# Patient Record
Sex: FEMALE | Race: WHITE | NOT HISPANIC OR LATINO | Employment: FULL TIME | URBAN - METROPOLITAN AREA
[De-identification: names, ages, dates, MRNs, and addresses within clinical notes are randomized per-mention and may not be internally consistent; named-entity substitution may affect disease eponyms.]

---

## 2022-07-23 ENCOUNTER — HOSPITAL ENCOUNTER (EMERGENCY)
Facility: HOSPITAL | Age: 37
Discharge: HOME | End: 2022-07-23
Attending: INTERNAL MEDICINE | Admitting: EMERGENCY MEDICINE
Payer: COMMERCIAL

## 2022-07-23 ENCOUNTER — APPOINTMENT (EMERGENCY)
Dept: VASCULAR ULTRASOUND | Facility: HOSPITAL | Age: 37
End: 2022-07-23
Payer: COMMERCIAL

## 2022-07-23 VITALS
SYSTOLIC BLOOD PRESSURE: 116 MMHG | BODY MASS INDEX: 26.43 KG/M2 | RESPIRATION RATE: 16 BRPM | DIASTOLIC BLOOD PRESSURE: 76 MMHG | WEIGHT: 140 LBS | OXYGEN SATURATION: 100 % | HEART RATE: 85 BPM | HEIGHT: 61 IN | TEMPERATURE: 98.5 F

## 2022-07-23 DIAGNOSIS — M79.604 BILATERAL LEG PAIN: Primary | ICD-10-CM

## 2022-07-23 DIAGNOSIS — S93.402A LEFT ANKLE SPRAIN: ICD-10-CM

## 2022-07-23 DIAGNOSIS — M79.605 BILATERAL LEG PAIN: Primary | ICD-10-CM

## 2022-07-23 PROCEDURE — 93970 EXTREMITY STUDY: CPT | Performed by: SURGERY

## 2022-07-23 PROCEDURE — 99284 EMERGENCY DEPT VISIT MOD MDM: CPT

## 2022-07-23 PROCEDURE — 93970 EXTREMITY STUDY: CPT

## 2022-07-23 PROCEDURE — 99284 EMERGENCY DEPT VISIT MOD MDM: CPT | Performed by: INTERNAL MEDICINE

## 2022-07-23 RX ADMIN — DICLOFENAC SODIUM TOPICAL GEL, 1%, 2 G: 10 GEL TOPICAL at 19:15

## 2022-07-23 NOTE — DISCHARGE INSTRUCTIONS
Ultrasound of your leg showed no DVT  Please follow-up with your primary care provider regarding her pain  Tylenol and ibuprofen as needed for the pain

## 2022-07-23 NOTE — ED PROVIDER NOTES
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  None       History reviewed  No pertinent past medical history  History reviewed  No pertinent surgical history  History reviewed  No pertinent family history  I have reviewed and agree with the history as documented  E-Cigarette/Vaping    E-Cigarette Use Never User      E-Cigarette/Vaping Substances     Social History     Tobacco Use    Smoking status: Current Every Day Smoker     Packs/day: 0 50     Types: Cigarettes    Smokeless tobacco: Never Used   Vaping Use    Vaping Use: Never used   Substance Use Topics    Alcohol use: Yes     Comment: rare    Drug use: Not Currently       Review of Systems   Constitutional: Negative for fatigue and fever  HENT: Negative for congestion, sinus pressure, sinus pain and sore throat  Respiratory: Negative for cough and shortness of breath  Cardiovascular: Negative for chest pain and palpitations  Gastrointestinal: Negative for abdominal pain, diarrhea, nausea and vomiting  Genitourinary: Negative for difficulty urinating, dysuria, flank pain and frequency  Musculoskeletal: Positive for myalgias  Negative for back pain, neck pain and neck stiffness  Skin: Negative for color change, pallor and rash  Neurological: Negative for dizziness, seizures, speech difficulty, light-headedness and headaches     Hematological: Negative for adenopathy  Does not bruise/bleed easily  Psychiatric/Behavioral: Negative for agitation and behavioral problems  Physical Exam  Physical Exam  Vitals and nursing note reviewed  Constitutional:       General: She is not in acute distress  Appearance: She is well-developed  She is not ill-appearing, toxic-appearing or diaphoretic  HENT:      Head: Normocephalic and atraumatic  Right Ear: Ear canal normal       Left Ear: Ear canal normal       Nose: Nose normal  No congestion or rhinorrhea  Mouth/Throat:      Mouth: Mucous membranes are moist       Pharynx: No oropharyngeal exudate or posterior oropharyngeal erythema  Eyes:      Extraocular Movements: Extraocular movements intact  Pupils: Pupils are equal, round, and reactive to light  Cardiovascular:      Rate and Rhythm: Normal rate and regular rhythm  Heart sounds: Normal heart sounds  No murmur heard  No friction rub  No gallop  Pulmonary:      Effort: Pulmonary effort is normal  No respiratory distress  Breath sounds: Normal breath sounds  No wheezing, rhonchi or rales  Chest:      Chest wall: No tenderness  Abdominal:      General: Bowel sounds are normal  There is no distension  Palpations: Abdomen is soft  There is no mass  Tenderness: There is no abdominal tenderness  There is no right CVA tenderness, left CVA tenderness, guarding or rebound  Hernia: No hernia is present  Musculoskeletal:         General: Tenderness present  No swelling, deformity or signs of injury  Normal range of motion  Cervical back: Normal range of motion and neck supple  Right lower leg: No edema  Left lower leg: No edema  Comments: Examination of the right and left calves has pain and tenderness , no swelling , no erythema,redmess  Examination of both lower extremities , sensation intact, neurovascular intact  Skin:     General: Skin is warm and dry        Capillary Refill: Capillary refill takes less than 2 seconds  Coloration: Skin is not jaundiced or pale  Findings: No bruising, erythema, lesion or rash  Neurological:      Mental Status: She is alert and oriented to person, place, and time  Psychiatric:         Behavior: Behavior normal          Vital Signs  ED Triage Vitals [07/23/22 1810]   Temperature Pulse Respirations Blood Pressure SpO2   98 5 °F (36 9 °C) 85 16 116/76 100 %      Temp src Heart Rate Source Patient Position - Orthostatic VS BP Location FiO2 (%)   -- -- -- -- --      Pain Score       --           Vitals:    07/23/22 1810   BP: 116/76   Pulse: 85         Visual Acuity      ED Medications  Medications   Diclofenac Sodium (VOLTAREN) 1 % topical gel 2 g (2 g Topical Given 7/23/22 1915)       Diagnostic Studies  Results Reviewed     None                 VAS lower limb venous duplex study, complete bilateral   Final Result by Valentino Slates, MD (07/23 2128)                 Procedures  Procedures         ED Course                               SBIRT 22yo+    Flowsheet Row Most Recent Value   SBIRT (23 yo +)    In order to provide better care to our patients, we are screening all of our patients for alcohol and drug use  Would it be okay to ask you these screening questions? No Filed at: 07/23/2022 1858                    MDM    Disposition  Final diagnoses:   Bilateral leg pain   Left ankle sprain     Time reflects when diagnosis was documented in both MDM as applicable and the Disposition within this note     Time User Action Codes Description Comment    7/23/2022  7:00 PM Tami Diaz [Q65 755,  M66 795] Bilateral leg pain     7/23/2022  7:14 PM Tami Diaz [N14 326O] Left ankle sprain       ED Disposition     ED Disposition   Discharge    Condition   Stable    Date/Time   Sat Jul 23, 2022  7:00 PM    [address] [address] discharge to home/self care                 Follow-up Information     Follow up With Specialties Details Why Contact Info Additional [id number]  Emergency Department Emergency Medicine  If symptoms worsen [address] [address]  [address] [facility] Emergency Department, [address], [address]    [address] Specialists [facility] Orthopedic Surgery Schedule an appointment as soon as possible for a visit  For re-evaluation as soon as possible [address] [address] [phone] [address] [facility] [id number] [place] [address], [address] [place] [address] [phone]          There are no discharge medications for this patient  No discharge procedures on file      PDMP Review     None          ED Provider  Electronically Signed by           Marty Kilgore MD  07/24/22 0412

## 2022-07-23 NOTE — ED CARE HANDOFF
Emergency Department Sign Out Note        Sign out and transfer of care from my colleague  See Separate Emergency Department note  The patient, Clarissa Crane, was evaluated by the previous provider for bilateral leg pain  Workup Completed:  Sent from urgent care for rule out DVT  ED Course / Workup Pending (followup):  Pending duplex lower extremities  Duplex is negative  On my exam patient has very minimal tenderness over the left ATFL  Otherwise no pain on palpation of the lower extremities  History is not consistent with cramps  No reason for electrolyte test     Discussed possible x-ray of the left ankle  No significant suspicion for fracture  Patient declines x-ray will patient follow-up with orthopedic surgery  Will dischare home  Follow with ortho  Nurse placed patient in [facility]                                      Procedures  MDM        Disposition  Final diagnoses:   Bilateral leg pain     Time reflects when diagnosis was documented in both MDM as applicable and the Disposition within this note     Time User Action Codes Description Comment    7/23/2022  7:00 PM Serena Gregory Add [C90 660,  M16 60] Bilateral leg pain       ED Disposition     ED Disposition   Discharge    Condition   Stable    Date/Time   Sat Jul 23, 2022  7:00 PM    [address] [facility] [address] discharge to home/self care                 Follow-up Information     Follow up With Specialties Details Why Contact Info Additional [id number]  Emergency Department Emergency Medicine  If symptoms worsen [address] [address]  711 [facility] Emergency Department, [address], [address] [facility] Specialists [facility] Orthopedic Surgery Schedule an appointment as soon as possible for a visit  For re-evaluation as soon as possible [address] [address]  [phone] [address] [phone]        Patient's Medications    No medications on file     No discharge procedures on file         ED Provider  Electronically Signed by     Payal Amador DO  07/23/22 [address] [place] [address],   07/23/22 8962

## 2022-07-23 NOTE — ED NOTES
Discharge instructions reviewed with pt  Pt verbalized understanding  And has no further questions at this time         Brandon Askew RN  07/23/22 1935
Lázaro texted vascular on call, aware of order     Rea Melendez, NASRIN  07/23/22 8427
Provider at bedside       Chanell Agee RN  07/23/22 0574
Report to NASRIN Cardona RN  07/23/22 8579
IV intact

## 2023-03-24 ENCOUNTER — OFFICE VISIT (OUTPATIENT)
Dept: FAMILY MEDICINE CLINIC | Facility: CLINIC | Age: 38
End: 2023-03-24

## 2023-03-24 VITALS
HEART RATE: 74 BPM | RESPIRATION RATE: 16 BRPM | WEIGHT: 147 LBS | DIASTOLIC BLOOD PRESSURE: 76 MMHG | BODY MASS INDEX: 27.75 KG/M2 | OXYGEN SATURATION: 98 % | SYSTOLIC BLOOD PRESSURE: 110 MMHG | TEMPERATURE: 98.9 F | HEIGHT: 61 IN

## 2023-03-24 DIAGNOSIS — K21.9 GASTROESOPHAGEAL REFLUX DISEASE WITHOUT ESOPHAGITIS: ICD-10-CM

## 2023-03-24 DIAGNOSIS — J45.20 MILD INTERMITTENT ASTHMA WITHOUT COMPLICATION: ICD-10-CM

## 2023-03-24 DIAGNOSIS — Z23 NEED FOR VACCINATION: ICD-10-CM

## 2023-03-24 DIAGNOSIS — R20.0 NUMBNESS AND TINGLING IN BOTH HANDS: ICD-10-CM

## 2023-03-24 DIAGNOSIS — R20.2 NUMBNESS AND TINGLING IN BOTH HANDS: ICD-10-CM

## 2023-03-24 DIAGNOSIS — F17.219 CIGARETTE NICOTINE DEPENDENCE WITH NICOTINE-INDUCED DISORDER: ICD-10-CM

## 2023-03-24 DIAGNOSIS — Z23 IMMUNIZATION DUE: ICD-10-CM

## 2023-03-24 DIAGNOSIS — Z11.59 NEED FOR HEPATITIS C SCREENING TEST: ICD-10-CM

## 2023-03-24 DIAGNOSIS — Z00.00 ANNUAL PHYSICAL EXAM: Primary | ICD-10-CM

## 2023-03-24 PROBLEM — F17.210 CIGARETTE NICOTINE DEPENDENCE: Status: ACTIVE | Noted: 2023-03-24

## 2023-03-24 PROBLEM — N94.6 DYSMENORRHEA: Status: ACTIVE | Noted: 2023-03-24

## 2023-03-24 RX ORDER — ALBUTEROL SULFATE 90 UG/1
2 AEROSOL, METERED RESPIRATORY (INHALATION) EVERY 4 HOURS
COMMUNITY

## 2023-03-24 RX ORDER — FAMOTIDINE 40 MG/1
40 TABLET, FILM COATED ORAL DAILY
Qty: 30 TABLET | Refills: 1 | Status: SHIPPED | OUTPATIENT
Start: 2023-03-24

## 2023-03-24 NOTE — PROGRESS NOTES
Name: Gail Fontenot      : 1985      MRN: [id number]  Encounter Provider: Cary Rene MD  Encounter Date: 3/24/2023   Encounter department: [address] [place] [address] MEDICINE    Assessment & Plan     1  Annual physical exam  Assessment & Plan:  Check routine labs      Orders:  -     CBC and differential; Future  -     Comprehensive metabolic panel; Future; Expected date: 2023  -     Lipid panel; Future; Expected date: 2023    2  Need for vaccination  -     TDAP VACCINE GREATER THAN OR EQUAL TO 6YO IM    3  Need for hepatitis C screening test  -     Hepatitis C Antibody ([facility]); Future    4  Immunization due    5  Numbness and tingling in both hands  Assessment & Plan:  History consistent with CTS for years  will check EMG    Orders:  -     EMG 2 Limb Upper Extremity; Future    6  Gastroesophageal reflux disease without esophagitis  Assessment & Plan:  Pt uses prilosec otc has had more discomfort recently upper abd will start  pepcid UGI US abd    Orders:  -     FL UPPER GI UGI; Future; Expected date: 2023  -     US abdomen complete; Future; Expected date: 2023  -     famotidine (PEPCID) 40 MG tablet; Take 1 tablet (40 mg total) by mouth daily    7  Mild intermittent asthma without complication  Assessment & Plan:  Pt with no formal diagnosis  Does  Smoke will check  PFTs and chest xray uses ventolin prn    Orders:  -     Complete PFT with post bronchodilator; Future  -     XR chest pa & lateral; Future; Expected date: 2023    8   Cigarette nicotine dependence with nicotine-induced disorder  Assessment & Plan:  Pt does not wish to stop at this time warnings on lung heart and vascular disease              Subjective      HPI new pt here for physical pt has had numbness in both hands fpr years comes and goes now feeliing it more frequently no repetitive  movements no numbness now had mild symptoms this am worse at night feels she has to shakeout hand  Has had stomach issues for a long time a couple of months on off; does get "acid reflux" uses prilosec which helps  Pain is a sensation of fulness or tightness  Does get dysmenorrhea"very badly" will ve seeing gyn 5/23  Pt thinks she may have asthma gets sob wheezing with colds or being over hearted or very cold  requiring inhaler  Pt smokes daily  1/2ppd for 15 yrs   Review of Systems   Constitutional: Negative for appetite change, chills, fatigue and fever  Respiratory: Negative for cough, chest tightness and shortness of breath  Cardiovascular: Negative for chest pain, palpitations and leg swelling  Gastrointestinal: Negative for abdominal pain, constipation, diarrhea, nausea and vomiting  Genitourinary: Positive for menstrual problem (severe menstrual cramps)  Negative for difficulty urinating and frequency  Musculoskeletal: Negative for arthralgias, back pain, gait problem and neck pain  Skin: Negative for rash  Neurological: Negative for dizziness, weakness, light-headedness, numbness (both hands ) and headaches  Hematological: Does not bruise/bleed easily  Psychiatric/Behavioral: Negative for dysphoric mood and sleep disturbance  The patient is not nervous/anxious  Current Outpatient Medications on File Prior to Visit   Medication Sig   • albuterol (PROVENTIL HFA,VENTOLIN HFA) 90 mcg/act inhaler Inhale 2 puffs every 4 (four) hours       Objective     /76 (BP Location: Left arm, Patient Position: Sitting, Cuff Size: Standard)   Pulse 74   Temp 98 9 °F (37 2 °C) (Tympanic)   Resp 16   Ht 5' 1" (1 549 m)   Wt 66 7 kg (147 lb)   SpO2 98%   BMI 27 78 kg/m²     Physical Exam  Vitals reviewed  Constitutional:       General: She is not in acute distress  Appearance: Normal appearance  She is well-developed  She is not ill-appearing  HENT:      Head: Normocephalic        Right Ear: Tympanic membrane, ear canal and external ear normal       Left Ear: Tympanic membrane, ear canal and external ear normal       Nose: Nose normal       Mouth/Throat:      Mouth: Mucous membranes are moist       Pharynx: No oropharyngeal exudate  Eyes:      General: Lids are normal  No scleral icterus  Extraocular Movements: Extraocular movements intact  Conjunctiva/sclera: Conjunctivae normal       Pupils: Pupils are equal, round, and reactive to light  Neck:      Thyroid: No thyromegaly  Vascular: No carotid bruit  Cardiovascular:      Rate and Rhythm: Normal rate and regular rhythm  Pulses: Normal pulses  Heart sounds: Normal heart sounds  No murmur heard  No friction rub  Pulmonary:      Effort: Pulmonary effort is normal       Breath sounds: Normal breath sounds  No wheezing, rhonchi or rales  Abdominal:      General: Bowel sounds are normal  There is no distension  Palpations: Abdomen is soft  There is no mass  Tenderness: There is no abdominal tenderness  There is no guarding  Hernia: No hernia is present  Musculoskeletal:         General: Normal range of motion  Cervical back: Normal range of motion and neck supple  Lymphadenopathy:      Cervical: No cervical adenopathy  Skin:     General: Skin is warm and dry  Findings: No rash  Comments: No abnormal appearing moles   Neurological:      General: No focal deficit present  Mental Status: She is alert and oriented to person, place, and time  Mental status is at baseline  Cranial Nerves: No cranial nerve deficit  Sensory: No sensory deficit  Motor: No weakness, tremor or abnormal muscle tone  Coordination: Coordination normal       Gait: Gait normal       Deep Tendon Reflexes: Reflexes normal       Comments: Negative phalens; positive Tinels bilaterally    Psychiatric:         Mood and Affect: Mood normal          Speech: Speech normal          Behavior: Behavior normal      BMI Counseling: Body mass index is 27 78 kg/m²   The BMI is above normal  Nutrition recommendations include 3-5 servings of fruits/vegetables daily, reducing fast food intake, consuming healthier snacks, decreasing soda and/or juice intake, moderation in carbohydrate intake and increasing intake of lean protein  Exercise recommendations include exercising 3-5 times per week and strength training exercises    Anjelica Hess MD

## 2023-04-01 ENCOUNTER — APPOINTMENT (OUTPATIENT)
Dept: LAB | Facility: CLINIC | Age: 38
End: 2023-04-01

## 2023-04-01 DIAGNOSIS — Z00.00 ANNUAL PHYSICAL EXAM: ICD-10-CM

## 2023-04-01 DIAGNOSIS — Z11.59 NEED FOR HEPATITIS C SCREENING TEST: ICD-10-CM

## 2023-04-01 LAB
ALBUMIN SERPL BCP-MCNC: 4 G/DL (ref 3.5–5)
ALP SERPL-CCNC: 66 U/L (ref 34–104)
ALT SERPL W P-5'-P-CCNC: 10 U/L (ref 7–52)
ANION GAP SERPL CALCULATED.3IONS-SCNC: 4 MMOL/L (ref 4–13)
AST SERPL W P-5'-P-CCNC: 11 U/L (ref 13–39)
BASOPHILS # BLD AUTO: 0.1 THOUSANDS/ÂΜL (ref 0–0.1)
BASOPHILS NFR BLD AUTO: 1 % (ref 0–1)
BILIRUB SERPL-MCNC: 0.67 MG/DL (ref 0.2–1)
BUN SERPL-MCNC: 12 MG/DL (ref 5–25)
CALCIUM SERPL-MCNC: 8.7 MG/DL (ref 8.4–10.2)
CHLORIDE SERPL-SCNC: 106 MMOL/L (ref 96–108)
CHOLEST SERPL-MCNC: 163 MG/DL
CO2 SERPL-SCNC: 29 MMOL/L (ref 21–32)
CREAT SERPL-MCNC: 0.77 MG/DL (ref 0.6–1.3)
EOSINOPHIL # BLD AUTO: 0.09 THOUSAND/ÂΜL (ref 0–0.61)
EOSINOPHIL NFR BLD AUTO: 1 % (ref 0–6)
ERYTHROCYTE [DISTWIDTH] IN BLOOD BY AUTOMATED COUNT: 13.6 % (ref 11.6–15.1)
GFR SERPL CREATININE-BSD FRML MDRD: 100 ML/MIN/1.73SQ M
GLUCOSE P FAST SERPL-MCNC: 97 MG/DL (ref 65–99)
HCT VFR BLD AUTO: 42.3 % (ref 34.8–46.1)
HCV AB SER QL: NORMAL
HDLC SERPL-MCNC: 42 MG/DL
HGB BLD-MCNC: 13.7 G/DL (ref 11.5–15.4)
IMM GRANULOCYTES # BLD AUTO: 0.04 THOUSAND/UL (ref 0–0.2)
IMM GRANULOCYTES NFR BLD AUTO: 1 % (ref 0–2)
LDLC SERPL CALC-MCNC: 107 MG/DL (ref 0–100)
LYMPHOCYTES # BLD AUTO: 1.96 THOUSANDS/ÂΜL (ref 0.6–4.47)
LYMPHOCYTES NFR BLD AUTO: 23 % (ref 14–44)
MCH RBC QN AUTO: 29.1 PG (ref 26.8–34.3)
MCHC RBC AUTO-ENTMCNC: 32.4 G/DL (ref 31.4–37.4)
MCV RBC AUTO: 90 FL (ref 82–98)
MONOCYTES # BLD AUTO: 0.44 THOUSAND/ÂΜL (ref 0.17–1.22)
MONOCYTES NFR BLD AUTO: 5 % (ref 4–12)
NEUTROPHILS # BLD AUTO: 5.79 THOUSANDS/ÂΜL (ref 1.85–7.62)
NEUTS SEG NFR BLD AUTO: 69 % (ref 43–75)
NONHDLC SERPL-MCNC: 121 MG/DL
NRBC BLD AUTO-RTO: 0 /100 WBCS
PLATELET # BLD AUTO: 296 THOUSANDS/UL (ref 149–390)
PMV BLD AUTO: 11.1 FL (ref 8.9–12.7)
POTASSIUM SERPL-SCNC: 4 MMOL/L (ref 3.5–5.3)
PROT SERPL-MCNC: 6.5 G/DL (ref 6.4–8.4)
RBC # BLD AUTO: 4.7 MILLION/UL (ref 3.81–5.12)
SODIUM SERPL-SCNC: 139 MMOL/L (ref 135–147)
TRIGL SERPL-MCNC: 69 MG/DL
WBC # BLD AUTO: 8.42 THOUSAND/UL (ref 4.31–10.16)

## 2023-04-06 ENCOUNTER — HOSPITAL ENCOUNTER (OUTPATIENT)
Dept: PULMONOLOGY | Facility: HOSPITAL | Age: 38
End: 2023-04-06

## 2023-04-06 DIAGNOSIS — J45.20 MILD INTERMITTENT ASTHMA WITHOUT COMPLICATION: ICD-10-CM

## 2023-04-06 RX ORDER — ALBUTEROL SULFATE 2.5 MG/3ML
2.5 SOLUTION RESPIRATORY (INHALATION) ONCE
Status: COMPLETED | OUTPATIENT
Start: 2023-04-06 | End: 2023-04-06

## 2023-04-06 RX ADMIN — ALBUTEROL SULFATE 2.5 MG: 2.5 SOLUTION RESPIRATORY (INHALATION) at 16:59

## 2023-06-14 ENCOUNTER — ANNUAL EXAM (OUTPATIENT)
Dept: OBGYN CLINIC | Facility: CLINIC | Age: 38
End: 2023-06-14
Payer: COMMERCIAL

## 2023-06-14 VITALS
SYSTOLIC BLOOD PRESSURE: 112 MMHG | DIASTOLIC BLOOD PRESSURE: 70 MMHG | BODY MASS INDEX: 27.41 KG/M2 | BODY MASS INDEX: 27.41 KG/M2 | WEIGHT: 145.2 LBS | HEIGHT: 61 IN | DIASTOLIC BLOOD PRESSURE: 70 MMHG | WEIGHT: 145.2 LBS | SYSTOLIC BLOOD PRESSURE: 112 MMHG | HEIGHT: 61 IN

## 2023-06-14 DIAGNOSIS — N92.0 MENORRHAGIA WITH REGULAR CYCLE: ICD-10-CM

## 2023-06-14 DIAGNOSIS — Z12.4 ENCOUNTER FOR PAPANICOLAOU SMEAR FOR CERVICAL CANCER SCREENING: ICD-10-CM

## 2023-06-14 DIAGNOSIS — Z01.419 WOMEN'S ANNUAL ROUTINE GYNECOLOGICAL EXAMINATION: Primary | ICD-10-CM

## 2023-06-14 PROCEDURE — G0476 HPV COMBO ASSAY CA SCREEN: HCPCS | Performed by: OBSTETRICS & GYNECOLOGY

## 2023-06-14 PROCEDURE — 99385 PREV VISIT NEW AGE 18-39: CPT | Performed by: OBSTETRICS & GYNECOLOGY

## 2023-06-14 PROCEDURE — G0145 SCR C/V CYTO,THINLAYER,RESCR: HCPCS | Performed by: OBSTETRICS & GYNECOLOGY

## 2023-06-14 RX ORDER — ACETAMINOPHEN AND CODEINE PHOSPHATE 120; 12 MG/5ML; MG/5ML
1 SOLUTION ORAL DAILY
Qty: 84 TABLET | Refills: 1 | Status: SHIPPED | OUTPATIENT
Start: 2023-06-14

## 2023-06-14 NOTE — PROGRESS NOTES
"Subjective      Tomi Gimenez is a 30 y o  female who presents for annual well woman exam  Periods are regular every 28-30 days, lasting 7 days  No intermenstrual bleeding, spotting, or discharge  Heavy and painful   4 days very heavy change pad q 1-2h  At night very heavy       Current contraception: condoms  History of abnormal Pap smear: no  Family history of uterine or ovarian cancer: no  Family history of breast cancer: no    Menstrual History:  OB History        0    Para        Term                AB   0    Living   0       SAB   0    IAB   0    Ectopic   0    Multiple        Live Births                      Patient's last menstrual period was 2023  The following portions of the patient's history were reviewed and updated as appropriate: allergies, current medications, past family history, past medical history, past social history, past surgical history and problem list     Review of Systems  Review of Systems   Constitutional: Negative for activity change, appetite change, chills, fatigue and fever  Respiratory: Negative for apnea, cough, chest tightness and shortness of breath  Cardiovascular: Negative for chest pain, palpitations and leg swelling  Gastrointestinal: Negative for abdominal pain, constipation, diarrhea, nausea and vomiting  Genitourinary: Negative for difficulty urinating, dysuria, flank pain, frequency, hematuria and urgency  Neurological: Negative for dizziness, seizures, syncope, light-headedness, numbness and headaches  Psychiatric/Behavioral: Negative for agitation and confusion            Objective      /70 (BP Location: Left arm, Patient Position: Sitting, Cuff Size: Large)   Ht 5' 1\" (1 549 m)   Wt 65 9 kg (145 lb 3 2 oz)   LMP 2023   Breastfeeding No   BMI 27 44 kg/m²     Physical Exam  OBGyn Exam     General:   alert and oriented, in no acute distress, alert, appears stated age and cooperative   Heart: regular rate and " rhythm, S1, S2 normal, no murmur, click, rub or gallop   Lungs: clear to auscultation bilaterally   Abdomen: soft, non-tender, without masses or organomegaly   Vulva: normal   Vagina: normal mucosa, normal discharge   Cervix: no cervical motion tenderness and no lesions   Uterus: normal size   Adnexa:  Breast Exam:  normal adnexa  breasts appear normal, no suspicious masses, no skin or nipple changes or axillary nodes  Assessment      @well woman@   53-year-old female  Annual exam  Menorrhagia  Dysmenorrhea/PMS  Smoker  Plan   Pap/HPV  Diet/exercise  Calcium/vitamin D  Pelvic ultrasound    Management option for menorrhagia/dysmenorrhea/PMS symptoms reviewed and discussed with patient patient is not candidate for combined hormonal contraception secondary to her age and smoking option given for progesterone only pills, progesterone method such as Depo-Provera/, Mirena IUD to help with menorrhagia, patient desired to try progesterone only pills risk-benefit side effect reviewed and discussed with patient to monitor her symptoms for the next 3 months have her ultrasound and return to office in 3 months for follow-up plan explained and discussed with patient all patient questions answered and patient     All questions answered  There are no Patient Instructions on file for this visit

## 2023-06-19 LAB
HPV HR 12 DNA CVX QL NAA+PROBE: NEGATIVE
HPV16 DNA CVX QL NAA+PROBE: NEGATIVE
HPV18 DNA CVX QL NAA+PROBE: NEGATIVE

## 2023-06-23 ENCOUNTER — HOSPITAL ENCOUNTER (OUTPATIENT)
Dept: ULTRASOUND IMAGING | Facility: HOSPITAL | Age: 38
Discharge: HOME | End: 2023-06-23
Attending: OBSTETRICS & GYNECOLOGY
Payer: COMMERCIAL

## 2023-06-23 DIAGNOSIS — N92.0 MENORRHAGIA WITH REGULAR CYCLE: ICD-10-CM

## 2023-06-23 PROCEDURE — 76830 TRANSVAGINAL US NON-OB: CPT

## 2023-06-23 PROCEDURE — 76856 US EXAM PELVIC COMPLETE: CPT

## 2023-06-26 LAB
LAB AP GYN PRIMARY INTERPRETATION: NORMAL
Lab: NORMAL
PATH INTERP SPEC-IMP: NORMAL

## 2023-07-03 DIAGNOSIS — A59.9 TRICHIMONIASIS: Primary | ICD-10-CM

## 2023-07-03 RX ORDER — METRONIDAZOLE 500 MG/1
500 TABLET ORAL EVERY 12 HOURS SCHEDULED
Qty: 14 TABLET | Refills: 0 | Status: SHIPPED | OUTPATIENT
Start: 2023-07-03 | End: 2023-07-10

## 2023-07-11 ENCOUNTER — TELEPHONE (OUTPATIENT)
Dept: OBGYN CLINIC | Facility: CLINIC | Age: 38
End: 2023-07-11

## 2023-09-05 ENCOUNTER — OFFICE VISIT (OUTPATIENT)
Dept: OBGYN CLINIC | Facility: CLINIC | Age: 38
End: 2023-09-05
Payer: COMMERCIAL

## 2023-09-05 VITALS
DIASTOLIC BLOOD PRESSURE: 64 MMHG | WEIGHT: 149 LBS | HEIGHT: 61 IN | BODY MASS INDEX: 28.13 KG/M2 | SYSTOLIC BLOOD PRESSURE: 110 MMHG

## 2023-09-05 DIAGNOSIS — N92.1 MENORRHAGIA WITH IRREGULAR CYCLE: Primary | ICD-10-CM

## 2023-09-05 PROCEDURE — 99213 OFFICE O/P EST LOW 20 MIN: CPT | Performed by: OBSTETRICS & GYNECOLOGY

## 2023-09-05 RX ORDER — MEDROXYPROGESTERONE ACETATE 150 MG/ML
150 INJECTION, SUSPENSION INTRAMUSCULAR
Qty: 1 ML | Refills: 2 | Status: SHIPPED | OUTPATIENT
Start: 2023-09-05

## 2023-09-05 NOTE — PROGRESS NOTES
Assessment/Plan:     Diagnoses and all orders for this visit:    Menorrhagia with irregular cycle  -     medroxyPROGESTERone (DEPO-PROVERA) 150 mg/mL injection; Inject 1 mL (150 mg total) into a muscle every 3 (three) months          72-year-old female  Annual exam  Menorrhagia  Dysmenorrhea/PMS  Smoker  Plan   Patient try progesterone only pills with improvement of her PMS and mild improvement for her dyspareunia/menorrhagia desire to try Depo-Provera risk-benefit side effect reviewed and discussed with patient importance of taking her daily requirement of calcium and vitamin D discussed all patient question answered and patient was satisfied   to return to office for Depo-Provera      Subjective:      Patient ID: Farrukh Ozuna is a 41 y.o. female.     HPI  72-year-old female presents to the office today follow-up on OCP  Patient was taking OCP secondary to PMS/dysmenorrhea/menorrhagia  Patient said for the first 2 months her PMS dysmenorrhea and the rash is significantly improved  For the third month patient was having improvement in PMS but still have dysmenorrhea and menorrhagia  Different management option for her symptoms reviewed and discussed with patient option given to continue current management versus try Depo-Provera patient is not a candidate for combined OCPs secondary to her smoking and age  Patient is willing to try Depo-Provera  Risk benefits side effect reviewed and discussed with patient importance of taking her daily requirement of calcium and vitamin D discussed with patient to return to office in 1 to 2 weeks for Depo-Provera injection  Bleeding pattern with Depo-Provera discussed with patient as well as weight gain as a side effect reviewed and discussed with patient  All patient questions answered in details and patient was satisfied  The following portions of the patient's history were reviewed and updated as appropriate: allergies, current medications, past family history, past medical history, past social history, past surgical history and problem list.    Review of Systems      Objective:      /64 (BP Location: Left arm, Patient Position: Sitting, Cuff Size: Adult)   Ht 5' 1" (1.549 m)   Wt 67.6 kg (149 lb)   LMP 08/31/2023   BMI 28.15 kg/m²          Physical Exam  Constitutional:       Appearance: Normal appearance. Neurological:      General: No focal deficit present. Mental Status: She is alert and oriented to person, place, and time.    Psychiatric:         Mood and Affect: Mood normal.         Behavior: Behavior normal.

## 2023-09-25 ENCOUNTER — OFFICE VISIT (OUTPATIENT)
Dept: FAMILY MEDICINE CLINIC | Facility: CLINIC | Age: 38
End: 2023-09-25
Payer: COMMERCIAL

## 2023-09-25 VITALS
TEMPERATURE: 99.2 F | WEIGHT: 150 LBS | DIASTOLIC BLOOD PRESSURE: 72 MMHG | RESPIRATION RATE: 16 BRPM | OXYGEN SATURATION: 98 % | HEIGHT: 61 IN | HEART RATE: 97 BPM | BODY MASS INDEX: 28.32 KG/M2 | SYSTOLIC BLOOD PRESSURE: 110 MMHG

## 2023-09-25 DIAGNOSIS — J06.9 ACUTE URI: Primary | ICD-10-CM

## 2023-09-25 PROCEDURE — 99213 OFFICE O/P EST LOW 20 MIN: CPT | Performed by: FAMILY MEDICINE

## 2023-09-25 RX ORDER — AMOXICILLIN 875 MG/1
875 TABLET, COATED ORAL 2 TIMES DAILY
Qty: 20 TABLET | Refills: 0 | Status: SHIPPED | OUTPATIENT
Start: 2023-09-25 | End: 2023-10-05

## 2023-09-25 NOTE — PROGRESS NOTES
Name: Kenny Ball      : 1985      MRN: [id number]  Encounter Provider: Nida Atkinson MD  Encounter Date: 2023   Encounter department: [facility] [address] MEDICINE    Assessment & Plan     1. Acute URI  Assessment & Plan:  Probable sinusitis will trewat amoxicillin and otc decongestant     Orders:  -     amoxicillin (AMOXIL) 875 mg tablet; Take 1 tablet (875 mg total) by mouth 2 (two) times a day for 10 days           Subjective      HPI pt with 4 weeks of illness head congestion  burning in nose body aches  Mild sore throat  Occasional cough  No wheezing occasional tightness in breathing  Multiple negative tests at home for covid  Review of Systems   Constitutional: Negative for chills and fever. HENT: Positive for sinus pressure and sore throat. Negative for congestion, ear pain, mouth sores, rhinorrhea, sinus pain and trouble swallowing. Eyes: Negative for discharge. Respiratory: Negative for cough, chest tightness and shortness of breath. Cardiovascular: Negative for chest pain. Musculoskeletal: Negative for arthralgias and myalgias. Neurological: Negative for headaches. Current Outpatient Medications on File Prior to Visit   Medication Sig   • albuterol (PROVENTIL HFA,VENTOLIN HFA) 90 mcg/act inhaler Inhale 2 puffs every 4 (four) hours   • medroxyPROGESTERone (DEPO-PROVERA) 150 mg/mL injection Inject 1 mL (150 mg total) into a muscle every 3 (three) months   • famotidine (PEPCID) 40 MG tablet TAKE 1 TABLET BY MOUTH EVERY DAY       Objective     /72 (BP Location: Left arm, Patient Position: Sitting, Cuff Size: Standard)   Pulse 97   Temp 99.2 °F (37.3 °C) (Tympanic)   Resp 16   Ht 5' 1" (1.549 m)   Wt 68 kg (150 lb)   LMP 2023   SpO2 98%   BMI 28.34 kg/m²     Physical Exam  Constitutional:       General: She is not in acute distress. Appearance: Normal appearance. She is well-developed. She is not ill-appearing.    HENT:      Right Ear: Tympanic membrane and ear canal normal.      Left Ear: Tympanic membrane and ear canal normal.      Nose: Nose normal.      Right Sinus: No maxillary sinus tenderness or frontal sinus tenderness. Left Sinus: No maxillary sinus tenderness or frontal sinus tenderness. Mouth/Throat:      Mouth: Mucous membranes are moist.      Pharynx: No oropharyngeal exudate or posterior oropharyngeal erythema. Tonsils: No tonsillar exudate. Eyes:      General:         Right eye: No discharge. Left eye: No discharge. Conjunctiva/sclera: Conjunctivae normal.      Pupils: Pupils are equal, round, and reactive to light. Cardiovascular:      Rate and Rhythm: Normal rate. Heart sounds: Normal heart sounds. Pulmonary:      Effort: Pulmonary effort is normal.      Breath sounds: Normal breath sounds. Musculoskeletal:      Cervical back: Normal range of motion. Lymphadenopathy:      Cervical: No cervical adenopathy. Neurological:      General: No focal deficit present. Mental Status: She is alert. Mental status is at baseline.    Psychiatric:         Mood and Affect: Mood normal.       Zay Delgadillo MD

## 2023-09-29 ENCOUNTER — CLINICAL SUPPORT (OUTPATIENT)
Dept: OBGYN CLINIC | Facility: CLINIC | Age: 38
End: 2023-09-29
Payer: COMMERCIAL

## 2023-09-29 VITALS
WEIGHT: 150 LBS | BODY MASS INDEX: 28.32 KG/M2 | SYSTOLIC BLOOD PRESSURE: 112 MMHG | DIASTOLIC BLOOD PRESSURE: 74 MMHG | HEIGHT: 61 IN

## 2023-09-29 DIAGNOSIS — N92.6 IRREGULAR MENSES: ICD-10-CM

## 2023-09-29 DIAGNOSIS — Z30.013 ENCOUNTER FOR INITIAL PRESCRIPTION OF INJECTABLE CONTRACEPTIVE: Primary | ICD-10-CM

## 2023-09-29 PROCEDURE — 96372 THER/PROPH/DIAG INJ SC/IM: CPT

## 2023-09-29 RX ORDER — MEDROXYPROGESTERONE ACETATE 150 MG/ML
150 INJECTION, SUSPENSION INTRAMUSCULAR ONCE
Status: COMPLETED | OUTPATIENT
Start: 2023-09-29 | End: 2023-09-29

## 2023-09-29 RX ADMIN — MEDROXYPROGESTERONE ACETATE 150 MG: 150 INJECTION, SUSPENSION INTRAMUSCULAR at 11:21

## 2023-09-29 NOTE — PROGRESS NOTES
Patient could not leave us a urine sample for a urine UPT,  She tried for 45 minutes spoke with Franci Nguyen, since patient is currently on her menses, and has not had sexual intercourse for 2 years, advised we can give first injection, injection was given , right deltoid, tolerated well

## 2023-11-13 ENCOUNTER — TELEPHONE (OUTPATIENT)
Age: 38
End: 2023-11-13

## 2023-11-13 DIAGNOSIS — R20.0 NUMBNESS AND TINGLING IN BOTH HANDS: Primary | ICD-10-CM

## 2023-11-13 DIAGNOSIS — R20.2 NUMBNESS AND TINGLING IN BOTH HANDS: Primary | ICD-10-CM

## 2023-11-13 NOTE — TELEPHONE ENCOUNTER
I donnt know how long referral last
Patient aware- they told her it will  by the time they have availability so order placed for new order
Pt has to reschedule for this appt EMG 2 LIMB UPPER EXTREMITY was not able to get in until late march. She is asking if the referral can extended or new referral can be issued.
Name of Chaperone/MARVIN Louis

## 2023-11-24 PROBLEM — J06.9 ACUTE URI: Status: RESOLVED | Noted: 2023-09-25 | Resolved: 2023-11-24

## 2023-12-13 ENCOUNTER — CLINICAL SUPPORT (OUTPATIENT)
Dept: OBGYN CLINIC | Facility: CLINIC | Age: 38
End: 2023-12-13
Payer: COMMERCIAL

## 2023-12-13 VITALS
BODY MASS INDEX: 28.96 KG/M2 | SYSTOLIC BLOOD PRESSURE: 110 MMHG | HEIGHT: 61 IN | DIASTOLIC BLOOD PRESSURE: 62 MMHG | WEIGHT: 153.4 LBS

## 2023-12-13 DIAGNOSIS — Z30.42 ENCOUNTER FOR SURVEILLANCE OF INJECTABLE CONTRACEPTIVE: ICD-10-CM

## 2023-12-13 DIAGNOSIS — Z30.013 ENCOUNTER FOR INITIAL PRESCRIPTION OF INJECTABLE CONTRACEPTIVE: Primary | ICD-10-CM

## 2023-12-13 PROCEDURE — 96372 THER/PROPH/DIAG INJ SC/IM: CPT

## 2023-12-13 RX ORDER — MEDROXYPROGESTERONE ACETATE 150 MG/ML
150 INJECTION, SUSPENSION INTRAMUSCULAR ONCE
Status: COMPLETED | OUTPATIENT
Start: 2023-12-13 | End: 2023-12-13

## 2023-12-13 RX ADMIN — MEDROXYPROGESTERONE ACETATE 150 MG: 150 INJECTION, SUSPENSION INTRAMUSCULAR at 15:03

## 2024-02-20 DIAGNOSIS — K21.9 GASTROESOPHAGEAL REFLUX DISEASE WITHOUT ESOPHAGITIS: ICD-10-CM

## 2024-02-20 RX ORDER — FAMOTIDINE 40 MG/1
TABLET, FILM COATED ORAL
Qty: 90 TABLET | Refills: 1 | Status: SHIPPED | OUTPATIENT
Start: 2024-02-20

## 2024-03-01 ENCOUNTER — TELEPHONE (OUTPATIENT)
Age: 39
End: 2024-03-01

## 2024-03-01 ENCOUNTER — CLINICAL SUPPORT (OUTPATIENT)
Dept: OBGYN CLINIC | Facility: CLINIC | Age: 39
End: 2024-03-01
Payer: COMMERCIAL

## 2024-03-01 VITALS
SYSTOLIC BLOOD PRESSURE: 132 MMHG | HEIGHT: 61 IN | DIASTOLIC BLOOD PRESSURE: 78 MMHG | WEIGHT: 163.4 LBS | BODY MASS INDEX: 30.85 KG/M2

## 2024-03-01 DIAGNOSIS — Z30.42 ENCOUNTER FOR SURVEILLANCE OF INJECTABLE CONTRACEPTIVE: Primary | ICD-10-CM

## 2024-03-01 PROCEDURE — 96372 THER/PROPH/DIAG INJ SC/IM: CPT

## 2024-03-01 RX ORDER — MEDROXYPROGESTERONE ACETATE 150 MG/ML
150 INJECTION, SUSPENSION INTRAMUSCULAR ONCE
Status: COMPLETED | OUTPATIENT
Start: 2024-03-01 | End: 2024-03-01

## 2024-03-01 RX ADMIN — MEDROXYPROGESTERONE ACETATE 150 MG: 150 INJECTION, SUSPENSION INTRAMUSCULAR at 13:30

## 2024-03-01 NOTE — TELEPHONE ENCOUNTER
Patient called and said she received a call from the office to see if she could switch her depo to Monday and she said she can only come in today and would not be able to do Monday. Thank  you

## 2024-03-14 ENCOUNTER — OFFICE VISIT (OUTPATIENT)
Dept: OBGYN CLINIC | Facility: CLINIC | Age: 39
End: 2024-03-14
Payer: COMMERCIAL

## 2024-03-14 VITALS
WEIGHT: 160.6 LBS | SYSTOLIC BLOOD PRESSURE: 118 MMHG | DIASTOLIC BLOOD PRESSURE: 70 MMHG | BODY MASS INDEX: 30.32 KG/M2 | HEIGHT: 61 IN

## 2024-03-14 DIAGNOSIS — R39.9 URINARY TRACT INFECTION SYMPTOMS: Primary | ICD-10-CM

## 2024-03-14 DIAGNOSIS — R10.2 PELVIC PAIN: Primary | ICD-10-CM

## 2024-03-14 DIAGNOSIS — Z11.3 SCREEN FOR STD (SEXUALLY TRANSMITTED DISEASE): ICD-10-CM

## 2024-03-14 DIAGNOSIS — N89.8 VAGINAL ODOR: ICD-10-CM

## 2024-03-14 PROCEDURE — 87480 CANDIDA DNA DIR PROBE: CPT | Performed by: OBSTETRICS & GYNECOLOGY

## 2024-03-14 PROCEDURE — 87591 N.GONORRHOEAE DNA AMP PROB: CPT | Performed by: OBSTETRICS & GYNECOLOGY

## 2024-03-14 PROCEDURE — 99214 OFFICE O/P EST MOD 30 MIN: CPT | Performed by: OBSTETRICS & GYNECOLOGY

## 2024-03-14 PROCEDURE — 87491 CHLMYD TRACH DNA AMP PROBE: CPT | Performed by: OBSTETRICS & GYNECOLOGY

## 2024-03-14 PROCEDURE — 87660 TRICHOMONAS VAGIN DIR PROBE: CPT | Performed by: OBSTETRICS & GYNECOLOGY

## 2024-03-14 PROCEDURE — 87510 GARDNER VAG DNA DIR PROBE: CPT | Performed by: OBSTETRICS & GYNECOLOGY

## 2024-03-14 RX ORDER — DOXYCYCLINE 100 MG/1
100 TABLET ORAL 2 TIMES DAILY
Qty: 14 TABLET | Refills: 0 | Status: SHIPPED | OUTPATIENT
Start: 2024-03-14 | End: 2024-03-21

## 2024-03-14 RX ORDER — METRONIDAZOLE 7.5 MG/G
1 GEL VAGINAL DAILY
Qty: 5 G | Refills: 0 | Status: SHIPPED | OUTPATIENT
Start: 2024-03-14 | End: 2024-03-19

## 2024-03-14 NOTE — PROGRESS NOTES
"Assessment/Plan:     There are no diagnoses linked to this encounter.      37-year-old female  Pelvic pain   Menorrhagia  Dysmenorrhea/PMS  Smoker  Vaginal odor  Contraception Depo-Provera  Plan   GC/CT/affirm  MetroGel secondary to vaginal odor  Doxycycline secondary to uterine tenderness  Pelvic ultrasound  Urine culture  If all negative and still complaining of same symptoms recommend GI evaluation       Subjective:      Patient ID: Elis Patterson is a 38 y.o. female.  38-year-old female present to the office today complaining of pelvic pain  Pelvic Pain  The patient's primary symptoms include pelvic pain. The current episode started 1 to 4 weeks ago. The problem occurs constantly. The problem has been unchanged. The pain is mild. The problem affects both sides. Associated symptoms include abdominal pain, back pain, constipation, frequency, nausea, urgency and vomiting. Pertinent negatives include no diarrhea or dysuria. Nothing aggravates the symptoms. She has tried nothing for the symptoms. She is not sexually active.       The following portions of the patient's history were reviewed and updated as appropriate: allergies, current medications, past family history, past medical history, past social history, past surgical history and problem list.    Review of Systems   Gastrointestinal:  Positive for abdominal pain, constipation, nausea and vomiting. Negative for diarrhea.   Genitourinary:  Positive for frequency, pelvic pain and urgency. Negative for dysuria.   Musculoskeletal:  Positive for back pain.         Objective:      /70 (BP Location: Left arm, Patient Position: Sitting, Cuff Size: Adult)   Ht 5' 1\" (1.549 m)   Wt 72.8 kg (160 lb 9.6 oz)   BMI 30.35 kg/m²          Physical Exam  Constitutional:       Appearance: She is well-developed.   Abdominal:      General: There is no distension.      Palpations: Abdomen is soft.      Tenderness: There is no abdominal tenderness.   Genitourinary:     " Labia:         Right: No rash, tenderness or lesion.         Left: No rash, tenderness or lesion.       Vagina: No signs of injury. No vaginal discharge, erythema or tenderness.      Cervix: No cervical motion tenderness, discharge or friability.      Uterus: Tender.       Adnexa:         Right: No mass, tenderness or fullness.          Left: No mass, tenderness or fullness.        Comments: Vaginal odor noted on exam  Neurological:      Mental Status: She is alert and oriented to person, place, and time.   Psychiatric:         Behavior: Behavior normal.

## 2024-03-15 ENCOUNTER — APPOINTMENT (OUTPATIENT)
Dept: LAB | Facility: CLINIC | Age: 39
End: 2024-03-15
Payer: COMMERCIAL

## 2024-03-15 LAB
C TRACH DNA SPEC QL NAA+PROBE: NEGATIVE
CANDIDA RRNA VAG QL PROBE: NOT DETECTED
G VAGINALIS RRNA GENITAL QL PROBE: DETECTED
N GONORRHOEA DNA SPEC QL NAA+PROBE: NEGATIVE
T VAGINALIS RRNA GENITAL QL PROBE: NOT DETECTED

## 2024-03-15 PROCEDURE — 87086 URINE CULTURE/COLONY COUNT: CPT

## 2024-03-16 LAB — BACTERIA UR CULT: NORMAL

## 2024-03-18 ENCOUNTER — TELEPHONE (OUTPATIENT)
Age: 39
End: 2024-03-18

## 2024-03-18 NOTE — TELEPHONE ENCOUNTER
Patient called and said she received a call on Friday about her results. She said the one results posted on 3/14 and Dr Everett prescribed her medicine . Another result posted on 3/15 and I am not sure if she received that one.  Please call patient back at [phone]  . Thank you

## 2024-03-18 NOTE — TELEPHONE ENCOUNTER
Spoke with patient. Explained reason for doxycycline and metronidazole. Patient states she had received the medication and started them.  Requests further instructions  from Dr. Everett regarding urine culture

## 2024-03-18 NOTE — TELEPHONE ENCOUNTER
Number on file not in service, made 2 attempts to call. I sent detailed [employer] message to patient with recommendations

## 2024-03-18 NOTE — TELEPHONE ENCOUNTER
Urine culture negative, GC CT negative  Treated with doxycycline for uterine tenderness noted at the time of the exam as well as MetroGel given secondary to positive bacterial vaginosis no need for any more antibiotics to continue current antibiotics

## 2024-03-19 ENCOUNTER — HOSPITAL ENCOUNTER (OUTPATIENT)
Dept: ULTRASOUND IMAGING | Facility: HOSPITAL | Age: 39
Discharge: HOME | End: 2024-03-19
Attending: OBSTETRICS & GYNECOLOGY
Payer: COMMERCIAL

## 2024-03-19 DIAGNOSIS — R10.2 PELVIC PAIN: ICD-10-CM

## 2024-03-19 PROCEDURE — 76830 TRANSVAGINAL US NON-OB: CPT

## 2024-03-19 PROCEDURE — 76856 US EXAM PELVIC COMPLETE: CPT

## 2024-03-22 ENCOUNTER — HOSPITAL ENCOUNTER (OUTPATIENT)
Dept: NEUROLOGY | Facility: HOSPITAL | Age: 39
End: 2024-03-22
Payer: COMMERCIAL

## 2024-03-22 DIAGNOSIS — G56.00 MILD CARPAL TUNNEL SYNDROME, UNSPECIFIED LATERALITY: Primary | ICD-10-CM

## 2024-03-22 DIAGNOSIS — R20.2 NUMBNESS AND TINGLING IN BOTH HANDS: ICD-10-CM

## 2024-03-22 DIAGNOSIS — R20.0 NUMBNESS AND TINGLING IN BOTH HANDS: ICD-10-CM

## 2024-03-22 PROBLEM — G56.03 BILATERAL CARPAL TUNNEL SYNDROME: Status: ACTIVE | Noted: 2024-03-22

## 2024-03-22 PROCEDURE — 95912 NRV CNDJ TEST 11-12 STUDIES: CPT | Performed by: PSYCHIATRY & NEUROLOGY

## 2024-03-22 PROCEDURE — 95886 MUSC TEST DONE W/N TEST COMP: CPT | Performed by: PSYCHIATRY & NEUROLOGY

## 2024-03-23 DIAGNOSIS — K21.9 GASTROESOPHAGEAL REFLUX DISEASE WITHOUT ESOPHAGITIS: ICD-10-CM

## 2024-03-23 RX ORDER — FAMOTIDINE 40 MG/1
TABLET, FILM COATED ORAL
Qty: 90 TABLET | Refills: 2 | Status: SHIPPED | OUTPATIENT
Start: 2024-03-23

## 2024-04-01 ENCOUNTER — TELEPHONE (OUTPATIENT)
Age: 39
End: 2024-04-01

## 2024-04-01 NOTE — TELEPHONE ENCOUNTER
Spoke with pt advised that she can try Advil or aleve to alleviate the pain. Pt states she tried and it did not help. I rescheduled pt appointment to come in sooner.

## 2024-04-01 NOTE — TELEPHONE ENCOUNTER
Patient has a surgical consult with Dr. Everett on 4/12. She states she is still having a lot pain in abdomen, back, sides with cramping.

## 2024-04-03 ENCOUNTER — OFFICE VISIT (OUTPATIENT)
Dept: OBGYN CLINIC | Facility: CLINIC | Age: 39
End: 2024-04-03
Payer: COMMERCIAL

## 2024-04-03 VITALS
HEIGHT: 61 IN | WEIGHT: 162.4 LBS | DIASTOLIC BLOOD PRESSURE: 62 MMHG | BODY MASS INDEX: 30.66 KG/M2 | SYSTOLIC BLOOD PRESSURE: 114 MMHG

## 2024-04-03 DIAGNOSIS — R10.2 PELVIC PAIN: Primary | ICD-10-CM

## 2024-04-03 DIAGNOSIS — R10.84 GENERALIZED ABDOMINAL PAIN: ICD-10-CM

## 2024-04-03 DIAGNOSIS — N80.03 ADENOMYOSIS: ICD-10-CM

## 2024-04-03 DIAGNOSIS — D25.9 UTERINE LEIOMYOMA, UNSPECIFIED LOCATION: ICD-10-CM

## 2024-04-03 PROCEDURE — 99213 OFFICE O/P EST LOW 20 MIN: CPT | Performed by: OBSTETRICS & GYNECOLOGY

## 2024-04-03 NOTE — PROGRESS NOTES
Assessment/Plan:     Diagnoses and all orders for this visit:    Adenomyosis    Generalized abdominal pain  -     Ambulatory Referral to Gastroenterology; Future    Pelvic pain    Uterine leiomyoma, unspecified location          37-year-old female  Pelvic pain affecting quality of life  Menorrhagia  Dysmenorrhea/PMS  Smoker  Adenomyosis/fibroid uterus  Plan   Refer to GI to check for any GI etiology for abdominal pain  Management option for pelvic pain/adenomyosis/dysmenorrhea/menorrhagia reviewed and discussed with patient patient did not respond to Depo-Provera patient desired to proceed with definitive management as her pain is affecting her quality of life and desire to proceed with hysterectomy we will proceed with robotic with a laparoscopic hysterectomy bilateral salpingectomy procedure explained and discussed with patient all patient questions answered and patient was satisfied    Subjective:      Patient ID: Elis Patterson is a 38 y.o. female.    HPI  38-year-old female present to the office today desire management option for her pelvic pain  . The current episode started 1 to 2 months ago. The problem occurs constantly. The problem has been unchanged. The pain is mild.  Severe at the time of the menses  The problem affects both sides. Associated symptoms include abdominal pain, back pain, constipation, frequency, nausea, urgency and vomiting. Pertinent negatives include no diarrhea or dysuria. Nothing aggravates the symptoms. She has tried nothing for the symptoms. She is not sexually active.   Patient's pain worse at the time of the menses patient also has heavy bleeding at the time of the menses  Patient tried Depo-Provera with no improvement of the pain that is getting worse and affecting quality of life  Patient has ultrasound result reviewed and discussed with patient          The following portions of the patient's history were reviewed and updated as appropriate: allergies, current medications,  "past family history, past medical history, past social history, past surgical history and problem list.    Review of Systems    Gastrointestinal:  Positive for abdominal pain, constipation, nausea and vomiting. Negative for diarrhea.   Genitourinary:  Positive for frequency, pelvic pain and urgency. Negative for dysuria.   Musculoskeletal:  Positive for back pain.   Objective:      /62 (BP Location: Left arm, Patient Position: Sitting, Cuff Size: Adult)   Ht 5' 1\" (1.549 m)   Wt 73.7 kg (162 lb 6.4 oz)   BMI 30.69 kg/m²     UTERUS:  The uterus is anteverted in position, measuring 7.0 x 2.8 x 5.0 cm.  The uterus has a bulbous contour and coarsened heterogeneous echotexture, most consistent with the appearance of diffuse adenomyosis. Right fundal intramural fibroid with a submucosal component (FIGO 2) measuring 1.4 x 1.7 x 1.1 cm, previously 0.9 x 0.9   x 0.9.  Nabothian cyst(s) present, cervix otherwise within normal limits.     ENDOMETRIUM:  The endometrial echo complex has an AP caliber of 5.0 mm.  Appearance within normal limits.     OVARIES/ADNEXA:  Right ovary: 1.9 x 2.2 x 1.5 cm. 3.2 mL.  Ovarian Doppler flow is within normal limits.  No suspicious ovarian or adnexal abnormality.     Left ovary: 2.1 x 1.8 x 1.2 cm. 2.3 mL.  Ovarian Doppler flow is within normal limits.  No suspicious ovarian or adnexal abnormality.     OTHER:  Small amount of simple free fluid in the pelvis, likely physiologic in this premenopausal female.     IMPRESSION:     Diffuse adenomyosis with slightly increased size of a right fundal fibroid with submucosal component.     Ultrasound result reviewed and discussed with patient as patient pain did not respond to Depo-Provera finding of diffuse adenomyosis with fibroid uterus pain is affecting quality of life patient desired to proceed with definitive management patient to see GI as well as she has some GI complaints at the time of the visit aspect of her pelvic pain  Patient " desired to proceed with hysterectomy procedure explained and discussed with patient risk of hysterectomy include bleeding, infection, blood transfusion, risk of bowel injury risk of bladder injury and risk of urinary tract injury risk of anesthesia all explained and discussed with patient risk of needing another surgery as well explained all patient questions answered in details and patient was satisfied     Physical Exam  Vitals and nursing note reviewed.   Constitutional:       Appearance: Normal appearance. She is well-developed.   Neck:      Thyroid: No thyroid mass or thyromegaly.   Cardiovascular:      Rate and Rhythm: Regular rhythm.      Heart sounds: Normal heart sounds.   Pulmonary:      Breath sounds: Normal breath sounds.   Abdominal:      Palpations: Abdomen is soft.      Hernia: No hernia is present.   Genitourinary:     Vagina: Normal. No vaginal discharge, erythema, tenderness or bleeding.      Cervix: No cervical motion tenderness, discharge or friability.      Uterus: Tender. Not enlarged.       Adnexa:         Right: No mass or tenderness.          Left: No mass or tenderness.     Skin:     General: Skin is warm and dry.   Neurological:      Mental Status: She is alert and oriented to person, place, and time.

## 2024-04-04 ENCOUNTER — TELEPHONE (OUTPATIENT)
Dept: OBGYN CLINIC | Facility: CLINIC | Age: 39
End: 2024-04-04

## 2024-04-04 NOTE — TELEPHONE ENCOUNTER
----- Message from Brett Everett MD sent at 4/3/2024  5:33 PM EDT -----    Robotic TL[facility] B/LS       [facility] GYN Department  Surgery Scheduling Sheet    Patient Name: Elis Patterson  : 1985    Provider: Brett Everett MD     Needed: no; Language: N/A    Procedure: exam under anesthesia and robotic assisted total laparoscopic hysterectomy with bilateral salpingectomy. Cystoscopy.     Diagnosis: pelvic pain adenomyosis , fibroid uterus     Special Needs or Equipment: none    Anesthesia: General anesthesia    Length of stay: outpatient  -Does patient have comorbid conditions that will require close perioperative monitoring prior to safe discharge: no    -The patient has comorbid conditions that will require close perioperative monitoring prior to safe discharge, including N/A.   -This may require acute care beyond the usual and routine recovery period. As such, inpatient admission post-operatively is expected and appropriate, and anticipated hospital length of stay will be >2 midnights.    Pre-Admission Testing Needed: yes   Labs that should be ordered: cbc, hgb A1C, and type and screen    Order PAT that is recommended in prep for procedure?: Yes    Medical Clearance Needed: no; Provider: N/A    MA Form Signed (tubals/hysterectomy): Not Indicated    Surgical Drink Given: no     How many days out of work: 6 week(s)     How many days no drivin week(s)       Is pre op appt needed?  No just needs surgical soap and drinks  Interval for post op appt: 2 week(s)     For Surgical Scheduler:     Surgery Scheduled On: FRI. 24  [place]: [facility]    Pre-op Appt: COMPLETED 24  Post op Appt: TO BE SCHEDULED  Consult/Medical clearance appt: N/A

## 2024-04-12 ENCOUNTER — APPOINTMENT (OUTPATIENT)
Dept: LAB | Facility: CLINIC | Age: 39
End: 2024-04-12
Payer: COMMERCIAL

## 2024-04-12 DIAGNOSIS — D21.9 FIBROIDS: ICD-10-CM

## 2024-04-12 DIAGNOSIS — N80.03 ADENOMYOSIS: ICD-10-CM

## 2024-04-12 DIAGNOSIS — Z01.812 ENCOUNTER FOR PRE-OPERATIVE LABORATORY TESTING: ICD-10-CM

## 2024-04-12 DIAGNOSIS — R10.2 PELVIC PAIN: ICD-10-CM

## 2024-04-12 LAB
ABO GROUP BLD: NORMAL
BASOPHILS # BLD AUTO: 0.14 THOUSANDS/ÂΜL (ref 0–0.1)
BASOPHILS NFR BLD AUTO: 1 % (ref 0–1)
BLD GP AB SCN SERPL QL: NEGATIVE
EOSINOPHIL # BLD AUTO: 0.17 THOUSAND/ÂΜL (ref 0–0.61)
EOSINOPHIL NFR BLD AUTO: 1 % (ref 0–6)
ERYTHROCYTE [DISTWIDTH] IN BLOOD BY AUTOMATED COUNT: 12.5 % (ref 11.6–15.1)
EST. AVERAGE GLUCOSE BLD GHB EST-MCNC: 108 MG/DL
HBA1C MFR BLD: 5.4 %
HCT VFR BLD AUTO: 42.7 % (ref 34.8–46.1)
HGB BLD-MCNC: 14.5 G/DL (ref 11.5–15.4)
IMM GRANULOCYTES # BLD AUTO: 0.09 THOUSAND/UL (ref 0–0.2)
IMM GRANULOCYTES NFR BLD AUTO: 1 % (ref 0–2)
LYMPHOCYTES # BLD AUTO: 2.4 THOUSANDS/ÂΜL (ref 0.6–4.47)
LYMPHOCYTES NFR BLD AUTO: 18 % (ref 14–44)
MCH RBC QN AUTO: 30.7 PG (ref 26.8–34.3)
MCHC RBC AUTO-ENTMCNC: 34 G/DL (ref 31.4–37.4)
MCV RBC AUTO: 91 FL (ref 82–98)
MONOCYTES # BLD AUTO: 0.66 THOUSAND/ÂΜL (ref 0.17–1.22)
MONOCYTES NFR BLD AUTO: 5 % (ref 4–12)
NEUTROPHILS # BLD AUTO: 10.06 THOUSANDS/ÂΜL (ref 1.85–7.62)
NEUTS SEG NFR BLD AUTO: 74 % (ref 43–75)
NRBC BLD AUTO-RTO: 0 /100 WBCS
PLATELET # BLD AUTO: 264 THOUSANDS/UL (ref 149–390)
PMV BLD AUTO: 10 FL (ref 8.9–12.7)
RBC # BLD AUTO: 4.72 MILLION/UL (ref 3.81–5.12)
RH BLD: POSITIVE
SPECIMEN EXPIRATION DATE: NORMAL
WBC # BLD AUTO: 13.52 THOUSAND/UL (ref 4.31–10.16)

## 2024-04-12 PROCEDURE — 83036 HEMOGLOBIN GLYCOSYLATED A1C: CPT

## 2024-04-12 PROCEDURE — 85025 COMPLETE CBC W/AUTO DIFF WBC: CPT

## 2024-04-12 PROCEDURE — 36415 COLL VENOUS BLD VENIPUNCTURE: CPT

## 2024-04-12 PROCEDURE — 86900 BLOOD TYPING SEROLOGIC ABO: CPT

## 2024-04-12 PROCEDURE — 86901 BLOOD TYPING SEROLOGIC RH(D): CPT

## 2024-04-12 PROCEDURE — 86850 RBC ANTIBODY SCREEN: CPT

## 2024-04-15 ENCOUNTER — OFFICE VISIT (OUTPATIENT)
Dept: OBGYN CLINIC | Facility: CLINIC | Age: 39
End: 2024-04-15
Payer: COMMERCIAL

## 2024-04-15 VITALS — HEIGHT: 61 IN | BODY MASS INDEX: 30.58 KG/M2 | WEIGHT: 162 LBS

## 2024-04-15 DIAGNOSIS — G56.00 MILD CARPAL TUNNEL SYNDROME, UNSPECIFIED LATERALITY: ICD-10-CM

## 2024-04-15 DIAGNOSIS — G56.03 CARPAL TUNNEL SYNDROME, BILATERAL: Primary | ICD-10-CM

## 2024-04-15 PROCEDURE — 99244 OFF/OP CNSLTJ NEW/EST MOD 40: CPT | Performed by: STUDENT IN AN ORGANIZED HEALTH CARE EDUCATION/TRAINING PROGRAM

## 2024-04-15 PROCEDURE — 20526 THER INJECTION CARP TUNNEL: CPT | Performed by: STUDENT IN AN ORGANIZED HEALTH CARE EDUCATION/TRAINING PROGRAM

## 2024-04-15 RX ADMIN — BETAMETHASONE SODIUM PHOSPHATE AND BETAMETHASONE ACETATE 6 MG: 3; 3 INJECTION, SUSPENSION INTRA-ARTICULAR; INTRALESIONAL; INTRAMUSCULAR; SOFT TISSUE at 15:30

## 2024-04-15 RX ADMIN — ROPIVACAINE HYDROCHLORIDE 1 ML: 5 INJECTION, SOLUTION EPIDURAL; INFILTRATION; PERINEURAL at 15:30

## 2024-04-15 NOTE — PROGRESS NOTES
ORTHOPAEDIC HAND, WRIST, AND ELBOW OFFICE  VISIT      ASSESSMENT/PLAN:      Diagnoses and all orders for this visit:    Carpal tunnel syndrome, bilateral  -     Hand/upper extremity injection: R carpal tunnel  -     Hand/upper extremity injection: L carpal tunnel  -     ropivacaine (NAROPIN) 0.5 % injection 1 mL  -     ropivacaine (NAROPIN) 0.5 % injection 1 mL  -     betamethasone acetate-betamethasone sodium phosphate (CELESTONE) injection 6 mg  -     betamethasone acetate-betamethasone sodium phosphate (CELESTONE) injection 6 mg    Mild carpal tunnel syndrome, unspecified laterality  -     Ambulatory Referral to Orthopedic Surgery          38 y.o. female with mild bilateral carpal tunnel syndrome  Treatment options and expected outcomes were discussed.  The EMG was reviewed in the office today which demonstrates mild carpal tunnel syndrome.  Discussed non operative versus surgical intervention.  The patient verbalized understanding of exam findings and treatment plan.   The patient was given the opportunity to ask questions.  Questions were answered to the patient's satisfaction.  The patient decided to move forward with non operative treatment options as she is currently schedule to undergo a hysterectomy and will be out of work secondary to this.  The patient consented and underwent bilateral carpal tunnel injections in the office today without any complications.  She can continue with night time bracing.  She can follow up after her hysterectomy.       Follow Up:        To Do Next Visit:  Re-evaluation of current issue      Discussions:  Carpal Tunnel Syndrome: The anatomy and physiology of carpal tunnel syndrome was discussed with the patient today.  Increase pressure localized under the transverse carpal ligament can cause pain, numbness, tingling, or dysesthesias within the median nerve distribution as well as feelings of fatigue, clumsiness, or awkwardness.  These symptoms typically occur at night and  worse in the morning upon waking.  Eventually, untreated carpal tunnel syndrome can result in weakness and permanent loss of muscle within the thenar compartment of the hand.  Treatment options were discussed with the patient.  Conservative treatment includes nocturnal resting splints to keep the nerve in a neutral position, ergonomic changes within the work or home environment, activity modification, and tendon gliding exercises. Vitamin B6 one tablet daily over the counter may helpful to reduce symptoms.   Steroid injections within the carpal canal can help a majority of patients, however this is often self-limited in a majority of patients.  Surgical intervention to divide the transverse carpal ligament typically results in a long-lasting relief of the patient's complaints, with the recurrence rate of less than 1%.                                                                                                                                                                               Bubba Yepez MD  Attending, Orthopaedic Surgery  Hand, Wrist, and Elbow Surgery  [facility]    ______________________________________________________________________________________________    CHIEF COMPLAINT:  Chief Complaint   Patient presents with   • Right Hand - Pain   • Left Hand - Pain       SUBJECTIVE:  Patient is a 38 y.o. LHD female who presents today for evaluation and treatment of bilateral hand numbness and tingling. The patient states she has a history of carpal tunnel syndrome. She states this has been ongoing for the past 5 years. She notes numbness and tingling to all digits. She has tried bracing without relief. She did undergo an EMG which we have available to review. The patient is referred by Korin Caballero MD.      Occupation: housekeeping     I have personally reviewed all the relevant PMH, PSH, SH, FH, Medications and allergies      PAST MEDICAL HISTORY:  Past Medical History:    Diagnosis Date   • Anxiety    • Asthma    • Depression    • Fibroid    • Migraine        PAST SURGICAL HISTORY:  History reviewed. No pertinent surgical history.    FAMILY HISTORY:  Family History   Problem Relation Age of Onset   • Heart disease Mother    • Hyperlipidemia Mother    • Hypertension Mother    • Diabetes Mother    • Asthma Mother    • No Known Problems Father    • Cancer Other         Pt states dads side has cancer unsure what type of cancer.       SOCIAL HISTORY:  Social History     Tobacco Use   • Smoking status: Every Day     Current packs/day: 0.25     Average packs/day: 0.3 packs/day for 16.3 years (4.1 ttl pk-yrs)     Types: Cigarettes     Start date: 2008   • Smokeless tobacco: Never   Vaping Use   • Vaping status: Former   • Substances: Nicotine   Substance Use Topics   • Alcohol use: Not Currently     Comment: rare   • Drug use: Never       MEDICATIONS:    Current Outpatient Medications:   •  albuterol (PROVENTIL HFA,VENTOLIN HFA) 90 mcg/act inhaler, Inhale 2 puffs every 4 (four) hours, Disp: , Rfl:   •  famotidine (PEPCID) 40 MG tablet, TAKE 1 TABLET BY MOUTH EVERY DAY, Disp: 90 tablet, Rfl: 2  •  medroxyPROGESTERone (DEPO-PROVERA) 150 mg/mL injection, Inject 1 mL (150 mg total) into a muscle every 3 (three) months, Disp: 1 mL, Rfl: 2  No current facility-administered medications for this visit.    ALLERGIES:  No Known Allergies        REVIEW OF SYSTEMS:  Musculoskeletal:        As noted in HPI.   All other systems reviewed and are negative.    VITALS:  There were no vitals filed for this visit.    LABS:  HgA1c:   Lab Results   Component Value Date    HGBA1C 5.4 04/12/2024     BMP:   Lab Results   Component Value Date    CALCIUM 8.7 04/01/2023    K 4.0 04/01/2023    CO2 29 04/01/2023     04/01/2023    BUN 12 04/01/2023    CREATININE 0.77 04/01/2023       _____________________________________________________  PHYSICAL EXAMINATION:  General: Well developed and well nourished, alert &  oriented x 3, appears comfortable  Psychiatric: Normal  HEENT: Normocephalic, Atraumatic Trachea Midline, No torticollis  Pulmonary: No audible wheezing or respiratory distress   Abdomen/GI: Non tender, non distended   Cardiovascular: No pitting edema, 2+ radial pulse   Skin: No masses, erythema, lacerations, fluctation, ulcerations  Neurovascular: Sensation Intact to the Median, Ulnar, Radial Nerve, Motor Intact to the Median, Ulnar, Radial Nerve, and Pulses Intact  Musculoskeletal: Normal, except as noted in detailed exam and in HPI.      MUSCULOSKELETAL EXAMINATION:  Bilateral hand  + tinel's at the wrist   5/5 APB  Full fist  Compartments soft  Brisk capillary refill     ___________________________________________________  STUDIES REVIEWED:  EMG was personally reviewed and independently interpreted by Dr. Yepez and demonstrates bilateral mild carpal tunnel syndrome.          PROCEDURES PERFORMED:  Hand/upper extremity injection: R carpal tunnel  [place] Protocol:  Consent: Verbal consent obtained.  Consent given by: patient  Patient identity confirmed: verbally with patient  Supporting Documentation  Indications: pain   Procedure Details  Condition:carpal tunnel syndrome Site: R carpal tunnel   Preparation: Patient was prepped and draped in the usual sterile fashion  Needle size: 25 G  Ultrasound guidance: no  Medications administered: 6 mg betamethasone acetate-betamethasone sodium phosphate 6 (3-3) mg/mL; 1 mL ropivacaine 0.5 %  Patient tolerance: patient tolerated the procedure well with no immediate complications  Dressing:  Sterile dressing applied       Hand/upper extremity injection: L carpal tunnel  [place] Protocol:  Consent: Verbal consent obtained.  Consent given by: patient  Patient identity confirmed: verbally with patient  Supporting Documentation  Indications: pain   Procedure Details  Condition:carpal tunnel syndrome Site: L carpal tunnel   Preparation: Patient was prepped and draped in  the usual sterile fashion  Needle size: 22 G  Ultrasound guidance: no  Medications administered: 1 mL ropivacaine 0.5 %; 6 mg betamethasone acetate-betamethasone sodium phosphate 6 (3-3) mg/mL  Patient tolerance: patient tolerated the procedure well with no immediate complications  Dressing:  Sterile dressing applied              _____________________________________________________      Scribe Attestation    I,:  Selam Keen MA am acting as a scribe while in the presence of the attending physician.:       I,:  Bubba Yepez MD personally performed the services described in this documentation    as scribed in my presence.:

## 2024-04-16 RX ORDER — BETAMETHASONE SODIUM PHOSPHATE AND BETAMETHASONE ACETATE 3; 3 MG/ML; MG/ML
6 INJECTION, SUSPENSION INTRA-ARTICULAR; INTRALESIONAL; INTRAMUSCULAR; SOFT TISSUE
Status: COMPLETED | OUTPATIENT
Start: 2024-04-15 | End: 2024-04-15

## 2024-04-16 RX ORDER — ROPIVACAINE HYDROCHLORIDE 5 MG/ML
1 INJECTION, SOLUTION EPIDURAL; INFILTRATION; PERINEURAL
Status: COMPLETED | OUTPATIENT
Start: 2024-04-15 | End: 2024-04-15

## 2024-04-26 ENCOUNTER — PATIENT MESSAGE (OUTPATIENT)
Dept: OBGYN CLINIC | Facility: CLINIC | Age: 39
End: 2024-04-26

## 2024-05-03 ENCOUNTER — OFFICE VISIT (OUTPATIENT)
Dept: FAMILY MEDICINE CLINIC | Facility: CLINIC | Age: 39
End: 2024-05-03
Payer: COMMERCIAL

## 2024-05-03 VITALS
HEART RATE: 81 BPM | OXYGEN SATURATION: 94 % | BODY MASS INDEX: 30.21 KG/M2 | SYSTOLIC BLOOD PRESSURE: 118 MMHG | RESPIRATION RATE: 16 BRPM | TEMPERATURE: 97.2 F | HEIGHT: 61 IN | DIASTOLIC BLOOD PRESSURE: 72 MMHG | WEIGHT: 160 LBS

## 2024-05-03 DIAGNOSIS — J45.21 MILD INTERMITTENT ASTHMA WITH ACUTE EXACERBATION: ICD-10-CM

## 2024-05-03 DIAGNOSIS — L24.9 IRRITANT CONTACT DERMATITIS, UNSPECIFIED TRIGGER: ICD-10-CM

## 2024-05-03 DIAGNOSIS — J40 BRONCHITIS: ICD-10-CM

## 2024-05-03 DIAGNOSIS — F17.219 CIGARETTE NICOTINE DEPENDENCE WITH NICOTINE-INDUCED DISORDER: Primary | ICD-10-CM

## 2024-05-03 DIAGNOSIS — R79.89 ABNORMAL CBC: ICD-10-CM

## 2024-05-03 PROCEDURE — 99214 OFFICE O/P EST MOD 30 MIN: CPT | Performed by: FAMILY MEDICINE

## 2024-05-03 RX ORDER — ALBUTEROL SULFATE 90 UG/1
2 AEROSOL, METERED RESPIRATORY (INHALATION) EVERY 4 HOURS
Qty: 18 G | Refills: 1 | Status: SHIPPED | OUTPATIENT
Start: 2024-05-03

## 2024-05-03 RX ORDER — TRIAMCINOLONE ACETONIDE 5 MG/G
CREAM TOPICAL 2 TIMES DAILY
Qty: 30 G | Refills: 0 | Status: SHIPPED | OUTPATIENT
Start: 2024-05-03

## 2024-05-03 RX ORDER — AZITHROMYCIN 250 MG/1
TABLET, FILM COATED ORAL
Qty: 6 TABLET | Refills: 0 | Status: SHIPPED | OUTPATIENT
Start: 2024-05-03 | End: 2024-05-08

## 2024-05-03 RX ORDER — BENZONATATE 200 MG/1
200 CAPSULE ORAL 3 TIMES DAILY PRN
Qty: 30 CAPSULE | Refills: 0 | Status: SHIPPED | OUTPATIENT
Start: 2024-05-03

## 2024-05-03 NOTE — PROGRESS NOTES
Name: Elis Patterson      : 1985      MRN: [id number]  Encounter Provider: Korin Caballero MD  Encounter Date: 5/3/2024   Encounter department: [facility]    Assessment & Plan     1. Cigarette nicotine dependence with nicotine-induced disorder    2. Mild intermittent asthma with acute exacerbation  Assessment & Plan:  Nit wheezing now more cough    Orders:  -     albuterol (PROVENTIL HFA,VENTOLIN HFA) 90 mcg/act inhaler; Inhale 2 puffs every 4 (four) hours    3. Irritant contact dermatitis, unspecified trigger  Assessment & Plan:  Contact  dermatitis acrylic nails likely  redness desquamation on all fingers  following cuticles     Orders:  -     triamcinolone (KENALOG) 0.5 % cream; Apply topically 2 (two) times a day    4. Bronchitis  Assessment & Plan:  Persistent likely initial viral illness will treat zpak     Orders:  -     azithromycin (ZITHROMAX) 250 mg tablet; Take 2 tablets today then 1 tablet daily x 4 days  -     benzonatate (TESSALON) 200 MG capsule; Take 1 capsule (200 mg total) by mouth 3 (three) times a day as needed for cough    5. Abnormal CBC  Assessment & Plan:  Recheck  cbc    Orders:  -     CBC and differential; Future           Subjective      Pt with cough congestiin sore throat     Shortness of Breath  The current episode started 1 to 4 weeks ago. The problem occurs every several days. The problem is unchanged. Associated symptoms include coughing, orthopnea and rhinorrhea. Pertinent negatives include no chest pain, leg swelling, sore throat (had sore throat now improved) or wheezing. Nothing aggravates the symptoms.   Cough  Associated symptoms include headaches (has been improving  worse 2 weeks ago), a rash (on all fingertips at base ofnails burning since manicure) and rhinorrhea. Pertinent negatives include no chest pain, chills, ear pain, fever, myalgias, sore throat (had sore throat now improved), shortness of breath or wheezing.     Review of  "Systems   Constitutional:  Negative for chills and fever.   HENT:  Positive for rhinorrhea. Negative for congestion, ear pain, mouth sores, sinus pressure, sinus pain, sore throat (had sore throat now improved) and trouble swallowing.    Eyes:  Negative for discharge.   Respiratory:  Positive for cough. Negative for chest tightness, shortness of breath and wheezing.    Cardiovascular:  Positive for orthopnea. Negative for chest pain and leg swelling.   Gastrointestinal:  Negative for abdominal pain and vomiting.   Musculoskeletal:  Negative for arthralgias, myalgias and neck pain.   Skin:  Positive for rash (on all fingertips at base ofnails burning since manicure).   Neurological:  Positive for headaches (has been improving  worse 2 weeks ago).       Current Outpatient Medications on File Prior to Visit   Medication Sig   • famotidine (PEPCID) 40 MG tablet TAKE 1 TABLET BY MOUTH EVERY DAY   • medroxyPROGESTERone (DEPO-PROVERA) 150 mg/mL injection Inject 1 mL (150 mg total) into a muscle every 3 (three) months   • [DISCONTINUED] albuterol (PROVENTIL HFA,VENTOLIN HFA) 90 mcg/act inhaler Inhale 2 puffs every 4 (four) hours       Objective     /72 (BP Location: Left arm, Patient Position: Sitting, Cuff Size: Standard)   Pulse 81   Temp (!) 97.2 °F (36.2 °C) (Tympanic)   Resp 16   Ht 5' 1\" (1.549 m)   Wt 72.6 kg (160 lb)   SpO2 94%   BMI 30.23 kg/m²     Physical Exam  Constitutional:       General: She is not in acute distress.     Appearance: Normal appearance. She is well-developed. She is not ill-appearing.   HENT:      Right Ear: Tympanic membrane and ear canal normal.      Left Ear: Tympanic membrane and ear canal normal.      Nose: Nose normal.      Right Sinus: No maxillary sinus tenderness or frontal sinus tenderness.      Left Sinus: No maxillary sinus tenderness or frontal sinus tenderness.      Mouth/Throat:      Mouth: Mucous membranes are moist.      Pharynx: No oropharyngeal exudate or " posterior oropharyngeal erythema.      Tonsils: No tonsillar exudate.   Eyes:      General:         Right eye: No discharge.         Left eye: No discharge.      Conjunctiva/sclera: Conjunctivae normal.      Pupils: Pupils are equal, round, and reactive to light.   Cardiovascular:      Rate and Rhythm: Normal rate.      Heart sounds: Normal heart sounds.   Pulmonary:      Effort: Pulmonary effort is normal.      Breath sounds: Normal breath sounds.   Musculoskeletal:      Cervical back: Normal range of motion.   Lymphadenopathy:      Cervical: No cervical adenopathy.   Skin:     Findings: Rash (along all fingers near cuticle redness and desquamation with burning  after recent manicure) present.   Neurological:      General: No focal deficit present.      Mental Status: She is alert. Mental status is at baseline.   Psychiatric:         Mood and Affect: Mood normal.       Korin Caballero MD    Answers submitted by the patient for this visit:  Shortness of Breath Questionnaire (Submitted on 4/26/2024)  Chief Complaint: Shortness of breath  Chronicity: recurrent  Episode duration: 15 Minutes  claudication: Yes  coryza: Yes  hemoptysis: No  leg pain: Yes  PND: No  sputum production: Yes  swollen glands: No  syncope: No

## 2024-05-03 NOTE — ASSESSMENT & PLAN NOTE
Contact  dermatitis acrylic nails likely  redness desquamation on all fingers  following cuticles

## 2024-05-10 ENCOUNTER — APPOINTMENT (OUTPATIENT)
Dept: LAB | Facility: CLINIC | Age: 39
End: 2024-05-10
Payer: COMMERCIAL

## 2024-05-10 DIAGNOSIS — N80.03 ADENOMYOSIS: ICD-10-CM

## 2024-05-10 DIAGNOSIS — Z01.812 ENCOUNTER FOR PRE-OPERATIVE LABORATORY TESTING: ICD-10-CM

## 2024-05-10 DIAGNOSIS — R79.89 ABNORMAL CBC: ICD-10-CM

## 2024-05-10 DIAGNOSIS — D25.9 UTERINE LEIOMYOMA, UNSPECIFIED LOCATION: ICD-10-CM

## 2024-05-10 DIAGNOSIS — R10.2 PELVIC PAIN: ICD-10-CM

## 2024-05-10 LAB
BASOPHILS # BLD AUTO: 0.07 THOUSANDS/ÂΜL (ref 0–0.1)
BASOPHILS NFR BLD AUTO: 1 % (ref 0–1)
EOSINOPHIL # BLD AUTO: 0.14 THOUSAND/ÂΜL (ref 0–0.61)
EOSINOPHIL NFR BLD AUTO: 2 % (ref 0–6)
ERYTHROCYTE [DISTWIDTH] IN BLOOD BY AUTOMATED COUNT: 12.7 % (ref 11.6–15.1)
EST. AVERAGE GLUCOSE BLD GHB EST-MCNC: 111 MG/DL
HBA1C MFR BLD: 5.5 %
HCT VFR BLD AUTO: 44.3 % (ref 34.8–46.1)
HGB BLD-MCNC: 14.7 G/DL (ref 11.5–15.4)
IMM GRANULOCYTES # BLD AUTO: 0.14 THOUSAND/UL (ref 0–0.2)
IMM GRANULOCYTES NFR BLD AUTO: 2 % (ref 0–2)
LYMPHOCYTES # BLD AUTO: 2.14 THOUSANDS/ÂΜL (ref 0.6–4.47)
LYMPHOCYTES NFR BLD AUTO: 26 % (ref 14–44)
MCH RBC QN AUTO: 30.4 PG (ref 26.8–34.3)
MCHC RBC AUTO-ENTMCNC: 33.2 G/DL (ref 31.4–37.4)
MCV RBC AUTO: 92 FL (ref 82–98)
MONOCYTES # BLD AUTO: 0.41 THOUSAND/ÂΜL (ref 0.17–1.22)
MONOCYTES NFR BLD AUTO: 5 % (ref 4–12)
NEUTROPHILS # BLD AUTO: 5.33 THOUSANDS/ÂΜL (ref 1.85–7.62)
NEUTS SEG NFR BLD AUTO: 64 % (ref 43–75)
NRBC BLD AUTO-RTO: 0 /100 WBCS
PLATELET # BLD AUTO: 233 THOUSANDS/UL (ref 149–390)
PMV BLD AUTO: 10.4 FL (ref 8.9–12.7)
RBC # BLD AUTO: 4.84 MILLION/UL (ref 3.81–5.12)
WBC # BLD AUTO: 8.23 THOUSAND/UL (ref 4.31–10.16)

## 2024-05-10 PROCEDURE — 36415 COLL VENOUS BLD VENIPUNCTURE: CPT

## 2024-05-10 PROCEDURE — 86901 BLOOD TYPING SEROLOGIC RH(D): CPT | Performed by: OBSTETRICS & GYNECOLOGY

## 2024-05-10 PROCEDURE — 85025 COMPLETE CBC W/AUTO DIFF WBC: CPT

## 2024-05-10 PROCEDURE — 86850 RBC ANTIBODY SCREEN: CPT | Performed by: OBSTETRICS & GYNECOLOGY

## 2024-05-10 PROCEDURE — 86900 BLOOD TYPING SEROLOGIC ABO: CPT | Performed by: OBSTETRICS & GYNECOLOGY

## 2024-05-10 PROCEDURE — 83036 HEMOGLOBIN GLYCOSYLATED A1C: CPT

## 2024-05-11 ENCOUNTER — LAB REQUISITION (OUTPATIENT)
Dept: LAB | Facility: HOSPITAL | Age: 39
End: 2024-05-11
Payer: COMMERCIAL

## 2024-05-11 DIAGNOSIS — D25.9 LEIOMYOMA OF UTERUS, UNSPECIFIED: ICD-10-CM

## 2024-05-11 LAB
ABO GROUP BLD: NORMAL
BLD GP AB SCN SERPL QL: NEGATIVE
RH BLD: POSITIVE
SPECIMEN EXPIRATION DATE: NORMAL

## 2024-05-13 ENCOUNTER — TELEPHONE (OUTPATIENT)
Age: 39
End: 2024-05-13

## 2024-05-13 DIAGNOSIS — Z01.812 ENCOUNTER FOR PRE-OPERATIVE LABORATORY TESTING: Primary | ICD-10-CM

## 2024-05-13 DIAGNOSIS — R10.2 PELVIC PAIN: ICD-10-CM

## 2024-05-13 DIAGNOSIS — D21.9 FIBROIDS: ICD-10-CM

## 2024-05-13 DIAGNOSIS — N80.03 ADENOMYOSIS: ICD-10-CM

## 2024-05-13 NOTE — TELEPHONE ENCOUNTER
Spoke with pt as per Dr. Everett pt has to repeat only type and screen 2 days before surgery at the facility where she will be having her surgery. Pt is aware.

## 2024-05-13 NOTE — TELEPHONE ENCOUNTER
Patient went to [facility] to get blood work done that had been ordered by another provider and the lab also processed the blood work ordered by Dr. Everett. Per the letter, patient is to get the blood work done within 28 days of the surgery date, 6/28/24. Patient is inquiring if she will need to repeat these labs since they had been done on 5/10/24.

## 2024-05-28 ENCOUNTER — OFFICE VISIT (OUTPATIENT)
Dept: GASTROENTEROLOGY | Facility: CLINIC | Age: 39
End: 2024-05-28
Payer: COMMERCIAL

## 2024-05-28 VITALS
SYSTOLIC BLOOD PRESSURE: 122 MMHG | WEIGHT: 162 LBS | BODY MASS INDEX: 30.58 KG/M2 | DIASTOLIC BLOOD PRESSURE: 86 MMHG | HEIGHT: 61 IN | HEART RATE: 76 BPM

## 2024-05-28 DIAGNOSIS — R10.84 GENERALIZED ABDOMINAL PAIN: ICD-10-CM

## 2024-05-28 DIAGNOSIS — R10.13 DYSPEPSIA: Primary | ICD-10-CM

## 2024-05-28 PROCEDURE — 99243 OFF/OP CNSLTJ NEW/EST LOW 30: CPT | Performed by: INTERNAL MEDICINE

## 2024-05-28 RX ORDER — ESOMEPRAZOLE MAGNESIUM 40 MG/1
40 CAPSULE, DELAYED RELEASE ORAL EVERY MORNING
Qty: 30 CAPSULE | Refills: 5 | Status: SHIPPED | OUTPATIENT
Start: 2024-05-28

## 2024-05-28 NOTE — PROGRESS NOTES
[facility] - Outpatient Consultation  Elis Patterson 38 y.o. female MRN: [id number]  Encounter: [id number]        ASSESSMENT AND PLAN:       Diagnoses and all orders for this visit:    Dyspepsia  Symptoms most suggestive of GERD, though may have other contributors including hiatal hernia, peptic ulcer disease, H. pylori.  Her uterine fibroids may be contributing to her abdominal discomfort to some degree as well.  Will plan trial of PPI.  If symptoms persist despite this, and after her hysterectomy, will likely plan for further evaluation with EGD    -     esomeprazole (NexIUM) 40 MG capsule; Take 1 capsule (40 mg total) by mouth every morning    Generalized abdominal pain  -     Ambulatory Referral to Gastroenterology        ______________________________________________________________________    HPI: Patient with a history of uterine fibroids planning to undergo hysterectomy 1 month from now presents with approximately 1 year or more of heartburn and regurgitation with upper abdominal pain.  She has been on famotidine 40 mg daily with some modest improvement of symptoms.  Has tried over-the-counter Tums and Prilosec with no significant change.  No significant nausea or vomiting, fever or chills, jaundice or rash, changes in bowel habit, blood in stool, unexplained weight loss, early satiety.  Has a family history of several family members with Crohn's and ulcerative colitis.      REVIEW OF SYSTEMS:    Review of Systems   Constitutional: Negative for fever.   Gastrointestinal:  Positive for abdominal pain and constipation. Negative for anorexia, diarrhea and flatus.   Genitourinary:  Negative for dysuria and frequency.    Answers submitted by the patient for this visit:  Abdominal Pain Questionnaire (Submitted on 5/21/2024)  Chief Complaint: Abdominal pain  Chronicity: recurrent  Onset: more than 1 year ago  Onset quality: undetermined  Frequency: every several days  Episode  "duration: 3 Days  Progression since onset: unchanged  Pain location: generalized abdominal region  Pain - numeric: 4/10  Pain quality: cramping  Radiates to: does not radiate  arthralgias: No  belching: No  Aggravated by: nothing  Relieved by: nothing      Historical Information   Past Medical History:   Diagnosis Date    Anxiety     Asthma     Depression     Fibroid     GERD (gastroesophageal reflux disease)     Migraine      History reviewed. No pertinent surgical history.  Social History   Social History     Substance and Sexual Activity   Alcohol Use Not Currently    Comment: rare     Social History     Substance and Sexual Activity   Drug Use Never     Social History     Tobacco Use   Smoking Status Every Day    Current packs/day: 0.25    Average packs/day: 0.3 packs/day for 16.4 years (4.1 ttl pk-yrs)    Types: Cigarettes    Start date: 2008   Smokeless Tobacco Never     Family History   Problem Relation Age of Onset    Heart disease Mother     Hyperlipidemia Mother     Hypertension Mother     Diabetes Mother     Asthma Mother     No Known Problems Father     Cancer Other         Pt states dads side has cancer unsure what type of cancer.       Meds/Allergies       Current Outpatient Medications:     albuterol (PROVENTIL HFA,VENTOLIN HFA) 90 mcg/act inhaler    benzonatate (TESSALON) 200 MG capsule    esomeprazole (NexIUM) 40 MG capsule    medroxyPROGESTERone (DEPO-PROVERA) 150 mg/mL injection    triamcinolone (KENALOG) 0.5 % cream    No Known Allergies        Objective     Blood pressure 122/86, pulse 76, height 5' 1\" (1.549 m), weight 73.5 kg (162 lb), not currently breastfeeding. Body mass index is 30.61 kg/m².        PHYSICAL EXAM:      Physical Exam  Vitals and nursing note reviewed.   Constitutional:       General: She is not in acute distress.     Appearance: She is not ill-appearing.   HENT:      Head: Normocephalic and atraumatic.      Mouth/Throat:      Comments: Poor dentition  Eyes:      General: No " scleral icterus.     Extraocular Movements: Extraocular movements intact.   Cardiovascular:      Rate and Rhythm: Normal rate.   Pulmonary:      Effort: Pulmonary effort is normal. No respiratory distress.   Abdominal:      General: There is no distension.      Palpations: Abdomen is soft.      Tenderness: There is no abdominal tenderness. There is no guarding or rebound.   Skin:     General: Skin is warm and dry.      Coloration: Skin is not cyanotic.      Findings: No erythema.   Neurological:      General: No focal deficit present.      Mental Status: She is alert and oriented to person, place, and time.   Psychiatric:         Mood and Affect: Mood normal.         Behavior: Behavior normal.              Lab Results:   No visits with results within 1 Day(s) from this visit.   Latest known visit with results is:   Lab Requisition on 05/10/2024   Component Date Value    ABO Grouping 05/10/2024 O     Rh Factor 05/10/2024 Positive     Antibody Screen 05/10/2024 Negative     Specimen Expiration Date 05/10/2024 [id number]          Radiology Results:   No results found.

## 2024-06-07 ENCOUNTER — OFFICE VISIT (OUTPATIENT)
Dept: FAMILY MEDICINE CLINIC | Facility: CLINIC | Age: 39
End: 2024-06-07
Payer: COMMERCIAL

## 2024-06-07 VITALS
HEART RATE: 72 BPM | RESPIRATION RATE: 18 BRPM | DIASTOLIC BLOOD PRESSURE: 80 MMHG | TEMPERATURE: 99 F | SYSTOLIC BLOOD PRESSURE: 114 MMHG | OXYGEN SATURATION: 98 % | WEIGHT: 161.2 LBS | BODY MASS INDEX: 30.46 KG/M2

## 2024-06-07 DIAGNOSIS — E66.09 CLASS 1 OBESITY DUE TO EXCESS CALORIES WITHOUT SERIOUS COMORBIDITY WITH BODY MASS INDEX (BMI) OF 30.0 TO 30.9 IN ADULT: ICD-10-CM

## 2024-06-07 DIAGNOSIS — Z00.00 ANNUAL PHYSICAL EXAM: Primary | ICD-10-CM

## 2024-06-07 DIAGNOSIS — F17.219 CIGARETTE NICOTINE DEPENDENCE WITH NICOTINE-INDUCED DISORDER: ICD-10-CM

## 2024-06-07 DIAGNOSIS — J45.40 MODERATE PERSISTENT ASTHMA WITHOUT COMPLICATION: ICD-10-CM

## 2024-06-07 DIAGNOSIS — K21.9 GASTROESOPHAGEAL REFLUX DISEASE WITHOUT ESOPHAGITIS: ICD-10-CM

## 2024-06-07 PROBLEM — J40 BRONCHITIS: Status: RESOLVED | Noted: 2024-05-03 | Resolved: 2024-06-07

## 2024-06-07 PROBLEM — R79.89 ABNORMAL CBC: Status: RESOLVED | Noted: 2024-05-03 | Resolved: 2024-06-07

## 2024-06-07 PROBLEM — E66.811 CLASS 1 OBESITY DUE TO EXCESS CALORIES WITHOUT SERIOUS COMORBIDITY WITH BODY MASS INDEX (BMI) OF 30.0 TO 30.9 IN ADULT: Status: ACTIVE | Noted: 2024-06-07

## 2024-06-07 PROCEDURE — 99395 PREV VISIT EST AGE 18-39: CPT | Performed by: FAMILY MEDICINE

## 2024-06-07 PROCEDURE — 99213 OFFICE O/P EST LOW 20 MIN: CPT | Performed by: FAMILY MEDICINE

## 2024-06-07 RX ORDER — FLUTICASONE FUROATE AND VILANTEROL 100; 25 UG/1; UG/1
1 POWDER RESPIRATORY (INHALATION) DAILY
Qty: 60 BLISTER | Refills: 6 | Status: SHIPPED | OUTPATIENT
Start: 2024-06-07

## 2024-06-07 NOTE — PROGRESS NOTES
Subjective: Patient here for annual physical asthma not well controlled using albuterol daily  still smoking does not wish to stop       Patient ID: Elis Patterson is a 38 y.o. female.    HPI    Past Medical History:   Diagnosis Date   • Anxiety    • Asthma    • Depression    • Fibroid    • GERD (gastroesophageal reflux disease)    • Migraine        Family History   Problem Relation Age of Onset   • Heart disease Mother    • Hyperlipidemia Mother    • Hypertension Mother    • Diabetes Mother    • Asthma Mother    • No Known Problems Father    • Cancer Other         Pt states dads side has cancer unsure what type of cancer.       History reviewed. No pertinent surgical history.     reports that she has been smoking cigarettes. She started smoking about 16 years ago. She has a 4.1 pack-year smoking history. She has never used smokeless tobacco. She reports that she does not currently use alcohol. She reports that she does not use drugs.      Current Outpatient Medications:   •  albuterol (PROVENTIL HFA,VENTOLIN HFA) 90 mcg/act inhaler, Inhale 2 puffs every 4 (four) hours, Disp: 18 g, Rfl: 1  •  esomeprazole (NexIUM) 40 MG capsule, Take 1 capsule (40 mg total) by mouth every morning, Disp: 30 capsule, Rfl: 5  •  Fluticasone Furoate-Vilanterol 100-25 mcg/actuation inhaler, Inhale 1 puff daily Rinse mouth after use., Disp: 60 blister, Rfl: 6  •  benzonatate (TESSALON) 200 MG capsule, Take 1 capsule (200 mg total) by mouth 3 (three) times a day as needed for cough (Patient not taking: Reported on 6/7/2024), Disp: 30 capsule, Rfl: 0    The following portions of the patient's history were reviewed and updated as appropriate: allergies, current medications, past family history, past medical history, past social history, past surgical history and problem list.    Review of Systems   Constitutional:  Negative for appetite change, chills, fatigue and fever.   Respiratory:  Negative for cough, chest tightness and shortness  of breath.    Cardiovascular:  Negative for chest pain, palpitations and leg swelling.   Gastrointestinal:  Negative for abdominal pain, constipation, diarrhea, nausea and vomiting.   Genitourinary:  Negative for difficulty urinating and frequency.   Musculoskeletal:  Negative for arthralgias, back pain, gait problem and neck pain.   Skin:  Negative for rash.   Neurological:  Negative for dizziness, weakness, light-headedness, numbness and headaches.   Hematological:  Does not bruise/bleed easily.   Psychiatric/Behavioral:  Negative for dysphoric mood and sleep disturbance. The patient is not nervous/anxious.          PHQ-2/9 Depression Screening    Little interest or pleasure in doing things: 1 - several days  Feeling down, depressed, or hopeless: 1 - several days       ?      Objective:    /80 (BP Location: Left arm, Patient Position: Sitting, Cuff Size: Large)   Pulse 72   Temp 99 °F (37.2 °C) (Tympanic)   Resp 18   Wt 73.1 kg (161 lb 3.2 oz)   SpO2 98%   BMI 30.46 kg/m²      Physical Exam  Vitals reviewed.   Constitutional:       General: She is not in acute distress.     Appearance: Normal appearance. She is well-developed. She is not ill-appearing.   HENT:      Head: Normocephalic.      Right Ear: Tympanic membrane, ear canal and external ear normal.      Left Ear: Tympanic membrane, ear canal and external ear normal.      Nose: Nose normal.      Mouth/Throat:      Mouth: Mucous membranes are moist.      Pharynx: No oropharyngeal exudate.      Comments: Poor dentition  Eyes:      General: Lids are normal. No scleral icterus.     Extraocular Movements: Extraocular movements intact.      Conjunctiva/sclera: Conjunctivae normal.      Pupils: Pupils are equal, round, and reactive to light.   Neck:      Thyroid: No thyromegaly.      Vascular: No carotid bruit.   Cardiovascular:      Rate and Rhythm: Normal rate and regular rhythm.      Pulses: Normal pulses.      Heart sounds: Normal heart sounds. No  murmur heard.     No friction rub.   Pulmonary:      Effort: Pulmonary effort is normal.      Breath sounds: Normal breath sounds. No wheezing, rhonchi or rales.   Abdominal:      General: Bowel sounds are normal. There is no distension.      Palpations: Abdomen is soft. There is no mass.      Tenderness: There is no abdominal tenderness. There is no guarding.      Hernia: No hernia is present.   Musculoskeletal:         General: Normal range of motion.      Cervical back: Normal range of motion and neck supple.   Lymphadenopathy:      Cervical: No cervical adenopathy.   Skin:     General: Skin is warm and dry.      Findings: No rash.      Comments: No abnormal appearing moles   Neurological:      General: No focal deficit present.      Mental Status: She is alert and oriented to person, place, and time. Mental status is at baseline.      Cranial Nerves: No cranial nerve deficit.      Sensory: No sensory deficit.      Motor: No weakness, tremor or abnormal muscle tone.      Coordination: Coordination normal.      Gait: Gait normal.      Deep Tendon Reflexes: Reflexes normal.   Psychiatric:         Mood and Affect: Mood normal.         Speech: Speech normal.         Behavior: Behavior normal.           Recent Results (from the past 8736 hour(s))   Liquid-based pap, screening    Collection Time: 06/14/23  2:01 PM   Result Value Ref Range    Case Report       Gynecologic Cytology Report                       Case: [id number]                                  Authorizing Provider:  Brett Everett MD          Collected:           06/14/2023 1401              Ordering Location:     [facility] [place]    Received:            06/14/2023 1401                                     [facility]                                                             First Screen:          Pearl Baez                                                                Rescreen:              Alethea Michael                                                                   Specimen:    LIQUID-BASED PAP, SCREENING, Cervix                                                        Primary Interpretation Negative for intraepithelial lesion or malignancy     Interpretation Trichomonas organisms present     Specimen Adequacy       Satisfactory for evaluation. Endocervical/transformation zone component present.    Additional Information       [facility]'s FDA approved ,  and ThinPrep Imaging Duo System are utilized with strict adherence to the 's instruction manual to prepare gynecologic and non-gynecologic cytology specimens for the production of ThinPrep slides as well as for gynecologic ThinPrep imaging. These processes have been validated by our laboratory and/or by the .  The Pap test is not a diagnostic procedure and should not be used as the sole means to detect cervical cancer. It is only a screening procedure to aid in the detection of cervical cancer and its precursors. Both false-negative and false-positive results have been experienced. Your patient's test result should be interpreted in this context together with the history and clinical findings.     HPV High Risk    Collection Time: 06/14/23  2:01 PM    Specimen: Cervix; Thin-Prep Vial   Result Value Ref Range    HPV Other HR Negative Negative    HPV16 Negative Negative    HPV18 Negative Negative   Chlamydia/GC amplified DNA by PCR    Collection Time: 03/14/24 10:12 AM    Specimen: Cervix; Genital   Result Value Ref Range    N gonorrhoeae, DNA Probe Negative Negative    Chlamydia trachomatis, DNA Probe Negative Negative   VAGINOSIS DNA PROBE (AFFIRM)    Collection Time: 03/14/24 10:12 AM    Specimen: Vaginal; Genital   Result Value Ref Range    Trichomonas vaginalis Not Detected Not Detected    Gardnerella vaginalis Detected (A) Not Detected    Candida Species Not Detected Not Detected   Urine culture    Collection Time: 03/15/24  6:16 AM    Specimen: Urine, Clean Catch    Result Value Ref Range    Urine Culture 70,000-79,000 cfu/ml    CBC and differential    Collection Time: 04/12/24  7:00 AM   Result Value Ref Range    WBC 13.52 (H) 4.31 - 10.16 Thousand/uL    RBC 4.72 3.81 - 5.12 Million/uL    Hemoglobin 14.5 11.5 - 15.4 g/dL    Hematocrit 42.7 34.8 - 46.1 %    MCV 91 82 - 98 fL    MCH 30.7 26.8 - 34.3 pg    MCHC 34.0 31.4 - 37.4 g/dL    RDW 12.5 11.6 - 15.1 %    MPV 10.0 8.9 - 12.7 fL    Platelets 264 149 - 390 Thousands/uL    nRBC 0 /100 WBCs    Segmented % 74 43 - 75 %    Immature Grans % 1 0 - 2 %    Lymphocytes % 18 14 - 44 %    Monocytes % 5 4 - 12 %    Eosinophils Relative 1 0 - 6 %    Basophils Relative 1 0 - 1 %    Absolute Neutrophils 10.06 (H) 1.85 - 7.62 Thousands/µL    Absolute Immature Grans 0.09 0.00 - 0.20 Thousand/uL    Absolute Lymphocytes 2.40 0.60 - 4.47 Thousands/µL    Absolute Monocytes 0.66 0.17 - 1.22 Thousand/µL    Eosinophils Absolute 0.17 0.00 - 0.61 Thousand/µL    Basophils Absolute 0.14 (H) 0.00 - 0.10 Thousands/µL   HEMOGLOBIN A1C W/ EAG ESTIMATION    Collection Time: 04/12/24  7:00 AM   Result Value Ref Range    Hemoglobin A1C 5.4 Normal 4.0-5.6%; PreDiabetic 5.7-6.4%; Diabetic >=6.5%; Glycemic control for adults with diabetes <7.0% %     mg/dl   Type and screen    Collection Time: 04/12/24  7:00 AM   Result Value Ref Range    ABO Grouping O     Rh Factor Positive     Antibody Screen Negative     Specimen Expiration Date 20240415    CBC and differential    Collection Time: 05/10/24  7:35 AM   Result Value Ref Range    WBC 8.23 4.31 - 10.16 Thousand/uL    RBC 4.84 3.81 - 5.12 Million/uL    Hemoglobin 14.7 11.5 - 15.4 g/dL    Hematocrit 44.3 34.8 - 46.1 %    MCV 92 82 - 98 fL    MCH 30.4 26.8 - 34.3 pg    MCHC 33.2 31.4 - 37.4 g/dL    RDW 12.7 11.6 - 15.1 %    MPV 10.4 8.9 - 12.7 fL    Platelets 233 149 - 390 Thousands/uL    nRBC 0 /100 WBCs    Segmented % 64 43 - 75 %    Immature Grans % 2 0 - 2 %    Lymphocytes % 26 14 - 44 %    Monocytes % 5  4 - 12 %    Eosinophils Relative 2 0 - 6 %    Basophils Relative 1 0 - 1 %    Absolute Neutrophils 5.33 1.85 - 7.62 Thousands/µL    Absolute Immature Grans 0.14 0.00 - 0.20 Thousand/uL    Absolute Lymphocytes 2.14 0.60 - 4.47 Thousands/µL    Absolute Monocytes 0.41 0.17 - 1.22 Thousand/µL    Eosinophils Absolute 0.14 0.00 - 0.61 Thousand/µL    Basophils Absolute 0.07 0.00 - 0.10 Thousands/µL   Hemoglobin A1C    Collection Time: 05/10/24  7:35 AM   Result Value Ref Range    Hemoglobin A1C 5.5 Normal 4.0-5.6%; PreDiabetic 5.7-6.4%; Diabetic >=6.5%; Glycemic control for adults with diabetes <7.0% %     mg/dl   Type and screen    Collection Time: 05/10/24  7:35 AM   Result Value Ref Range    ABO Grouping O     Rh Factor Positive     Antibody Screen Negative     Specimen Expiration Date 20240513        Laboratory Results: I have personally reviewed the pertinent laboratory results/reports     Radiology/Other Diagnostic Testing Results: I have personally reviewed pertinent reports.      No results found.     Assessment/Plan:  1. Annual physical exam  -     Comprehensive metabolic panel; Future; Expected date: 06/07/2024  -     Lipid panel; Future; Expected date: 06/07/2024  2. Gastroesophageal reflux disease without esophagitis  Assessment & Plan:  Better on nexium  3. Moderate persistent asthma without complication  Assessment & Plan:  Will start maintenance advair since using albuterol daily   Orders:  -     Fluticasone Furoate-Vilanterol 100-25 mcg/actuation inhaler; Inhale 1 puff daily Rinse mouth after use.  4. Cigarette nicotine dependence with nicotine-induced disorder  Assessment & Plan:  Pt does not wish to stop  5. Class 1 obesity due to excess calories without serious comorbidity with body mass index (BMI) of 30.0 to 30.9 in adult  Assessment & Plan:  Discussion on diet and exercise guidelines for weight loss and  health reviewed with pt                      Read package inserts for all medications  "before starting a new medications, call me if you have any questions.    Patient was given opportunity to ask questions and all questions were answered.    Disclaimer: Portions of the record may have been created with voice recognition software. Occasional wrong word or \"sound a like\" substitutions may have occurred due to the inherent limitations of voice recognition software. Read the chart carefully and recognize, using context, where substitutions have occurred. I have used the Epic copy/forward function to compose this note. I have reviewed my current note to ensure it reflects the current patient status, exam, assessment and plan.  "

## 2024-06-10 ENCOUNTER — LAB (OUTPATIENT)
Dept: LAB | Facility: HOSPITAL | Age: 39
End: 2024-06-10
Payer: COMMERCIAL

## 2024-06-10 DIAGNOSIS — Z00.00 ANNUAL PHYSICAL EXAM: ICD-10-CM

## 2024-06-10 LAB
ALBUMIN SERPL BCP-MCNC: 4.2 G/DL (ref 3.5–5)
ALP SERPL-CCNC: 67 U/L (ref 34–104)
ALT SERPL W P-5'-P-CCNC: 13 U/L (ref 7–52)
ANION GAP SERPL CALCULATED.3IONS-SCNC: 8 MMOL/L (ref 4–13)
AST SERPL W P-5'-P-CCNC: 13 U/L (ref 13–39)
BILIRUB SERPL-MCNC: 0.75 MG/DL (ref 0.2–1)
BUN SERPL-MCNC: 14 MG/DL (ref 5–25)
CALCIUM SERPL-MCNC: 8.9 MG/DL (ref 8.4–10.2)
CHLORIDE SERPL-SCNC: 106 MMOL/L (ref 96–108)
CHOLEST SERPL-MCNC: 196 MG/DL
CO2 SERPL-SCNC: 25 MMOL/L (ref 21–32)
CREAT SERPL-MCNC: 0.78 MG/DL (ref 0.6–1.3)
GFR SERPL CREATININE-BSD FRML MDRD: 98 ML/MIN/1.73SQ M
GLUCOSE P FAST SERPL-MCNC: 102 MG/DL (ref 65–99)
HDLC SERPL-MCNC: 37 MG/DL
LDLC SERPL CALC-MCNC: 130 MG/DL (ref 0–100)
NONHDLC SERPL-MCNC: 159 MG/DL
POTASSIUM SERPL-SCNC: 3.9 MMOL/L (ref 3.5–5.3)
PROT SERPL-MCNC: 6.8 G/DL (ref 6.4–8.4)
SODIUM SERPL-SCNC: 139 MMOL/L (ref 135–147)
TRIGL SERPL-MCNC: 145 MG/DL

## 2024-06-10 PROCEDURE — 80053 COMPREHEN METABOLIC PANEL: CPT

## 2024-06-10 PROCEDURE — 80061 LIPID PANEL: CPT

## 2024-06-10 PROCEDURE — 36415 COLL VENOUS BLD VENIPUNCTURE: CPT

## 2024-06-18 RX ORDER — IBUPROFEN 200 MG
TABLET ORAL EVERY 6 HOURS PRN
COMMUNITY

## 2024-06-18 RX ORDER — ACETAMINOPHEN 500 MG
500 TABLET ORAL EVERY 6 HOURS PRN
COMMUNITY

## 2024-06-18 NOTE — PRE-PROCEDURE INSTRUCTIONS
Pre-Surgery Instructions:   Medication Instructions    acetaminophen (TYLENOL) 500 mg tablet Uses PRN- OK to take day of surgery    albuterol (PROVENTIL HFA,VENTOLIN HFA) 90 mcg/act inhaler Uses PRN- OK to take day of surgery    esomeprazole (NexIUM) 40 MG capsule Take day of surgery.    Fluticasone Furoate-Vilanterol 100-25 mcg/actuation inhaler Take day of surgery.    ibuprofen (MOTRIN) 200 mg tablet Stop taking 7 days prior to surgery.    Medication instructions for day surgery reviewed. Please use only a sip of water to take your instructed medications. Avoid aspirin and all over the counter vitamins, supplements and NSAIDS for one week prior to surgery per anesthesia guidelines. Tylenol is ok to take as needed. Inst to bring inhalers w/ her DOS .      You will receive a call one business day prior to surgery with an arrival time and hospital directions. If your surgery is scheduled on a Monday, the hospital will be calling you on the Friday prior to your surgery. If you have not heard from anyone by 8pm, please call the hospital supervisor through the hospital  at [phone]. ([place] [phone] or [place] [phone]).    Do not eat or drink anything after midnight the night before your surgery, including candy, mints, lifesavers, or chewing gum. Do not drink alcohol 24hrs before your surgery. Try not to smoke at least 24hrs before your surgery.   Pt states to get Carb Drinks & CHG soap from office at [facility] on 6/21. Reviewed process during phone call.     Follow the pre surgery showering instructions as listed in the “My Surgical Experience Booklet” or otherwise provided by your surgeon's office. Do not use a blade to shave the surgical area 1 week before surgery. It is okay to use a clean electric clippers up to 24 hours before surgery. Do not apply any lotions, creams, including makeup, cologne, deodorant, or perfumes after showering on the day of your surgery. Do not use dry shampoo, hair  spray, hair gel, or any type of hair products.     No contact lenses, eye make-up, or artificial eyelashes. Remove nail polish, including gel polish, and any artificial, gel, or acrylic nails if possible. Remove all jewelry including rings and body piercing jewelry.     Wear causal clothing that is easy to take on and off. Consider your type of surgery.    Keep any valuables, jewelry, piercings at home. Please bring any specially ordered equipment (sling, braces) if indicated.    Arrange for a responsible person to drive you to and from the hospital on the day of your surgery. Please confirm the visitor policy for the day of your procedure when you receive your phone call with an arrival time.     Call the surgeon's office with any new illnesses, exposures, or additional questions prior to surgery.    Please reference your “My Surgical Experience Booklet” for additional information to prepare for your upcoming surgery.

## 2024-06-19 DIAGNOSIS — R10.13 DYSPEPSIA: ICD-10-CM

## 2024-06-19 RX ORDER — ESOMEPRAZOLE MAGNESIUM 40 MG/1
40 CAPSULE, DELAYED RELEASE ORAL EVERY MORNING
Qty: 90 CAPSULE | Refills: 1 | Status: SHIPPED | OUTPATIENT
Start: 2024-06-19

## 2024-06-21 ENCOUNTER — ANNUAL EXAM (OUTPATIENT)
Dept: OBGYN CLINIC | Facility: CLINIC | Age: 39
End: 2024-06-21
Payer: COMMERCIAL

## 2024-06-21 VITALS
DIASTOLIC BLOOD PRESSURE: 76 MMHG | SYSTOLIC BLOOD PRESSURE: 114 MMHG | HEIGHT: 61 IN | WEIGHT: 160.6 LBS | BODY MASS INDEX: 30.32 KG/M2

## 2024-06-21 DIAGNOSIS — Z01.419 WOMEN'S ANNUAL ROUTINE GYNECOLOGICAL EXAMINATION: Primary | ICD-10-CM

## 2024-06-21 DIAGNOSIS — R10.2 PELVIC PAIN: ICD-10-CM

## 2024-06-21 DIAGNOSIS — D25.9 UTERINE LEIOMYOMA, UNSPECIFIED LOCATION: ICD-10-CM

## 2024-06-21 PROCEDURE — S0612 ANNUAL GYNECOLOGICAL EXAMINA: HCPCS | Performed by: OBSTETRICS & GYNECOLOGY

## 2024-06-21 NOTE — PROGRESS NOTES
"Subjective      Elis Patterson is a 38 y.o. female who presents for annual well woman exam. Periods are irregular, lasting several days. No intermenstrual bleeding, spotting, or discharge.    Current contraception: Depo-Provera injections  History of abnormal Pap smear: no  Family history of uterine or ovarian cancer: yes - uterine cancer   Family history of breast cancer: no    Menstrual History:  OB History          0    Para        Term                AB   0    Living   0         SAB   0    IAB   0    Ectopic   0    Multiple        Live Births   0                No LMP recorded. Patient has had an injection.       The following portions of the patient's history were reviewed and updated as appropriate: allergies, current medications, past family history, past medical history, past social history, past surgical history, and problem list.    Review of Systems  Review of Systems   Constitutional:  Negative for activity change, appetite change, chills, fatigue and fever.   Respiratory:  Negative for apnea, cough, chest tightness and shortness of breath.    Cardiovascular:  Negative for chest pain, palpitations and leg swelling.   Gastrointestinal:  Negative for abdominal pain, constipation, diarrhea, nausea and vomiting.   Genitourinary:  Negative for difficulty urinating, dysuria, flank pain, frequency, hematuria and urgency.   Neurological:  Negative for dizziness, seizures, syncope, light-headedness, numbness and headaches.   Psychiatric/Behavioral:  Negative for agitation and confusion.           Objective      /76 (BP Location: Left arm, Patient Position: Sitting, Cuff Size: Adult)   Ht 5' 1\" (1.549 m)   Wt 72.8 kg (160 lb 9.6 oz)   BMI 30.35 kg/m²     Physical Exam  OBGyn Exam     General:   alert and oriented, in no acute distress, alert, appears stated age, and cooperative   Heart: regular rate and rhythm, S1, S2 normal, no murmur, click, rub or gallop   Lungs: clear to auscultation " bilaterally   Abdomen: soft, non-tender, without masses or organomegaly   Vulva: normal   Vagina: normal mucosa, normal discharge   Cervix: no cervical motion tenderness and no lesions   Uterus: Mildly enlarged, mildly tender on exam   Adnexa:  Breast Exam:  normal adnexa  breasts appear normal, no suspicious masses, no skin or nipple changes or axillary nodes.                Assessment      @well woman@ .   38-year-old female  Annual exam  Pelvic pain affecting quality of life  Breakthrough bleeding on Depo-Provera resolved  Dysmenorrhea/PMS  Smoker  Adenomyosis/fibroid uterus  Plan   Pap/HPV -2023  History of trichomoniasis repeat culture negative  Diet/exercise  Calcium/vitamin D  Patient still desired to proceed with definitive management as her pelvic pain/dysmenorrhea affecting quality of life we will proceed with robotic total laparoscopic hysterectomy bilateral salpingectomy as discussed with patient at the time of the prior visit postop instruction reviewed and discussed with patient all patient questions answered patient was satisfied     All questions answered.     There are no Patient Instructions on file for this visit.

## 2024-06-26 ENCOUNTER — APPOINTMENT (OUTPATIENT)
Dept: LAB | Facility: HOSPITAL | Age: 39
End: 2024-06-26
Payer: COMMERCIAL

## 2024-06-26 ENCOUNTER — LAB REQUISITION (OUTPATIENT)
Dept: LAB | Facility: HOSPITAL | Age: 39
End: 2024-06-26
Payer: COMMERCIAL

## 2024-06-26 DIAGNOSIS — Z01.818 ENCOUNTER FOR OTHER PREPROCEDURAL EXAMINATION: ICD-10-CM

## 2024-06-26 DIAGNOSIS — Z01.812 ENCOUNTER FOR PRE-OPERATIVE LABORATORY TESTING: ICD-10-CM

## 2024-06-26 DIAGNOSIS — N80.03 ADENOMYOSIS: ICD-10-CM

## 2024-06-26 DIAGNOSIS — D21.9 FIBROIDS: ICD-10-CM

## 2024-06-26 DIAGNOSIS — R10.2 PELVIC PAIN: ICD-10-CM

## 2024-06-26 DIAGNOSIS — Z01.818 OTHER SPECIFIED PRE-OPERATIVE EXAMINATION: ICD-10-CM

## 2024-06-26 PROCEDURE — 36415 COLL VENOUS BLD VENIPUNCTURE: CPT

## 2024-06-26 PROCEDURE — 86901 BLOOD TYPING SEROLOGIC RH(D): CPT | Performed by: OBSTETRICS & GYNECOLOGY

## 2024-06-26 PROCEDURE — 86850 RBC ANTIBODY SCREEN: CPT | Performed by: OBSTETRICS & GYNECOLOGY

## 2024-06-26 PROCEDURE — 86900 BLOOD TYPING SEROLOGIC ABO: CPT | Performed by: OBSTETRICS & GYNECOLOGY

## 2024-06-27 ENCOUNTER — ANESTHESIA EVENT (OUTPATIENT)
Dept: PERIOP | Facility: HOSPITAL | Age: 39
End: 2024-06-27
Payer: COMMERCIAL

## 2024-06-27 NOTE — ANESTHESIA PREPROCEDURE EVALUATION
Procedure:  EXAM UNDER ANESTHESIA; HYSTERECTOMY LAPAROSCOPIC TOTAL (LTH) W/ ROBOTICS AND BILATERAL SALPINGECTOMY. (Abdomen)  CYSTOSCOPY WITH ICG INJECTION (Bladder)    Relevant Problems   GI/HEPATIC   (+) Gastroesophageal reflux disease without esophagitis      NEURO/PSYCH   (+) Numbness and tingling in both hands      PULMONARY   (+) Moderate persistent asthma without complication        Physical Exam    Airway    Mallampati score: II  TM Distance: >3 FB  Neck ROM: full     Dental   No notable dental hx     Cardiovascular  Cardiovascular exam normal    Pulmonary  Pulmonary exam normal     Other Findings  post-pubertal.      Anesthesia Plan  ASA Score- 2     Anesthesia Type- general with ASA Monitors.         Additional Monitors:     Airway Plan: ETT.           Plan Factors-Exercise tolerance (METS): >4 METS.    Chart reviewed. EKG reviewed.  Existing labs reviewed. Patient summary reviewed.    Patient is a current smoker.  Patient did not smoke on day of surgery.            Induction- intravenous.    Postoperative Plan- Plan for postoperative opioid use.         Informed Consent- Anesthetic plan and risks discussed with patient.  I personally reviewed this patient with the CRNA. Discussed and agreed on the Anesthesia Plan with the CRNA..

## 2024-06-28 ENCOUNTER — ANESTHESIA (OUTPATIENT)
Dept: PERIOP | Facility: HOSPITAL | Age: 39
End: 2024-06-28
Payer: COMMERCIAL

## 2024-06-28 ENCOUNTER — HOSPITAL ENCOUNTER (OUTPATIENT)
Facility: HOSPITAL | Age: 39
Setting detail: OUTPATIENT SURGERY
Discharge: HOME | End: 2024-06-28
Attending: OBSTETRICS & GYNECOLOGY | Admitting: OBSTETRICS & GYNECOLOGY
Payer: COMMERCIAL

## 2024-06-28 VITALS
TEMPERATURE: 97.7 F | SYSTOLIC BLOOD PRESSURE: 99 MMHG | HEIGHT: 61 IN | HEART RATE: 75 BPM | OXYGEN SATURATION: 96 % | BODY MASS INDEX: 29.74 KG/M2 | DIASTOLIC BLOOD PRESSURE: 63 MMHG | RESPIRATION RATE: 16 BRPM | WEIGHT: 157.5 LBS

## 2024-06-28 DIAGNOSIS — Z90.710 S/P LAPAROSCOPIC HYSTERECTOMY: Primary | ICD-10-CM

## 2024-06-28 DIAGNOSIS — R10.2 PELVIC PAIN: ICD-10-CM

## 2024-06-28 DIAGNOSIS — N80.03 ADENOMYOSIS: ICD-10-CM

## 2024-06-28 DIAGNOSIS — D21.9 FIBROIDS: ICD-10-CM

## 2024-06-28 LAB
EXT PREGNANCY TEST URINE: NEGATIVE
EXT. CONTROL: NORMAL

## 2024-06-28 PROCEDURE — 88341 IMHCHEM/IMCYTCHM EA ADD ANTB: CPT | Performed by: PATHOLOGY

## 2024-06-28 PROCEDURE — 88360 TUMOR IMMUNOHISTOCHEM/MANUAL: CPT | Performed by: PATHOLOGY

## 2024-06-28 PROCEDURE — S2900 ROBOTIC SURGICAL SYSTEM: HCPCS | Performed by: OBSTETRICS & GYNECOLOGY

## 2024-06-28 PROCEDURE — 58571 TLH W/T/O 250 G OR LESS: CPT | Performed by: OBSTETRICS & GYNECOLOGY

## 2024-06-28 PROCEDURE — 88307 TISSUE EXAM BY PATHOLOGIST: CPT | Performed by: PATHOLOGY

## 2024-06-28 PROCEDURE — C1758 CATHETER, URETERAL: HCPCS | Performed by: OBSTETRICS & GYNECOLOGY

## 2024-06-28 PROCEDURE — NC001 PR NO CHARGE: Performed by: OBSTETRICS & GYNECOLOGY

## 2024-06-28 PROCEDURE — 81025 URINE PREGNANCY TEST: CPT | Performed by: OBSTETRICS & GYNECOLOGY

## 2024-06-28 PROCEDURE — 88342 IMHCHEM/IMCYTCHM 1ST ANTB: CPT | Performed by: PATHOLOGY

## 2024-06-28 PROCEDURE — NC001 PR NO CHARGE: Performed by: PHYSICIAN ASSISTANT

## 2024-06-28 RX ORDER — HYDROMORPHONE HCL/PF 1 MG/ML
SYRINGE (ML) INJECTION AS NEEDED
Status: DISCONTINUED | OUTPATIENT
Start: 2024-06-28 | End: 2024-06-28

## 2024-06-28 RX ORDER — PROPOFOL 10 MG/ML
INJECTION, EMULSION INTRAVENOUS AS NEEDED
Status: DISCONTINUED | OUTPATIENT
Start: 2024-06-28 | End: 2024-06-28

## 2024-06-28 RX ORDER — OXYCODONE HYDROCHLORIDE AND ACETAMINOPHEN 5; 325 MG/1; MG/1
1 TABLET ORAL EVERY 4 HOURS PRN
Qty: 10 TABLET | Refills: 0 | Status: SHIPPED | OUTPATIENT
Start: 2024-06-28 | End: 2024-07-08

## 2024-06-28 RX ORDER — SODIUM CHLORIDE, SODIUM LACTATE, POTASSIUM CHLORIDE, CALCIUM CHLORIDE 600; 310; 30; 20 MG/100ML; MG/100ML; MG/100ML; MG/100ML
INJECTION, SOLUTION INTRAVENOUS CONTINUOUS PRN
Status: DISCONTINUED | OUTPATIENT
Start: 2024-06-28 | End: 2024-06-28

## 2024-06-28 RX ORDER — ACETAMINOPHEN 325 MG/1
975 TABLET ORAL EVERY 6 HOURS PRN
Status: DISCONTINUED | OUTPATIENT
Start: 2024-06-28 | End: 2024-06-28 | Stop reason: HOSPADM

## 2024-06-28 RX ORDER — METRONIDAZOLE 500 MG/1
500 TABLET ORAL EVERY 12 HOURS SCHEDULED
Qty: 14 TABLET | Refills: 0 | Status: SHIPPED | OUTPATIENT
Start: 2024-06-28 | End: 2024-07-05

## 2024-06-28 RX ORDER — IPRATROPIUM BROMIDE AND ALBUTEROL SULFATE 2.5; .5 MG/3ML; MG/3ML
3 SOLUTION RESPIRATORY (INHALATION) ONCE
Status: COMPLETED | OUTPATIENT
Start: 2024-06-28 | End: 2024-06-28

## 2024-06-28 RX ORDER — KETOROLAC TROMETHAMINE 30 MG/ML
INJECTION, SOLUTION INTRAMUSCULAR; INTRAVENOUS AS NEEDED
Status: DISCONTINUED | OUTPATIENT
Start: 2024-06-28 | End: 2024-06-28

## 2024-06-28 RX ORDER — ONDANSETRON 2 MG/ML
4 INJECTION INTRAMUSCULAR; INTRAVENOUS ONCE AS NEEDED
Status: DISCONTINUED | OUTPATIENT
Start: 2024-06-28 | End: 2024-06-28 | Stop reason: HOSPADM

## 2024-06-28 RX ORDER — MAGNESIUM HYDROXIDE 1200 MG/15ML
LIQUID ORAL AS NEEDED
Status: DISCONTINUED | OUTPATIENT
Start: 2024-06-28 | End: 2024-06-28 | Stop reason: HOSPADM

## 2024-06-28 RX ORDER — CEFAZOLIN SODIUM 2 G/50ML
2000 SOLUTION INTRAVENOUS ONCE
Status: COMPLETED | OUTPATIENT
Start: 2024-06-28 | End: 2024-06-28

## 2024-06-28 RX ORDER — OXYCODONE HYDROCHLORIDE 5 MG/1
5 TABLET ORAL EVERY 4 HOURS PRN
Status: DISCONTINUED | OUTPATIENT
Start: 2024-06-28 | End: 2024-06-28 | Stop reason: HOSPADM

## 2024-06-28 RX ORDER — LIDOCAINE HYDROCHLORIDE 10 MG/ML
INJECTION, SOLUTION EPIDURAL; INFILTRATION; INTRACAUDAL; PERINEURAL AS NEEDED
Status: DISCONTINUED | OUTPATIENT
Start: 2024-06-28 | End: 2024-06-28

## 2024-06-28 RX ORDER — ROCURONIUM BROMIDE 10 MG/ML
INJECTION, SOLUTION INTRAVENOUS AS NEEDED
Status: DISCONTINUED | OUTPATIENT
Start: 2024-06-28 | End: 2024-06-28

## 2024-06-28 RX ORDER — HYDROMORPHONE HCL/PF 1 MG/ML
0.5 SYRINGE (ML) INJECTION
Status: DISCONTINUED | OUTPATIENT
Start: 2024-06-28 | End: 2024-06-28 | Stop reason: HOSPADM

## 2024-06-28 RX ORDER — DOCUSATE SODIUM 100 MG/1
100 CAPSULE, LIQUID FILLED ORAL DAILY
Qty: 10 CAPSULE | Refills: 1 | Status: SHIPPED | OUTPATIENT
Start: 2024-06-28

## 2024-06-28 RX ORDER — METOCLOPRAMIDE HYDROCHLORIDE 5 MG/ML
10 INJECTION INTRAMUSCULAR; INTRAVENOUS ONCE AS NEEDED
Status: DISCONTINUED | OUTPATIENT
Start: 2024-06-28 | End: 2024-06-28 | Stop reason: HOSPADM

## 2024-06-28 RX ORDER — FENTANYL CITRATE 50 UG/ML
INJECTION, SOLUTION INTRAMUSCULAR; INTRAVENOUS AS NEEDED
Status: DISCONTINUED | OUTPATIENT
Start: 2024-06-28 | End: 2024-06-28

## 2024-06-28 RX ORDER — MIDAZOLAM HYDROCHLORIDE 2 MG/2ML
INJECTION, SOLUTION INTRAMUSCULAR; INTRAVENOUS AS NEEDED
Status: DISCONTINUED | OUTPATIENT
Start: 2024-06-28 | End: 2024-06-28

## 2024-06-28 RX ORDER — ONDANSETRON 2 MG/ML
INJECTION INTRAMUSCULAR; INTRAVENOUS AS NEEDED
Status: DISCONTINUED | OUTPATIENT
Start: 2024-06-28 | End: 2024-06-28

## 2024-06-28 RX ORDER — IBUPROFEN 600 MG/1
600 TABLET ORAL EVERY 6 HOURS PRN
Status: DISCONTINUED | OUTPATIENT
Start: 2024-06-28 | End: 2024-06-28 | Stop reason: HOSPADM

## 2024-06-28 RX ORDER — DEXAMETHASONE SODIUM PHOSPHATE 10 MG/ML
INJECTION, SOLUTION INTRAMUSCULAR; INTRAVENOUS AS NEEDED
Status: DISCONTINUED | OUTPATIENT
Start: 2024-06-28 | End: 2024-06-28

## 2024-06-28 RX ORDER — IBUPROFEN 600 MG/1
600 TABLET ORAL EVERY 6 HOURS PRN
Qty: 30 TABLET | Refills: 0 | Status: SHIPPED | OUTPATIENT
Start: 2024-06-28

## 2024-06-28 RX ORDER — FENTANYL CITRATE/PF 50 MCG/ML
50 SYRINGE (ML) INJECTION
Status: COMPLETED | OUTPATIENT
Start: 2024-06-28 | End: 2024-06-28

## 2024-06-28 RX ORDER — OXYCODONE HYDROCHLORIDE AND ACETAMINOPHEN 5; 325 MG/1; MG/1
1 TABLET ORAL ONCE
Status: COMPLETED | OUTPATIENT
Start: 2024-06-28 | End: 2024-06-28

## 2024-06-28 RX ADMIN — OXYCODONE HYDROCHLORIDE AND ACETAMINOPHEN 1 TABLET: 5; 325 TABLET ORAL at 11:52

## 2024-06-28 RX ADMIN — SODIUM CHLORIDE, SODIUM LACTATE, POTASSIUM CHLORIDE, AND CALCIUM CHLORIDE: .6; .31; .03; .02 INJECTION, SOLUTION INTRAVENOUS at 07:56

## 2024-06-28 RX ADMIN — PROPOFOL 200 MG: 10 INJECTION, EMULSION INTRAVENOUS at 08:06

## 2024-06-28 RX ADMIN — SODIUM CHLORIDE, SODIUM LACTATE, POTASSIUM CHLORIDE, AND CALCIUM CHLORIDE: .6; .31; .03; .02 INJECTION, SOLUTION INTRAVENOUS at 09:04

## 2024-06-28 RX ADMIN — ROCURONIUM BROMIDE 20 MG: 10 INJECTION, SOLUTION INTRAVENOUS at 09:07

## 2024-06-28 RX ADMIN — DEXAMETHASONE SODIUM PHOSPHATE 10 MG: 10 INJECTION, SOLUTION INTRAMUSCULAR; INTRAVENOUS at 08:07

## 2024-06-28 RX ADMIN — FENTANYL CITRATE 50 MCG: 50 INJECTION INTRAMUSCULAR; INTRAVENOUS at 10:40

## 2024-06-28 RX ADMIN — MIDAZOLAM HYDROCHLORIDE 2 MG: 1 INJECTION, SOLUTION INTRAMUSCULAR; INTRAVENOUS at 07:56

## 2024-06-28 RX ADMIN — SUGAMMADEX 200 MG: 100 INJECTION, SOLUTION INTRAVENOUS at 09:54

## 2024-06-28 RX ADMIN — CEFAZOLIN SODIUM 2000 MG: 2 SOLUTION INTRAVENOUS at 08:19

## 2024-06-28 RX ADMIN — HYDROMORPHONE HYDROCHLORIDE 0.5 MG: 1 INJECTION, SOLUTION INTRAMUSCULAR; INTRAVENOUS; SUBCUTANEOUS at 09:51

## 2024-06-28 RX ADMIN — FENTANYL CITRATE 50 MCG: 50 INJECTION, SOLUTION INTRAMUSCULAR; INTRAVENOUS at 08:06

## 2024-06-28 RX ADMIN — IPRATROPIUM BROMIDE AND ALBUTEROL SULFATE 3 ML: 2.5; .5 SOLUTION RESPIRATORY (INHALATION) at 10:52

## 2024-06-28 RX ADMIN — LIDOCAINE HYDROCHLORIDE 50 MG: 10 INJECTION, SOLUTION EPIDURAL; INFILTRATION; INTRACAUDAL at 08:06

## 2024-06-28 RX ADMIN — ROCURONIUM BROMIDE 50 MG: 10 INJECTION, SOLUTION INTRAVENOUS at 08:07

## 2024-06-28 RX ADMIN — HYDROMORPHONE HYDROCHLORIDE 0.5 MG: 1 INJECTION, SOLUTION INTRAMUSCULAR; INTRAVENOUS; SUBCUTANEOUS at 09:56

## 2024-06-28 RX ADMIN — ONDANSETRON 4 MG: 2 INJECTION INTRAMUSCULAR; INTRAVENOUS at 09:42

## 2024-06-28 RX ADMIN — FENTANYL CITRATE 50 MCG: 50 INJECTION INTRAMUSCULAR; INTRAVENOUS at 10:35

## 2024-06-28 RX ADMIN — KETOROLAC TROMETHAMINE 15 MG: 30 INJECTION INTRAMUSCULAR; INTRAVENOUS at 09:53

## 2024-06-28 RX ADMIN — FENTANYL CITRATE 50 MCG: 50 INJECTION, SOLUTION INTRAMUSCULAR; INTRAVENOUS at 09:30

## 2024-06-28 NOTE — H&P
37-year-old female  Pelvic pain affecting quality of life  Menorrhagia  Dysmenorrhea/PMS  Smoker  Adenomyosis/fibroid uterus  Plan   Refer to GI to check for any GI etiology for abdominal pain  Management option for pelvic pain/adenomyosis/dysmenorrhea/menorrhagia reviewed and discussed with patient patient did not respond to Depo-Provera patient desired to proceed with definitive management as her pain is affecting her quality of life and desire to proceed with hysterectomy we will proceed with robotic with a laparoscopic hysterectomy bilateral salpingectomy procedure explained and discussed with patient all patient questions answered and patient was satisfied     Subjective:       Patient ID: Elis Patterson is a 38 y.o. female.     HPI  38-year-old female present to the office today desire management option for her pelvic pain  . The current episode started 1 to 2 months ago. The problem occurs constantly. The problem has been unchanged. The pain is mild.  Severe at the time of the menses  The problem affects both sides. Associated symptoms include abdominal pain, back pain, constipation, frequency, nausea, urgency and vomiting. Pertinent negatives include no diarrhea or dysuria. Nothing aggravates the symptoms. She has tried nothing for the symptoms. She is not sexually active.   Patient's pain worse at the time of the menses patient also has heavy bleeding at the time of the menses  Patient tried Depo-Provera with no improvement of the pain that is getting worse and affecting quality of life  Patient has ultrasound result reviewed and discussed with patient              The following portions of the patient's history were reviewed and updated as appropriate: allergies, current medications, past family history, past medical history, past social history, past surgical history and problem list.     Review of Systems    Gastrointestinal:  Positive for abdominal pain, constipation, nausea and vomiting. Negative  "for diarrhea.   Genitourinary:  Positive for frequency, pelvic pain and urgency. Negative for dysuria.   Musculoskeletal:  Positive for back pain.   Objective:        /62 (BP Location: Left arm, Patient Position: Sitting, Cuff Size: Adult)   Ht 5' 1\" (1.549 m)   Wt 73.7 kg (162 lb 6.4 oz)   BMI 30.69 kg/m²      UTERUS:  The uterus is anteverted in position, measuring 7.0 x 2.8 x 5.0 cm.  The uterus has a bulbous contour and coarsened heterogeneous echotexture, most consistent with the appearance of diffuse adenomyosis. Right fundal intramural fibroid with a submucosal component (FIGO 2) measuring 1.4 x 1.7 x 1.1 cm, previously 0.9 x 0.9   x 0.9.  Nabothian cyst(s) present, cervix otherwise within normal limits.     ENDOMETRIUM:  The endometrial echo complex has an AP caliber of 5.0 mm.  Appearance within normal limits.     OVARIES/ADNEXA:  Right ovary: 1.9 x 2.2 x 1.5 cm. 3.2 mL.  Ovarian Doppler flow is within normal limits.  No suspicious ovarian or adnexal abnormality.     Left ovary: 2.1 x 1.8 x 1.2 cm. 2.3 mL.  Ovarian Doppler flow is within normal limits.  No suspicious ovarian or adnexal abnormality.     OTHER:  Small amount of simple free fluid in the pelvis, likely physiologic in this premenopausal female.     IMPRESSION:     Diffuse adenomyosis with slightly increased size of a right fundal fibroid with submucosal component.     Ultrasound result reviewed and discussed with patient as patient pain did not respond to Depo-Provera finding of diffuse adenomyosis with fibroid uterus pain is affecting quality of life patient desired to proceed with definitive management patient to see GI as well as she has some GI complaints at the time of the visit aspect of her pelvic pain  Patient desired to proceed with hysterectomy procedure explained and discussed with patient risk of hysterectomy include bleeding, infection, blood transfusion, risk of bowel injury risk of bladder injury and risk of urinary tract " injury risk of anesthesia all explained and discussed with patient risk of needing another surgery as well explained all patient questions answered in details and patient was satisfied      Physical Exam  Vitals and nursing note reviewed.   Constitutional:       Appearance: Normal appearance. She is well-developed.   Neck:      Thyroid: No thyroid mass or thyromegaly.   Cardiovascular:      Rate and Rhythm: Regular rhythm.      Heart sounds: Normal heart sounds.   Pulmonary:      Breath sounds: Normal breath sounds.   Abdominal:      Palpations: Abdomen is soft.      Hernia: No hernia is present.   Genitourinary:     Vagina: Normal. No vaginal discharge, erythema, tenderness or bleeding.      Cervix: No cervical motion tenderness, discharge or friability.      Uterus: Tender. Not enlarged.       Adnexa:         Right: No mass or tenderness.          Left: No mass or tenderness.     Skin:     General: Skin is warm and dry.   Neurological:      Mental Status: She is alert and oriented to person, place, and time.

## 2024-06-28 NOTE — PERIOPERATIVE NURSING NOTE
Pt saturation dropped to 69%. Two RNS at bedside, anesthesia  at bedside. Patient breathing sounded like stridoror wheezing. Lung sounds clear , nasal canula placed on 4 liters of oxygen, duo neb treatment given. Patient saturation returned to 98%. No signs of stridor. Patient returned to baseline.

## 2024-06-28 NOTE — ANESTHESIA POSTPROCEDURE EVALUATION
Post-Op Assessment Note    CV Status:  Stable  Pain Score: 0    Pain management: adequate       Mental Status:  Sleepy and confused   Hydration Status:  Euvolemic   PONV Controlled:  Controlled   Airway Patency:  Patent     Post Op Vitals Reviewed: Yes    No anethesia notable event occurred.    Staff: CRNA               BP  98/45   Temp   97.3   Pulse  84   Resp   16   SpO2   96

## 2024-06-28 NOTE — OP NOTE
OPERATIVE REPORT  PATIENT NAME: Elis Patterson    :  1985  MRN: [id number]  Pt Location: [facility] OR ROOM 02    SURGERY DATE: 2024    Surgeons and Role:     * Brett Everett MD - Primary     * Edie Mcqueen PA-C - Assisting     * Vi Truong MD - Assisting    Preop Diagnosis:  Adenomyosis [N80.03]  Fibroids [D21.9]  Pelvic pain [R10.2]    Post-Op Diagnosis Codes:     * Adenomyosis [N80.03]     * Fibroids [D21.9]     * Pelvic pain [R10.2]    Procedure(s):  EXAM UNDER ANESTHESIA; HYSTERECTOMY LAPAROSCOPIC TOTAL (LTH) W/ ROBOTICS AND BILATERAL SALPINGECTOMY.  CYSTOSCOPY WITH ICG INJECTION    Specimen(s):  ID Type Source Tests Collected by Time Destination   1 : UTERUS, CERVIX, BILATERAL FALLOPIAN TUBES Tissue Soft Tissue, Other TISSUE EXAM Brett Everett MD 2024 0927        Estimated Blood Loss:   Minimal    Drains:  Urethral Catheter Latex 18 Fr. (Active)   Number of days: 0       Anesthesia Type:   General endotracheal tube    Operative Indications:  Adenomyosis [N80.03]  Fibroids [D21.9]  Pelvic pain [R10.2]    Operative Findings:  Normal appearing external female genitalia  Normal appearing cervical surface w/o lesions  Non-gravid anteverted uterus sounding to 7 cm  Normal uterus with two small 1 cm pedunculated fibroids at fundus  Normal bilateral fallopian tubes, ovaries, and liver edge  No adhesions noted   Redundant loops of bowel  Hemostatic surgical sites  Bilateral ureteral jets visualized    Complications:   None apparent    Procedure and Technique:  The patient was taken to the operating room where general endotracheal anesthesia was induced without complications. The patient received antibiotic prophylaxis per hospital protocol.  Sequential compression devices were applied to the lower extremities and activated prior to induction of anesthesia.    The patient was placed in the dorsolithotomy position in Suresh stirrups and her lower abdomen, perineum and vagina were  prepped and draped in the usual sterile fashion. Under direct visualization the uterus was sounded to approximately 10 cm. The endocervix was dilated. A. J LUIS uterine manipulator was secured in place with a stitch of 0 Vicryl.  Corbett catheter was placed and the intravaginal occluder balloon was inflated.     Attention was turned to the abdomen. All port sites were infiltrated with 20 cc of 1% lido with epi  at the  completion of the procedure.   An 5 mm skin incision was made at the Stanford point  Then, using a 5 mm Endopath Excel trocar and the 5 mm laparoscope, the peritoneal cavity was entered under directvisualization. Good intraperitoneal location was confirmed and pneumoperitoneum was created to maximal pressure 15 mm of mercury.   A total of four 8 mm robotic trocars were placed (2 on the left, 1 in the midline replacing and 1 on the right) one of the port replaced the Stanford's point entry.    The patient was placed in steep Trendelenburg and the [email or web address] system was docked. The above-mentioned findings were noted.   Lateral part of the fallopian tube was grasped and bites were taking at the level of the mesosalpinx lateral to medial using bipolar followed by monopolar until the cornual edge following the the utero-ovarian ligament was cauterized and cut bilaterally with good hemostasis.  The round ligaments were cauterized and divided bilaterally and the bladder was mobilized anteriorly. The bladder was mobilized without difficulties.  The ureters were clearly identified transperitoneally.   The uterine vessels were skeletonized cauterized and divided bilaterally.  The cardinal ligaments were serially cauterized and divided and a circumferential colpotomy was made.  The specimen was removed through the vagina.     The vaginal cuff was closed using a running stitch of 2-0 PDO Stratafix.  Airtight closure and excellent hemostasis was obtained.  Copious irrigation was used and excellent hemostasis was confirmed at  low intraperitoneal pressures.    The Corbett was removed.  70 degree cystoscope bladder was retrograde filled with approximately 150 mL of NS. We examined the bladder wall which was noted to be intact. Both ureteral jets were visualized as well as a bubble at the dome of the bladder    At this point the robot was undocked. Pneumoperitoneum was completely released with the assistance of Valsalva maneuvers.  All trocars were removed and the skin was closed using a subcuticular stitch of 4-0 Monocryl and Exofin was applied to all incisions.  The patient tolerated the procedure well. Sponge, lap, needle and instrument counts were reported as correct x2.  The patient was successfully extubated in the operating room and transferred to the post anesthetic care unit in stable condition.     Dr. Everett was present for the entire procedure.    Patient Disposition:  PACU     SIGNATURE: Vi Truong MD  DATE: June 28, 2024  TIME: 9:55 AM

## 2024-07-05 ENCOUNTER — TELEPHONE (OUTPATIENT)
Age: 39
End: 2024-07-05

## 2024-07-05 NOTE — TELEPHONE ENCOUNTER
Covering for Augustina LOZANO - Patient called to schedule their post-op appt from 6/28 surgery with Dr Everett. Augustina is OOO until 7/9. Attempted warm transfer. Please reach out to pt to schedule

## 2024-07-10 PROCEDURE — 88360 TUMOR IMMUNOHISTOCHEM/MANUAL: CPT | Performed by: PATHOLOGY

## 2024-07-10 PROCEDURE — 88341 IMHCHEM/IMCYTCHM EA ADD ANTB: CPT | Performed by: PATHOLOGY

## 2024-07-10 PROCEDURE — 88307 TISSUE EXAM BY PATHOLOGIST: CPT | Performed by: PATHOLOGY

## 2024-07-10 PROCEDURE — 88342 IMHCHEM/IMCYTCHM 1ST ANTB: CPT | Performed by: PATHOLOGY

## 2024-07-11 ENCOUNTER — OFFICE VISIT (OUTPATIENT)
Dept: OBGYN CLINIC | Facility: CLINIC | Age: 39
End: 2024-07-11

## 2024-07-11 VITALS
DIASTOLIC BLOOD PRESSURE: 64 MMHG | SYSTOLIC BLOOD PRESSURE: 106 MMHG | HEIGHT: 61 IN | WEIGHT: 158 LBS | BODY MASS INDEX: 29.83 KG/M2 | TEMPERATURE: 98.9 F

## 2024-07-11 DIAGNOSIS — Z90.710 STATUS POST HYSTERECTOMY: Primary | ICD-10-CM

## 2024-07-11 PROCEDURE — 99024 POSTOP FOLLOW-UP VISIT: CPT | Performed by: OBSTETRICS & GYNECOLOGY

## 2024-07-11 NOTE — PROGRESS NOTES
"Assessment/Plan:     There are no diagnoses linked to this encounter.         38-year-old female  Pelvic pain affecting quality of life  Breakthrough bleeding on Depo-Provera resolved  Dysmenorrhea/PMS  Smoker  Adenomyosis/fibroid uterus  Status post robotic total laparoscopic hysterectomy bilateral salpingectomy  Plan   Return to office in 4 weeks for postop visit  Pap/HPV -2023  History of trichomoniasis repeat culture negative  Diet/exercise  Calcium/vitamin D          \      Subjective:      Patient ID: Elis Patterson is a 38 y.o. female.    HPI  38-year-old female present to the office today for postop visit denies any complaint  Pain is improving  Denies any urgency frequency or dysuria  Denies any diarrhea or constipation  Patient is passing flatus and bowel movement without any difficulty  Denies any vaginal bleeding  Pathology result reviewed and discussed with patient      A. Uterus, UTERUS, CERVIX, BILATERAL FALLOPIAN TUBES:  - Benign weakly proliferative endometrium  - Myometrium with leiomyomas  - Subserosal leiomyoma, about 3 mm (0.3 cm) (slide A7). See note  - Benign bilateral fallopian tubes with unremarkable fimbriated ends         The following portions of the patient's history were reviewed and updated as appropriate: allergies, current medications, past family history, past medical history, past social history, past surgical history and problem list.    Review of Systems      Objective:      /64 (BP Location: Left arm, Patient Position: Sitting, Cuff Size: Adult)   Temp 98.9 °F (37.2 °C)   Ht 5' 1\" (1.549 m)   Wt 71.7 kg (158 lb)   BMI 29.85 kg/m²          Physical Exam  Constitutional:       Appearance: Normal appearance.   Abdominal:      General: Abdomen is flat. Bowel sounds are normal.      Palpations: Abdomen is soft.      Comments: Incision clean/dry/intact   Neurological:      General: No focal deficit present.      Mental Status: She is alert and oriented to person, place, " and time.   Psychiatric:         Mood and Affect: Mood normal.         Behavior: Behavior normal.

## 2024-07-24 ENCOUNTER — OFFICE VISIT (OUTPATIENT)
Dept: FAMILY MEDICINE CLINIC | Facility: CLINIC | Age: 39
End: 2024-07-24
Payer: COMMERCIAL

## 2024-07-24 VITALS
HEART RATE: 112 BPM | HEIGHT: 61 IN | TEMPERATURE: 98 F | OXYGEN SATURATION: 99 % | DIASTOLIC BLOOD PRESSURE: 78 MMHG | BODY MASS INDEX: 30.21 KG/M2 | WEIGHT: 160 LBS | SYSTOLIC BLOOD PRESSURE: 118 MMHG | RESPIRATION RATE: 16 BRPM

## 2024-07-24 DIAGNOSIS — M25.562 CHRONIC PAIN OF BOTH KNEES: ICD-10-CM

## 2024-07-24 DIAGNOSIS — J45.40 MODERATE PERSISTENT ASTHMA WITHOUT COMPLICATION: Primary | ICD-10-CM

## 2024-07-24 DIAGNOSIS — M25.522 LEFT ELBOW PAIN: ICD-10-CM

## 2024-07-24 DIAGNOSIS — J45.40 MODERATE PERSISTENT ASTHMA WITHOUT COMPLICATION: ICD-10-CM

## 2024-07-24 DIAGNOSIS — F32.1 CURRENT MODERATE EPISODE OF MAJOR DEPRESSIVE DISORDER WITHOUT PRIOR EPISODE (HCC): Primary | ICD-10-CM

## 2024-07-24 DIAGNOSIS — M25.561 CHRONIC PAIN OF BOTH KNEES: ICD-10-CM

## 2024-07-24 DIAGNOSIS — G89.29 CHRONIC PAIN OF BOTH KNEES: ICD-10-CM

## 2024-07-24 PROCEDURE — 99214 OFFICE O/P EST MOD 30 MIN: CPT | Performed by: FAMILY MEDICINE

## 2024-07-24 RX ORDER — ESCITALOPRAM OXALATE 5 MG/1
5 TABLET ORAL DAILY
Qty: 30 TABLET | Refills: 5 | Status: SHIPPED | OUTPATIENT
Start: 2024-07-24

## 2024-07-24 RX ORDER — FLUTICASONE PROPIONATE AND SALMETEROL 250; 50 UG/1; UG/1
1 POWDER RESPIRATORY (INHALATION) 2 TIMES DAILY
Qty: 60 BLISTER | Refills: 6 | Status: SHIPPED | OUTPATIENT
Start: 2024-07-24

## 2024-07-24 NOTE — PROGRESS NOTES
Subjective: pt has had depression  for years feels it is worsening has anxiety as well  not suicidal  pt with chronic knee pain right worse than left radiating down shins      Patient ID: Elis Patterson is a 38 y.o. female.    Arm Pain         Past Medical History:   Diagnosis Date   • Adenomyosis    • Anxiety    • Asthma    • Depression    • Dysmenorrhea    • Fibroid    • GERD (gastroesophageal reflux disease)    • Migraine        Family History   Problem Relation Age of Onset   • Heart disease Mother    • Hyperlipidemia Mother    • Hypertension Mother    • Diabetes Mother    • Asthma Mother    • Depression Mother    • Anxiety disorder Mother    • No Known Problems Father    • Cancer Other         Pt states dads side has cancer unsure what type of cancer.       Past Surgical History:   Procedure Laterality Date   • CYSTOSCOPY N/A 06/28/2024    Procedure: CYSTOSCOPY;  Surgeon: Brett Everett MD;  Location:  MAIN OR;  Service: Gynecology   • HYSTERECTOMY  06/28/24   • ID LAPS TOTAL HYSTERECT 250 GM/< W/RMVL TUBE/OVARY N/A 06/28/2024    Procedure: EXAM UNDER ANESTHESIA; HYSTERECTOMY LAPAROSCOPIC TOTAL (LTH) W/ ROBOTICS AND BILATERAL SALPINGECTOMY.;  Surgeon: Brett Everett MD;  Location:  MAIN OR;  Service: Gynecology        reports that she has been smoking cigarettes. She started smoking about 16 years ago. She has a 4.1 pack-year smoking history. She has never used smokeless tobacco. She reports that she does not currently use alcohol. She reports that she does not use drugs.      Current Outpatient Medications:   •  acetaminophen (TYLENOL) 500 mg tablet, Take 500 mg by mouth every 6 (six) hours as needed for mild pain, Disp: , Rfl:   •  albuterol (PROVENTIL HFA,VENTOLIN HFA) 90 mcg/act inhaler, Inhale 2 puffs every 4 (four) hours, Disp: 18 g, Rfl: 1  •  docusate sodium (COLACE) 100 mg capsule, Take 1 capsule (100 mg total) by mouth daily, Disp: 10 capsule, Rfl: 1  •  escitalopram (LEXAPRO) 5 mg tablet,  Take 1 tablet (5 mg total) by mouth daily, Disp: 30 tablet, Rfl: 5  •  esomeprazole (NexIUM) 40 MG capsule, TAKE 1 CAPSULE (40 MG TOTAL) BY MOUTH EVERY MORNING., Disp: 90 capsule, Rfl: 1  •  Fluticasone-Salmeterol (Advair Diskus) 250-50 mcg/dose inhaler, Inhale 1 puff 2 (two) times a day Rinse mouth after use., Disp: 60 blister, Rfl: 6  •  ibuprofen (MOTRIN) 200 mg tablet, Take by mouth every 6 (six) hours as needed for mild pain, Disp: , Rfl:     The following portions of the patient's history were reviewed and updated as appropriate: allergies, current medications, past family history, past medical history, past social history, past surgical history and problem list.    Review of Systems   Musculoskeletal:  Positive for arthralgias (both knees radiating down shin).        Pain left elbow crease now better    Psychiatric/Behavioral:  Positive for dysphoric mood. Negative for self-injury and suicidal ideas. The patient is nervous/anxious.          PHQ-2/9 Depression Screening    Little interest or pleasure in doing things: 3 - nearly every day  Feeling down, depressed, or hopeless: 3 - nearly every day  Trouble falling or staying asleep, or sleeping too much: 3 - nearly every day  Feeling tired or having little energy: 3 - nearly every day  Poor appetite or overeatin - not at all  Feeling bad about yourself - or that you are a failure or have let yourself or your family down: 2 - more than half the days  Trouble concentrating on things, such as reading the newspaper or watching television: 1 - several days  Moving or speaking so slowly that other people could have noticed. Or the opposite - being so fidgety or restless that you have been moving around a lot more than usual: 0 - not at all  Thoughts that you would be better off dead, or of hurting yourself in some way: 0 - not at all  PHQ-2 Score: 6  PHQ-2 Interpretation: POSITIVE depression screen  PHQ-9 Score: 15  PHQ-9 Interpretation: Moderately severe  "depression             Objective:    /78 (BP Location: Left arm, Patient Position: Sitting, Cuff Size: Standard)   Pulse (!) 112   Temp 98 °F (36.7 °C) (Tympanic)   Resp 16   Ht 5' 1\" (1.549 m)   Wt 72.6 kg (160 lb)   SpO2 99%   BMI 30.23 kg/m²      Physical Exam  Constitutional:       General: She is not in acute distress.     Appearance: Normal appearance. She is well-developed. She is not ill-appearing.   Eyes:      Extraocular Movements: Extraocular movements intact.   Neck:      Thyroid: No thyromegaly.   Cardiovascular:      Rate and Rhythm: Normal rate.   Pulmonary:      Effort: Pulmonary effort is normal. No respiratory distress.      Breath sounds: Normal breath sounds.   Musculoskeletal:      Cervical back: Normal range of motion.      Comments: Antecubital fossa nl   Neurological:      General: No focal deficit present.      Mental Status: She is alert and oriented to person, place, and time. Mental status is at baseline.   Psychiatric:         Mood and Affect: Mood normal.         Behavior: Behavior normal.           Recent Results (from the past 8736 hour(s))   Chlamydia/GC amplified DNA by PCR    Collection Time: 03/14/24 10:12 AM    Specimen: Cervix; Genital   Result Value Ref Range    N gonorrhoeae, DNA Probe Negative Negative    Chlamydia trachomatis, DNA Probe Negative Negative   VAGINOSIS DNA PROBE (AFFIRM)    Collection Time: 03/14/24 10:12 AM    Specimen: Vaginal; Genital   Result Value Ref Range    Trichomonas vaginalis Not Detected Not Detected    Gardnerella vaginalis Detected (A) Not Detected    Candida Species Not Detected Not Detected   Urine culture    Collection Time: 03/15/24  6:16 AM    Specimen: Urine, Clean Catch   Result Value Ref Range    Urine Culture 70,000-79,000 cfu/ml    CBC and differential    Collection Time: 04/12/24  7:00 AM   Result Value Ref Range    WBC 13.52 (H) 4.31 - 10.16 Thousand/uL    RBC 4.72 3.81 - 5.12 Million/uL    Hemoglobin 14.5 11.5 - 15.4 g/dL "    Hematocrit 42.7 34.8 - 46.1 %    MCV 91 82 - 98 fL    MCH 30.7 26.8 - 34.3 pg    MCHC 34.0 31.4 - 37.4 g/dL    RDW 12.5 11.6 - 15.1 %    MPV 10.0 8.9 - 12.7 fL    Platelets 264 149 - 390 Thousands/uL    nRBC 0 /100 WBCs    Segmented % 74 43 - 75 %    Immature Grans % 1 0 - 2 %    Lymphocytes % 18 14 - 44 %    Monocytes % 5 4 - 12 %    Eosinophils Relative 1 0 - 6 %    Basophils Relative 1 0 - 1 %    Absolute Neutrophils 10.06 (H) 1.85 - 7.62 Thousands/µL    Absolute Immature Grans 0.09 0.00 - 0.20 Thousand/uL    Absolute Lymphocytes 2.40 0.60 - 4.47 Thousands/µL    Absolute Monocytes 0.66 0.17 - 1.22 Thousand/µL    Eosinophils Absolute 0.17 0.00 - 0.61 Thousand/µL    Basophils Absolute 0.14 (H) 0.00 - 0.10 Thousands/µL   HEMOGLOBIN A1C W/ EAG ESTIMATION    Collection Time: 04/12/24  7:00 AM   Result Value Ref Range    Hemoglobin A1C 5.4 Normal 4.0-5.6%; PreDiabetic 5.7-6.4%; Diabetic >=6.5%; Glycemic control for adults with diabetes <7.0% %     mg/dl   Type and screen    Collection Time: 04/12/24  7:00 AM   Result Value Ref Range    ABO Grouping O     Rh Factor Positive     Antibody Screen Negative     Specimen Expiration Date [id number]    CBC and differential    Collection Time: 05/10/24  7:35 AM   Result Value Ref Range    WBC 8.23 4.31 - 10.16 Thousand/uL    RBC 4.84 3.81 - 5.12 Million/uL    Hemoglobin 14.7 11.5 - 15.4 g/dL    Hematocrit 44.3 34.8 - 46.1 %    MCV 92 82 - 98 fL    MCH 30.4 26.8 - 34.3 pg    MCHC 33.2 31.4 - 37.4 g/dL    RDW 12.7 11.6 - 15.1 %    MPV 10.4 8.9 - 12.7 fL    Platelets 233 149 - 390 Thousands/uL    nRBC 0 /100 WBCs    Segmented % 64 43 - 75 %    Immature Grans % 2 0 - 2 %    Lymphocytes % 26 14 - 44 %    Monocytes % 5 4 - 12 %    Eosinophils Relative 2 0 - 6 %    Basophils Relative 1 0 - 1 %    Absolute Neutrophils 5.33 1.85 - 7.62 Thousands/µL    Absolute Immature Grans 0.14 0.00 - 0.20 Thousand/uL    Absolute Lymphocytes 2.14 0.60 - 4.47 Thousands/µL    Absolute Monocytes  0.41 0.17 - 1.22 Thousand/µL    Eosinophils Absolute 0.14 0.00 - 0.61 Thousand/µL    Basophils Absolute 0.07 0.00 - 0.10 Thousands/µL   Hemoglobin A1C    Collection Time: 05/10/24  7:35 AM   Result Value Ref Range    Hemoglobin A1C 5.5 Normal 4.0-5.6%; PreDiabetic 5.7-6.4%; Diabetic >=6.5%; Glycemic control for adults with diabetes <7.0% %     mg/dl   Type and screen    Collection Time: 05/10/24  7:35 AM   Result Value Ref Range    ABO Grouping O     Rh Factor Positive     Antibody Screen Negative     Specimen Expiration Date [id number]    Comprehensive metabolic panel    Collection Time: 06/10/24  7:25 AM   Result Value Ref Range    Sodium 139 135 - 147 mmol/L    Potassium 3.9 3.5 - 5.3 mmol/L    Chloride 106 96 - 108 mmol/L    CO2 25 21 - 32 mmol/L    ANION GAP 8 4 - 13 mmol/L    BUN 14 5 - 25 mg/dL    Creatinine 0.78 0.60 - 1.30 mg/dL    Glucose, Fasting 102 (H) 65 - 99 mg/dL    Calcium 8.9 8.4 - 10.2 mg/dL    AST 13 13 - 39 U/L    ALT 13 7 - 52 U/L    Alkaline Phosphatase 67 34 - 104 U/L    Total Protein 6.8 6.4 - 8.4 g/dL    Albumin 4.2 3.5 - 5.0 g/dL    Total Bilirubin 0.75 0.20 - 1.00 mg/dL    eGFR 98 ml/min/1.73sq m   Lipid panel    Collection Time: 06/10/24  7:25 AM   Result Value Ref Range    Cholesterol 196 See Comment mg/dL    Triglycerides 145 See Comment mg/dL    HDL, Direct 37 (L) >=50 mg/dL    LDL Calculated 130 (H) 0 - 100 mg/dL    Non-HDL-Chol (CHOL-HDL) 159 mg/dl   Type and screen    Collection Time: 06/26/24  2:28 PM   Result Value Ref Range    ABO Grouping O     Rh Factor Positive     Antibody Screen Negative     Specimen Expiration Date 20240724    Urine pregnancy test-Holding area only    Collection Time: 06/28/24  7:36 AM   Result Value Ref Range    EXT Preg Test, Ur Negative Negative    Control Valid Valid   Tissue Exam    Collection Time: 06/28/24  9:27 AM   Result Value Ref Range    Case Report       Surgical Pathology Report                         Case: [id number]                                   Authorizing Provider:  Brett Everett MD          Collected:           06/28/2024 0927              Ordering Location:     [facility]   Received:            06/28/2024 9663                                     Operating Room                                                               Pathologist:           Anne Harris MD                                                             Specimen:    Uterus, UTERUS, CERVIX, BILATERAL FALLOPIAN TUBES                                          Final Diagnosis       A. Uterus, UTERUS, CERVIX, BILATERAL FALLOPIAN TUBES:  - Benign weakly proliferative endometrium  - Myometrium with leiomyomas  - Subserosal leiomyoma, about 3 mm (0.3 cm) (slide A7). See note  - Benign bilateral fallopian tubes with unremarkable fimbriated ends    NOTE A: Immunostains with antibodies for AE1/AE3, CAM5.2, Ki-67, SMA, desmin, and CD10 were performed on block A7.  The immunostains confirm the diagnosis of subserosal leiomyoma on slide A7.  Ki-67 is less than 5%.     Intradepartmental consultation was obtained.             Additional Information       All reported additional testing was performed with appropriately reactive controls.  These tests were developed and their performance characteristics determined by [facility] or appropriate performing facility, though some tests may be performed on tissues which have not been validated for performance characteristics (such as staining performed on alcohol exposed cell blocks and decalcified tissues).  Results should be interpreted with caution and in the context of the patients’ clinical condition. These tests may not be cleared or approved by the U.S. Food and Drug Administration, though the FDA has determined that such clearance or approval is not necessary. These tests are used for clinical purposes and they should not be regarded as investigational or for research. This laboratory has been approved by  "CLIA 88, designated as a high-complexity laboratory and is qualified to perform these tests.    Interpretation performed at [facility], [address] [facility] [address]    .      Gross Description       A. The specimen is received in formalin, labeled with the patient's name and hospital number, and is designated \" uterus, cervix, bilateral fallopian tubes\".  The specimen consists of a uterus with cervix, attached left fallopian tube, right fallopian tube in container and without bilateral ovaries measuring 7.6 cm from the top of the fundus to the cervical resection margin, 3.0 cm from cornu to cornu, and 3.4 cm from anterior to posterior with a round os measuring 0.7 cm in greatest dimension.   The left adnexa is removed and the uterus weighs 50 g. The serosa exhibits focal areas of adhesions and 2 subserosal nodules measuring 0.7 and 0.8 cm in greatest dimension.  Upon cut section these nodules exhibit a tan-white, homogeneous, whorled bulging cut surface with no grossly identifiable areas of hemorrhage or necrosis.  The ectocervix is tan-purple, smooth and glistening.  The cervix exhibits multiple gelatinous cysts.  The endometrial cavity is triangular in shape measuring 3.5 x 1.5 cm.  The endometrium measures up to 0.3 cm in thickness.  The myometrium appears trabeculated and shows 1 well-circumscribed intramural nodule measuring 1.2 cm in greatest dimension.  Upon cut section this nodule exhibits a tan-white, homogeneous, whorled bulging cut surface with no grossly identifiable areas of hemorrhage or necrosis.    The attached left fallopian tube with attached fimbria measures 5.0 cm in length and up to 0.3 cm in diameter.  The lumen contains a tan pinpoint cut surface.  The right fallopian tube with scantly attached fimbria in container measures 4.4 cm in length and up to 0.5 cm in diameter.  The lumen contains a tan pinpoint cut surface.  Representative sections. Eleven cassettes.    1: Anterior " cervix  2: Posterior cervix  3-4: Anterior endomyometrium, full-thickness sections with nodule  5-6: Posterior endomyometrium, full-thickness sections  7: 2 nodules  8-9: Left fallopian tube with entire fimbria  10-11: Right fallopian tube with entire fimbria    At the request of Dr. Harris additional sections of anterior and posterior endomyometrium are submitted in 4 cassettes as follows:  12-13: Anterior endomyometrium with 2 sections in cassette 12  14-15: Posterior endomyometrium with 2 sections in cassette 14  Dianne        Note: The estimated total formalin fixation time based upon information provided by the submitting clinician and the standard processing schedule is over 72 hours. Dianne            Clinical Information       Preop Diagnosis:  Adenomyosis [N80.03]  Fibroids [D21.9]  Pelvic pain [R10.2]     Post-Op Diagnosis Codes:     * Adenomyosis [N80.03]     * Fibroids [D21.9]     * Pelvic pain [R10.2]     Procedure(s):  EXAM UNDER ANESTHESIA; HYSTERECTOMY LAPAROSCOPIC TOTAL (LTH) W/ ROBOTICS AND BILATERAL SALPINGECTOMY.  CYSTOSCOPY WITH ICG INJECTION         Laboratory Results: I have personally reviewed the pertinent laboratory results/reports     Radiology/Other Diagnostic Testing Results: I have personally reviewed pertinent reports.      No results found.     Assessment/Plan:  1. Current moderate episode of major depressive disorder without prior episode (HCC)  Assessment & Plan:  Will need medication  start lexapro  5mg po qd  daily follow up 1 mo side effects explained   Orders:  -     escitalopram (LEXAPRO) 5 mg tablet; Take 1 tablet (5 mg total) by mouth daily  2. Chronic pain of both knees  Assessment & Plan:    Xray knees chronic pain radiating down shins   Orders:  -     XR knee 3 vw left non injury; Future; Expected date: 07/24/2024  -     XR knee 3 vw right non injury; Future; Expected date: 07/24/2024  3. Moderate persistent asthma without complication  Assessment & Plan:  Pt needs  "maintenance inhaler advair 250/50 1 puff bid   Orders:  -     Fluticasone-Salmeterol (Advair Diskus) 250-50 mcg/dose inhaler; Inhale 1 puff 2 (two) times a day Rinse mouth after use.  4. Left elbow pain  Assessment & Plan:  Had pain left elbow crease now resolved                    Read package inserts for all medications before starting a new medications, call me if you have any questions.    Patient was given opportunity to ask questions and all questions were answered.    Disclaimer: Portions of the record may have been created with voice recognition software. Occasional wrong word or \"sound a like\" substitutions may have occurred due to the inherent limitations of voice recognition software. Read the chart carefully and recognize, using context, where substitutions have occurred. I have used the Epic copy/forward function to compose this note. I have reviewed my current note to ensure it reflects the current patient status, exam, assessment and plan.  "

## 2024-07-24 NOTE — TELEPHONE ENCOUNTER
Patient called and states Fluticasone-Salmeterol is out of stock and pharmacy is unsure when getting in. Would like to know if there is alternative medication or do you want her to wait until this medication is in stock. Please call patient. [facility] on [address]

## 2024-07-25 RX ORDER — BUDESONIDE AND FORMOTEROL FUMARATE DIHYDRATE 160; 4.5 UG/1; UG/1
2 AEROSOL RESPIRATORY (INHALATION) 2 TIMES DAILY
Qty: 10.2 G | Refills: 1 | Status: SHIPPED | OUTPATIENT
Start: 2024-07-25

## 2024-08-02 ENCOUNTER — OFFICE VISIT (OUTPATIENT)
Dept: OBGYN CLINIC | Facility: CLINIC | Age: 39
End: 2024-08-02

## 2024-08-02 VITALS
WEIGHT: 161 LBS | SYSTOLIC BLOOD PRESSURE: 100 MMHG | TEMPERATURE: 97.8 F | DIASTOLIC BLOOD PRESSURE: 60 MMHG | BODY MASS INDEX: 30.4 KG/M2 | HEIGHT: 61 IN

## 2024-08-02 DIAGNOSIS — Z90.710 STATUS POST HYSTERECTOMY: Primary | ICD-10-CM

## 2024-08-02 PROCEDURE — 99024 POSTOP FOLLOW-UP VISIT: CPT | Performed by: OBSTETRICS & GYNECOLOGY

## 2024-08-02 NOTE — PROGRESS NOTES
"Assessment/Plan:     Diagnoses and all orders for this visit:    Status post hysterectomy          Subjective:      Patient ID: Elis Patterson is a 38 y.o. female.    HPI  Patient seen evaluated present to the office today status post hysterectomy denies any complaint  Denies any vaginal bleeding  Denies any nausea vomiting  Denies any urgency frequency or dysuria  Denies any pelvic pain  Passing flatus and bowel movement without any difficulty  The following portions of the patient's history were reviewed and updated as appropriate: allergies, current medications, past family history, past medical history, past social history, past surgical history and problem list.    Review of Systems      Objective:      /60 (BP Location: Left arm, Patient Position: Sitting, Cuff Size: Adult)   Temp 97.8 °F (36.6 °C)   Ht 5' 1\" (1.549 m)   Wt 73 kg (161 lb)   BMI 30.42 kg/m²          Physical Exam  Constitutional:       Appearance: Normal appearance.   Abdominal:      General: Abdomen is flat.      Palpations: Abdomen is soft.      Comments: Incision clean/dry/intact   Neurological:      General: No focal deficit present.      Mental Status: She is alert and oriented to person, place, and time.   Psychiatric:         Mood and Affect: Mood normal.         Behavior: Behavior normal.         "

## 2024-08-15 DIAGNOSIS — F32.1 CURRENT MODERATE EPISODE OF MAJOR DEPRESSIVE DISORDER WITHOUT PRIOR EPISODE (HCC): ICD-10-CM

## 2024-08-16 RX ORDER — ESCITALOPRAM OXALATE 5 MG/1
5 TABLET ORAL DAILY
Qty: 90 TABLET | Refills: 1 | Status: SHIPPED | OUTPATIENT
Start: 2024-08-16 | End: 2024-08-22

## 2024-08-20 ENCOUNTER — OFFICE VISIT (OUTPATIENT)
Dept: OBGYN CLINIC | Facility: CLINIC | Age: 39
End: 2024-08-20

## 2024-08-20 VITALS
HEIGHT: 61 IN | DIASTOLIC BLOOD PRESSURE: 72 MMHG | BODY MASS INDEX: 30.78 KG/M2 | WEIGHT: 163 LBS | SYSTOLIC BLOOD PRESSURE: 112 MMHG

## 2024-08-20 DIAGNOSIS — Z90.710 STATUS POST HYSTERECTOMY: Primary | ICD-10-CM

## 2024-08-20 PROCEDURE — 99024 POSTOP FOLLOW-UP VISIT: CPT | Performed by: OBSTETRICS & GYNECOLOGY

## 2024-08-20 NOTE — PROGRESS NOTES
"Assessment/Plan:     There are no diagnoses linked to this encounter.      38-year-old female  Pelvic pain affecting quality of life  Breakthrough bleeding on Depo-Provera resolved  Dysmenorrhea/PMS  Smoker  Adenomyosis/fibroid uterus  Status post robotic total laparoscopic hysterectomy bilateral salpingectomy  Plan   Pap/HPV -2023  History of trichomoniasis repeat culture negative  Diet/exercise  Calcium/vitamin D  Return to office for annual exam    Subjective:      Patient ID: Elis Patterson is a 38 y.o. female.    HPI  Patient seen evaluated present to the office today status post hysterectomy denies any complaint  Denies any vaginal bleeding  Denies any urgency frequency or dysuria  Denies any pelvic pain  The following portions of the patient's history were reviewed and updated as appropriate: allergies, current medications, past family history, past medical history, past social history, past surgical history and problem list.    Review of Systems      Objective:      /72 (BP Location: Left arm, Patient Position: Sitting, Cuff Size: Adult)   Ht 5' 1\" (1.549 m)   Wt 73.9 kg (163 lb)   LMP 09/26/2023   BMI 30.80 kg/m²          Physical Exam  Constitutional:       Appearance: She is well-developed.   Abdominal:      General: There is no distension.      Palpations: Abdomen is soft.      Tenderness: There is no abdominal tenderness.   Genitourinary:     Labia:         Right: No rash, tenderness or lesion.         Left: No rash, tenderness or lesion.       Vagina: No signs of injury. No vaginal discharge, erythema or tenderness.      Adnexa:         Right: No mass, tenderness or fullness.          Left: No mass, tenderness or fullness.        Comments: Vaginal cuff is healing well to continue pelvic rest for 2 more weeks  Neurological:      Mental Status: She is alert and oriented to person, place, and time.   Psychiatric:         Behavior: Behavior normal.         "

## 2024-08-22 ENCOUNTER — OFFICE VISIT (OUTPATIENT)
Dept: FAMILY MEDICINE CLINIC | Facility: CLINIC | Age: 39
End: 2024-08-22
Payer: COMMERCIAL

## 2024-08-22 VITALS
HEIGHT: 61 IN | HEART RATE: 91 BPM | OXYGEN SATURATION: 97 % | WEIGHT: 164 LBS | DIASTOLIC BLOOD PRESSURE: 72 MMHG | RESPIRATION RATE: 16 BRPM | SYSTOLIC BLOOD PRESSURE: 120 MMHG | TEMPERATURE: 98 F | BODY MASS INDEX: 30.96 KG/M2

## 2024-08-22 DIAGNOSIS — F32.1 CURRENT MODERATE EPISODE OF MAJOR DEPRESSIVE DISORDER WITHOUT PRIOR EPISODE (HCC): Primary | ICD-10-CM

## 2024-08-22 PROCEDURE — 99213 OFFICE O/P EST LOW 20 MIN: CPT | Performed by: FAMILY MEDICINE

## 2024-08-22 RX ORDER — ESCITALOPRAM OXALATE 10 MG/1
10 TABLET ORAL DAILY
Qty: 30 TABLET | Refills: 6 | Status: SHIPPED | OUTPATIENT
Start: 2024-08-22

## 2024-08-22 NOTE — PROGRESS NOTES
"Ambulatory Visit  Name: Elis Patterson      : 1985      MRN: [id number]  Encounter Provider: Korin Caballero MD  Encounter Date: 2024   Encounter department: [facility]    Assessment & Plan   1. Current moderate episode of major depressive disorder without prior episode (HCC)  Assessment & Plan:  Still no significant improvement not suicidal  will increase to 10mg po qd follow  up 1 mo  Orders:  -     escitalopram (LEXAPRO) 10 mg tablet; Take 1 tablet (10 mg total) by mouth daily       History of Present Illness     Patient here for reevaluation of depression was started on Lexapro 5 mg daily daily. Patient tolerated medication but feels no significant improvement in her depression. Patient is not suicidal and is willing to increase the dose.        Review of Systems   Psychiatric/Behavioral:  Positive for dysphoric mood. Negative for self-injury, sleep disturbance and suicidal ideas.        Objective     /72 (BP Location: Left arm, Patient Position: Sitting, Cuff Size: Standard)   Pulse 91   Temp 98 °F (36.7 °C) (Tympanic)   Resp 16   Ht 5' 1\" (1.549 m)   Wt 74.4 kg (164 lb)   LMP 2023   SpO2 97%   BMI 30.99 kg/m²     Physical Exam  Constitutional:       Appearance: Normal appearance. She is well-developed.   HENT:      Head: Normocephalic.   Pulmonary:      Effort: Pulmonary effort is normal. No respiratory distress.      Breath sounds: Normal breath sounds.   Neurological:      General: No focal deficit present.      Mental Status: She is alert and oriented to person, place, and time. Mental status is at baseline.   Psychiatric:         Behavior: Behavior normal.         Thought Content: Thought content normal.         Judgment: Judgment normal.       Administrative Statements           "

## 2024-09-14 DIAGNOSIS — F32.1 CURRENT MODERATE EPISODE OF MAJOR DEPRESSIVE DISORDER WITHOUT PRIOR EPISODE (HCC): ICD-10-CM

## 2024-09-15 RX ORDER — ESCITALOPRAM OXALATE 10 MG/1
10 TABLET ORAL DAILY
Qty: 90 TABLET | Refills: 1 | Status: SHIPPED | OUTPATIENT
Start: 2024-09-15

## 2024-09-23 ENCOUNTER — OFFICE VISIT (OUTPATIENT)
Dept: FAMILY MEDICINE CLINIC | Facility: CLINIC | Age: 39
End: 2024-09-23
Payer: COMMERCIAL

## 2024-09-23 VITALS
HEIGHT: 61 IN | RESPIRATION RATE: 16 BRPM | BODY MASS INDEX: 30.96 KG/M2 | OXYGEN SATURATION: 96 % | DIASTOLIC BLOOD PRESSURE: 72 MMHG | SYSTOLIC BLOOD PRESSURE: 118 MMHG | TEMPERATURE: 98 F | HEART RATE: 85 BPM | WEIGHT: 164 LBS

## 2024-09-23 DIAGNOSIS — F32.1 CURRENT MODERATE EPISODE OF MAJOR DEPRESSIVE DISORDER WITHOUT PRIOR EPISODE (HCC): Primary | ICD-10-CM

## 2024-09-23 PROCEDURE — 99213 OFFICE O/P EST LOW 20 MIN: CPT | Performed by: FAMILY MEDICINE

## 2024-09-23 RX ORDER — BUPROPION HYDROCHLORIDE 150 MG/1
150 TABLET ORAL EVERY MORNING
Qty: 30 TABLET | Refills: 5 | Status: SHIPPED | OUTPATIENT
Start: 2024-09-23 | End: 2025-03-22

## 2024-09-23 NOTE — PROGRESS NOTES
"Ambulatory Visit  Name: Elis Patterson      : 1985      MRN: [id number]  Encounter Provider: Korin Caballero MD  Encounter Date: 2024   Encounter department: [facility]    Assessment & Plan  Current moderate episode of major depressive disorder without prior episode (HCC)  No better on lexapro 10mg  will add wellbutrin 150mg po qd  follow up 1 mo    Orders:    buPROPion (WELLBUTRIN XL) 150 mg 24 hr tablet; Take 1 tablet (150 mg total) by mouth every morning       History of Present Illness     Reevaluation of depression on lexapro          Review of Systems   Psychiatric/Behavioral:  Positive for dysphoric mood. Negative for self-injury and suicidal ideas. The patient is nervous/anxious.            Objective     /72 (BP Location: Left arm, Patient Position: Sitting, Cuff Size: Standard)   Pulse 85   Temp 98 °F (36.7 °C) (Tympanic)   Resp 16   Ht 5' 1\" (1.549 m)   Wt 74.4 kg (164 lb)   LMP 2023   SpO2 96%   BMI 30.99 kg/m²     Physical Exam  Constitutional:       Appearance: Normal appearance. She is well-developed.   HENT:      Head: Normocephalic.   Pulmonary:      Effort: Pulmonary effort is normal. No respiratory distress.      Breath sounds: Normal breath sounds.   Neurological:      General: No focal deficit present.      Mental Status: She is alert and oriented to person, place, and time. Mental status is at baseline.   Psychiatric:         Behavior: Behavior normal.         Thought Content: Thought content normal.         Judgment: Judgment normal.     Depression Screening Follow-up Plan: Patient's depression screening was positive with a PHQ-2 score of . Their PHQ-9 score was 13. Patient advised to follow-up with PCP for further management.    "

## 2024-09-23 NOTE — ASSESSMENT & PLAN NOTE
No better on lexapro 10mg  will add wellbutrin 150mg po qd  follow up 1 mo    Orders:    buPROPion (WELLBUTRIN XL) 150 mg 24 hr tablet; Take 1 tablet (150 mg total) by mouth every morning

## 2024-09-27 ENCOUNTER — OFFICE VISIT (OUTPATIENT)
Dept: OBGYN CLINIC | Facility: CLINIC | Age: 39
End: 2024-09-27
Payer: COMMERCIAL

## 2024-09-27 ENCOUNTER — HOSPITAL ENCOUNTER (OUTPATIENT)
Dept: RADIOLOGY | Facility: HOSPITAL | Age: 39
End: 2024-09-27
Payer: COMMERCIAL

## 2024-09-27 VITALS — BODY MASS INDEX: 30.96 KG/M2 | WEIGHT: 164 LBS | HEIGHT: 61 IN

## 2024-09-27 DIAGNOSIS — G56.03 CARPAL TUNNEL SYNDROME, BILATERAL: Primary | ICD-10-CM

## 2024-09-27 DIAGNOSIS — M25.561 CHRONIC PAIN OF BOTH KNEES: ICD-10-CM

## 2024-09-27 DIAGNOSIS — M25.562 CHRONIC PAIN OF BOTH KNEES: ICD-10-CM

## 2024-09-27 DIAGNOSIS — G89.29 CHRONIC PAIN OF BOTH KNEES: ICD-10-CM

## 2024-09-27 PROCEDURE — 73562 X-RAY EXAM OF KNEE 3: CPT

## 2024-09-27 PROCEDURE — 99214 OFFICE O/P EST MOD 30 MIN: CPT | Performed by: STUDENT IN AN ORGANIZED HEALTH CARE EDUCATION/TRAINING PROGRAM

## 2024-09-27 PROCEDURE — 20526 THER INJECTION CARP TUNNEL: CPT | Performed by: STUDENT IN AN ORGANIZED HEALTH CARE EDUCATION/TRAINING PROGRAM

## 2024-09-27 RX ORDER — BETAMETHASONE SODIUM PHOSPHATE AND BETAMETHASONE ACETATE 3; 3 MG/ML; MG/ML
6 INJECTION, SUSPENSION INTRA-ARTICULAR; INTRALESIONAL; INTRAMUSCULAR; SOFT TISSUE
Status: COMPLETED | OUTPATIENT
Start: 2024-09-27 | End: 2024-09-27

## 2024-09-27 RX ADMIN — BETAMETHASONE SODIUM PHOSPHATE AND BETAMETHASONE ACETATE 6 MG: 3; 3 INJECTION, SUSPENSION INTRA-ARTICULAR; INTRALESIONAL; INTRAMUSCULAR; SOFT TISSUE at 08:30

## 2024-09-27 NOTE — PROGRESS NOTES
ORTHOPAEDIC HAND, WRIST, AND ELBOW OFFICE  VISIT      ASSESSMENT/PLAN:      Diagnoses and all orders for this visit:    Carpal tunnel syndrome, bilateral    Other orders  -     Hand/upper extremity injection  -     Hand/upper extremity injection          39 y.o. female with bilateral carpal tunnel syndrome with reproducible symptoms on exam.  However her gross motor function remains intact.    Plan as below  Treatment options and expected outcomes were discussed.  Repeat injections were offered and accepted today  She tolerated the injections well without complication  And sent her carpal tunnel release procedure in the future if steroid injections fail to provide her with lasting relief  No limitations set on her at this time  The patient verbalized understanding of exam findings and treatment plan.   The patient was given the opportunity to ask questions.  Questions were answered to the patient's satisfaction.  The patient decided to move forward with bilateral steroid injections for carpal tunnel syndrome.      Follow Up:  PRN       To Do Next Visit:  Re-evaluation of current issue      Discussions:  Carpal Tunnel Syndrome: The anatomy and physiology of carpal tunnel syndrome was discussed with the patient today.  Increase pressure localized under the transverse carpal ligament can cause pain, numbness, tingling, or dysesthesias within the median nerve distribution as well as feelings of fatigue, clumsiness, or awkwardness.  These symptoms typically occur at night and worse in the morning upon waking.  Eventually, untreated carpal tunnel syndrome can result in weakness and permanent loss of muscle within the thenar compartment of the hand.  Treatment options were discussed with the patient.  Conservative treatment includes nocturnal resting splints to keep the nerve in a neutral position, ergonomic changes within the work or home environment, activity modification, and tendon gliding exercises. Vitamin B6 one  tablet daily over the counter may helpful to reduce symptoms.   Steroid injections within the carpal canal can help a majority of patients, however this is often self-limited in a majority of patients.  Surgical intervention to divide the transverse carpal ligament typically results in a long-lasting relief of the patient's complaints, with the recurrence rate of less than 1%.                                                                                                                                                                               Bubba Yepez MD  Attending, Orthopaedic Surgery  Hand, Wrist, and Elbow Surgery  [facility]    ______________________________________________________________________________________________    CHIEF COMPLAINT:  Chief Complaint   Patient presents with    Follow-up     Follow up of both hands. Injections 4/15/24       SUBJECTIVE:  Patient is a 39 y.o. LHD female who presents today for follow up of her bilateral hand numbness.  She does have a history of underlying carpal tunnel syndrome of the left and right hands.  Previous EMG demonstrated mild findings.  However, she is experiencing frequent symptoms throughout her day as well as at night.  She does work in housekeeping and states that repetitive motions involving flexion extension of the wrist as well as gripping will further exacerbate her symptoms into the thumb, index and long finger.  She notes that it does also wake her up at night.  Despite the use of the cock-up wrist splints.  She did undergo bilateral steroid injections for the carpal tunnel syndrome on April 15, 2024.  She states that she received a few months of symptomatic relief.  She does wish to pursue repeat injections today  Couple months of relief      Occupation: Housekeeping    I have personally reviewed all the relevant PMH, PSH, SH, FH, Medications and allergies      PAST MEDICAL HISTORY:  Past Medical History:    Diagnosis Date    Adenomyosis     Anxiety     Asthma     Depression     Dysmenorrhea     Fibroid     GERD (gastroesophageal reflux disease)     Migraine        PAST SURGICAL HISTORY:  Past Surgical History:   Procedure Laterality Date    CYSTOSCOPY N/A 06/28/2024    Procedure: CYSTOSCOPY;  Surgeon: Brett Everett MD;  Location:  MAIN OR;  Service: Gynecology    HYSTERECTOMY  06/28/24    CT LAPS TOTAL HYSTERECT 250 GM/< W/RMVL TUBE/OVARY N/A 06/28/2024    Procedure: EXAM UNDER ANESTHESIA; HYSTERECTOMY LAPAROSCOPIC TOTAL (LTH) W/ ROBOTICS AND BILATERAL SALPINGECTOMY.;  Surgeon: Brett Everett MD;  Location:  MAIN OR;  Service: Gynecology       FAMILY HISTORY:  Family History   Problem Relation Age of Onset    Heart disease Mother     Hyperlipidemia Mother     Hypertension Mother     Diabetes Mother     Asthma Mother     Depression Mother     Anxiety disorder Mother     No Known Problems Father     Cancer Other         Pt states dads side has cancer unsure what type of cancer.       SOCIAL HISTORY:  Social History     Tobacco Use    Smoking status: Every Day     Current packs/day: 0.25     Average packs/day: 0.3 packs/day for 16.7 years (4.2 ttl pk-yrs)     Types: Cigarettes     Start date: 2008    Smokeless tobacco: Never   Vaping Use    Vaping status: Never Used   Substance Use Topics    Alcohol use: Not Currently     Comment: rare    Drug use: Never       MEDICATIONS:    Current Outpatient Medications:     acetaminophen (TYLENOL) 500 mg tablet, Take 500 mg by mouth every 6 (six) hours as needed for mild pain, Disp: , Rfl:     albuterol (PROVENTIL HFA,VENTOLIN HFA) 90 mcg/act inhaler, Inhale 2 puffs every 4 (four) hours, Disp: 18 g, Rfl: 1    budesonide-formoterol (Symbicort) 160-4.5 mcg/act inhaler, Inhale 2 puffs 2 (two) times a day Rinse mouth after use., Disp: 10.2 g, Rfl: 1    buPROPion (WELLBUTRIN XL) 150 mg 24 hr tablet, Take 1 tablet (150 mg total) by mouth every morning, Disp: 30 tablet, Rfl: 5     docusate sodium (COLACE) 100 mg capsule, Take 1 capsule (100 mg total) by mouth daily, Disp: 10 capsule, Rfl: 1    escitalopram (LEXAPRO) 10 mg tablet, TAKE 1 TABLET BY MOUTH EVERY DAY, Disp: 90 tablet, Rfl: 1    esomeprazole (NexIUM) 40 MG capsule, TAKE 1 CAPSULE (40 MG TOTAL) BY MOUTH EVERY MORNING., Disp: 90 capsule, Rfl: 1    Fluticasone-Salmeterol (Advair Diskus) 250-50 mcg/dose inhaler, Inhale 1 puff 2 (two) times a day Rinse mouth after use., Disp: 60 blister, Rfl: 6    ibuprofen (MOTRIN) 200 mg tablet, Take by mouth every 6 (six) hours as needed for mild pain, Disp: , Rfl:     ALLERGIES:  No Known Allergies        REVIEW OF SYSTEMS:  Musculoskeletal:        As noted in HPI.   All other systems reviewed and are negative.    VITALS:  Vitals:       LABS:  HgA1c:   Lab Results   Component Value Date    HGBA1C 5.5 05/10/2024     BMP:   Lab Results   Component Value Date    CALCIUM 8.9 06/10/2024    K 3.9 06/10/2024    CO2 25 06/10/2024     06/10/2024    BUN 14 06/10/2024    CREATININE 0.78 06/10/2024       _____________________________________________________  PHYSICAL EXAMINATION:  General: Well developed and well nourished, alert & oriented x 3, appears comfortable  Psychiatric: Normal  HEENT: Normocephalic, Atraumatic Trachea Midline, No torticollis  Pulmonary: No audible wheezing or respiratory distress   Abdomen/GI: Non tender, non distended   Cardiovascular: No pitting edema, 2+ radial pulse   Skin: No masses, erythema, lacerations, fluctation, ulcerations  Neurovascular: Sensation Intact to the Median, Ulnar, Radial Nerve, Motor Intact to the Median, Ulnar, Radial Nerve, and Pulses Intact  Musculoskeletal: Normal, except as noted in detailed exam and in HPI.      MUSCULOSKELETAL EXAMINATION:  Left hand  APB: 4+/5  No thenar eminence wasting  FDP: 5/5  Tinel's carpal tunnel: positive      Right hand  APB: 4+/5  No thenar eminence wasting  FDP: 5/5  Tinel's carpal tunnel:  positive  ___________________________________________________  STUDIES REVIEWED:  No new studies to review         PROCEDURES PERFORMED:  Hand/upper extremity injection: R carpal tunnel  [place] Protocol:  procedure performed by consultantConsent: Verbal consent obtained.  Risks and benefits: risks, benefits and alternatives were discussed  Consent given by: patient  Timeout called at: 9/27/2024 8:56 AM.  Patient understanding: patient states understanding of the procedure being performed  Site marked: the operative site was marked  Patient identity confirmed: verbally with patient  Supporting Documentation  Indications: therapeutic   Procedure Details  Condition:carpal tunnel syndrome Site: R carpal tunnel   Needle size: 25 G  Ultrasound guidance: no  Approach: volar  Medications administered: 6 mg betamethasone acetate-betamethasone sodium phosphate 6 (3-3) mg/mL (Ropivacaine 0.2%: 1 mL)  Patient tolerance: patient tolerated the procedure well with no immediate complications  Dressing:  Sterile dressing applied       Hand/upper extremity injection: L carpal tunnel  [place] Protocol:  procedure performed by consultantConsent: Verbal consent obtained.  Risks and benefits: risks, benefits and alternatives were discussed  Consent given by: patient  Timeout called at: 9/27/2024 8:58 AM.  Patient understanding: patient states understanding of the procedure being performed  Site marked: the operative site was marked  Patient identity confirmed: verbally with patient  Supporting Documentation  Indications: therapeutic   Procedure Details  Condition:carpal tunnel syndrome Site: L carpal tunnel   Preparation: Patient was prepped and draped in the usual sterile fashion  Needle size: 25 G  Ultrasound guidance: no  Approach: volar  Medications administered: 6 mg betamethasone acetate-betamethasone sodium phosphate 6 (3-3) mg/mL (Ropivacaine 0.2%: 1 mL)  Patient tolerance: patient tolerated the procedure well with no  immediate complications  Dressing:  Sterile dressing applied           _____________________________________________________      Scribe Attestation      I,:  Jefry Montes am acting as a scribe while in the presence of the attending physician.:       I,:  uBbba Yepez MD personally performed the services described in this documentation    as scribed in my presence.:

## 2024-10-02 ENCOUNTER — TELEPHONE (OUTPATIENT)
Dept: GASTROENTEROLOGY | Facility: CLINIC | Age: 39
End: 2024-10-02

## 2024-10-02 ENCOUNTER — OFFICE VISIT (OUTPATIENT)
Dept: GASTROENTEROLOGY | Facility: CLINIC | Age: 39
End: 2024-10-02
Payer: COMMERCIAL

## 2024-10-02 VITALS
HEART RATE: 73 BPM | HEIGHT: 61 IN | SYSTOLIC BLOOD PRESSURE: 106 MMHG | DIASTOLIC BLOOD PRESSURE: 73 MMHG | WEIGHT: 161.8 LBS | BODY MASS INDEX: 30.55 KG/M2

## 2024-10-02 DIAGNOSIS — R19.8 ABNORMAL BOWEL HABITS: ICD-10-CM

## 2024-10-02 DIAGNOSIS — R10.11 RUQ PAIN: Primary | ICD-10-CM

## 2024-10-02 DIAGNOSIS — K62.5 RECTAL BLEEDING: ICD-10-CM

## 2024-10-02 PROCEDURE — 99214 OFFICE O/P EST MOD 30 MIN: CPT | Performed by: INTERNAL MEDICINE

## 2024-10-02 NOTE — PROGRESS NOTES
"Ambulatory Visit  Name: Elis Patterson      : 1985      MRN: [id number]  Encounter Provider: Paolo Aguila MD  Encounter Date: 10/2/2024   Encounter department: [facility] [address]    Assessment & Plan  RUQ pain  Certainly concern for peptic ulcer disease though differential diagnosis would include a number of other possibilities.  Will evaluate as below.  Have advised her to minimize NSAID use.  Will continue esomeprazole    Orders:    C-reactive protein; Future    Calprotectin,Fecal; Future    CBC; Future    Comprehensive metabolic panel; Future    H. pylori antigen, stool; Future    Celiac Disease Panel; Future    US right upper quadrant; Future    EGD; Future    Rectal bleeding  Likely hemorrhoidal, though given family history of Crohn's IBD certainly possibility.    Orders:    Colonoscopy; Future    Abnormal bowel habits  Will evaluate with colonoscopy as above         History of Present Illness     Elis Patterson is a 39 y.o. female who presents with continued symptoms of abdominal pain primarily in the right upper quadrant, irregular bowel function and bright red rectal bleeding.  Started on esomeprazole at her last visit which has helped her symptoms somewhat now with no further vomiting, but the above symptoms persist.  Takes ibuprofen frequently for musculoskeletal pain and continues to smoke      Review of Systems        Objective     /73 (BP Location: Left arm, Patient Position: Sitting, Cuff Size: Standard)   Pulse 73   Ht 5' 1\" (1.549 m)   Wt 73.4 kg (161 lb 12.8 oz)   LMP 2023   BMI 30.57 kg/m²     Physical Exam  Vitals and nursing note reviewed.   Constitutional:       General: She is not in acute distress.     Appearance: She is not ill-appearing.   HENT:      Head: Normocephalic and atraumatic.   Eyes:      General: No scleral icterus.     Extraocular Movements: Extraocular movements intact.   Cardiovascular:      Rate and " Rhythm: Normal rate and regular rhythm.   Pulmonary:      Effort: Pulmonary effort is normal. No respiratory distress.   Abdominal:      General: There is no distension.      Palpations: Abdomen is soft.      Tenderness: There is abdominal tenderness in the right upper quadrant. There is no guarding or rebound. Negative signs include Irving's sign.   Skin:     General: Skin is warm and dry.      Coloration: Skin is not cyanotic.      Findings: No erythema.   Neurological:      General: No focal deficit present.      Mental Status: She is alert and oriented to person, place, and time.   Psychiatric:         Mood and Affect: Mood normal.         Behavior: Behavior normal.

## 2024-10-02 NOTE — TELEPHONE ENCOUNTER
Scheduled date of EGD/colonoscopy (as of today):11/6/24  Physician performing EGD/colonoscopy:Dr Aguila   Location of EGD/colonoscopy:   Desired bowel prep reviewed with patient miralax   Instructions reviewed with patient by:sb  Clearances:  none

## 2024-10-05 ENCOUNTER — LAB (OUTPATIENT)
Dept: LAB | Facility: HOSPITAL | Age: 39
End: 2024-10-05
Attending: INTERNAL MEDICINE
Payer: COMMERCIAL

## 2024-10-05 DIAGNOSIS — R10.11 RUQ PAIN: ICD-10-CM

## 2024-10-05 LAB
ALBUMIN SERPL BCG-MCNC: 4.2 G/DL (ref 3.5–5)
ALP SERPL-CCNC: 79 U/L (ref 34–104)
ALT SERPL W P-5'-P-CCNC: 11 U/L (ref 7–52)
ANION GAP SERPL CALCULATED.3IONS-SCNC: 7 MMOL/L (ref 4–13)
AST SERPL W P-5'-P-CCNC: 11 U/L (ref 13–39)
BILIRUB SERPL-MCNC: 0.81 MG/DL (ref 0.2–1)
BUN SERPL-MCNC: 15 MG/DL (ref 5–25)
CALCIUM SERPL-MCNC: 9 MG/DL (ref 8.4–10.2)
CHLORIDE SERPL-SCNC: 105 MMOL/L (ref 96–108)
CO2 SERPL-SCNC: 25 MMOL/L (ref 21–32)
CREAT SERPL-MCNC: 0.85 MG/DL (ref 0.6–1.3)
CRP SERPL QL: 2 MG/L
ERYTHROCYTE [DISTWIDTH] IN BLOOD BY AUTOMATED COUNT: 12.9 % (ref 11.6–15.1)
GFR SERPL CREATININE-BSD FRML MDRD: 87 ML/MIN/1.73SQ M
GLUCOSE P FAST SERPL-MCNC: 94 MG/DL (ref 65–99)
HCT VFR BLD AUTO: 45.3 % (ref 34.8–46.1)
HGB BLD-MCNC: 15.1 G/DL (ref 11.5–15.4)
IGA SERPL-MCNC: 107 MG/DL (ref 66–433)
MCH RBC QN AUTO: 30.8 PG (ref 26.8–34.3)
MCHC RBC AUTO-ENTMCNC: 33.3 G/DL (ref 31.4–37.4)
MCV RBC AUTO: 92 FL (ref 82–98)
PLATELET # BLD AUTO: 233 THOUSANDS/UL (ref 149–390)
PMV BLD AUTO: 11.1 FL (ref 8.9–12.7)
POTASSIUM SERPL-SCNC: 4.3 MMOL/L (ref 3.5–5.3)
PROT SERPL-MCNC: 6.6 G/DL (ref 6.4–8.4)
RBC # BLD AUTO: 4.91 MILLION/UL (ref 3.81–5.12)
SODIUM SERPL-SCNC: 137 MMOL/L (ref 135–147)
WBC # BLD AUTO: 10.95 THOUSAND/UL (ref 4.31–10.16)

## 2024-10-05 PROCEDURE — 86258 DGP ANTIBODY EACH IG CLASS: CPT

## 2024-10-05 PROCEDURE — 36415 COLL VENOUS BLD VENIPUNCTURE: CPT

## 2024-10-05 PROCEDURE — 86140 C-REACTIVE PROTEIN: CPT

## 2024-10-05 PROCEDURE — 82784 ASSAY IGA/IGD/IGG/IGM EACH: CPT

## 2024-10-05 PROCEDURE — 85027 COMPLETE CBC AUTOMATED: CPT

## 2024-10-05 PROCEDURE — 80053 COMPREHEN METABOLIC PANEL: CPT

## 2024-10-05 PROCEDURE — 86364 TISS TRNSGLTMNASE EA IG CLAS: CPT

## 2024-10-06 LAB
GLIADIN PEPTIDE IGA SER-ACNC: 0.7 U/ML
GLIADIN PEPTIDE IGA SER-ACNC: NEGATIVE
GLIADIN PEPTIDE IGG SER-ACNC: <0.4 U/ML
GLIADIN PEPTIDE IGG SER-ACNC: NEGATIVE
TTG IGA SER-ACNC: <0.5 U/ML
TTG IGA SER-ACNC: NEGATIVE
TTG IGG SER-ACNC: <0.8 U/ML
TTG IGG SER-ACNC: NEGATIVE

## 2024-10-08 ENCOUNTER — HOSPITAL ENCOUNTER (OUTPATIENT)
Dept: ULTRASOUND IMAGING | Facility: HOSPITAL | Age: 39
Discharge: HOME | End: 2024-10-08
Attending: INTERNAL MEDICINE
Payer: COMMERCIAL

## 2024-10-08 DIAGNOSIS — R10.11 RUQ PAIN: ICD-10-CM

## 2024-10-08 PROCEDURE — 76705 ECHO EXAM OF ABDOMEN: CPT

## 2024-10-08 NOTE — RESULT ENCOUNTER NOTE
Please call the patient regarding results.  Labs do not suggest celiac disease.  Ultrasound results not yet available

## 2024-10-14 ENCOUNTER — APPOINTMENT (OUTPATIENT)
Dept: LAB | Facility: HOSPITAL | Age: 39
End: 2024-10-14
Payer: COMMERCIAL

## 2024-10-14 PROCEDURE — 87338 HPYLORI STOOL AG IA: CPT

## 2024-10-14 PROCEDURE — 83993 ASSAY FOR CALPROTECTIN FECAL: CPT

## 2024-10-15 DIAGNOSIS — F32.1 CURRENT MODERATE EPISODE OF MAJOR DEPRESSIVE DISORDER WITHOUT PRIOR EPISODE (HCC): ICD-10-CM

## 2024-10-16 LAB
CALPROTECTIN STL-MCNC: 24.3 ΜG/G
H PYLORI AG STL QL IA: POSITIVE

## 2024-10-16 RX ORDER — BUPROPION HYDROCHLORIDE 150 MG/1
150 TABLET ORAL EVERY MORNING
Qty: 90 TABLET | Refills: 1 | Status: SHIPPED | OUTPATIENT
Start: 2024-10-16

## 2024-10-16 NOTE — RESULT ENCOUNTER NOTE
Please call the patient regarding results.  Right upper quadrant ultrasound was normal.  No explanation for symptoms.

## 2024-10-17 DIAGNOSIS — A04.8 H. PYLORI INFECTION: Primary | ICD-10-CM

## 2024-10-17 RX ORDER — METRONIDAZOLE 250 MG/1
250 TABLET ORAL EVERY 6 HOURS
Qty: 56 TABLET | Refills: 0 | Status: SHIPPED | OUTPATIENT
Start: 2024-10-17 | End: 2024-10-31

## 2024-10-17 RX ORDER — TETRACYCLINE HYDROCHLORIDE 500 MG/1
500 CAPSULE ORAL 4 TIMES DAILY
Qty: 56 CAPSULE | Refills: 0 | Status: SHIPPED | OUTPATIENT
Start: 2024-10-17 | End: 2024-10-31

## 2024-10-17 RX ORDER — BISMUTH SUBSALICYLATE 262 MG/1
262 TABLET, CHEWABLE ORAL
Qty: 56 TABLET | Refills: 0 | Status: SHIPPED | OUTPATIENT
Start: 2024-10-17 | End: 2024-10-31

## 2024-10-17 NOTE — TELEPHONE ENCOUNTER
Pt returning call, I reviewed h pylori results and quad therapy antibx. Pt is already on nexium 40 mg daily. She is aware stool study needed one month after finishing antibiotics to check eradication.   She is aware if pepto bismol not covered by insurance to buy otc.     No further questions.

## 2024-10-17 NOTE — TELEPHONE ENCOUNTER
Pt returning a call re: results. She is aware of positive h-pylori but wants to discuss medication. I warm transfer to gi triage nurse Paola.

## 2024-10-23 ENCOUNTER — TELEPHONE (OUTPATIENT)
Age: 39
End: 2024-10-23

## 2024-10-23 ENCOUNTER — ANESTHESIA (OUTPATIENT)
Dept: ANESTHESIOLOGY | Facility: HOSPITAL | Age: 39
End: 2024-10-23

## 2024-10-23 ENCOUNTER — ANESTHESIA EVENT (OUTPATIENT)
Dept: ANESTHESIOLOGY | Facility: HOSPITAL | Age: 39
End: 2024-10-23

## 2024-10-23 NOTE — TELEPHONE ENCOUNTER
Please advise     Pt calling in, reports she is on day 4 of quad therapy antibx for h pylori and is experiencing side effects of nausea all through out the day and vomiting 3-4 x a day. She is having a hard time with regimen. Pt is taking pepto bismol and taking antibx with food.     I did explain this is a common side effect of antibx. I will defer recs to provider.     Please advise if zofran can be called in for pt?

## 2024-10-25 ENCOUNTER — OFFICE VISIT (OUTPATIENT)
Dept: FAMILY MEDICINE CLINIC | Facility: CLINIC | Age: 39
End: 2024-10-25
Payer: COMMERCIAL

## 2024-10-25 VITALS
BODY MASS INDEX: 30.58 KG/M2 | HEIGHT: 61 IN | RESPIRATION RATE: 16 BRPM | TEMPERATURE: 98 F | HEART RATE: 78 BPM | OXYGEN SATURATION: 97 % | WEIGHT: 162 LBS | SYSTOLIC BLOOD PRESSURE: 118 MMHG | DIASTOLIC BLOOD PRESSURE: 72 MMHG

## 2024-10-25 DIAGNOSIS — R11.0 NAUSEA: Primary | ICD-10-CM

## 2024-10-25 DIAGNOSIS — F32.1 MODERATE MAJOR DEPRESSION, SINGLE EPISODE (HCC): ICD-10-CM

## 2024-10-25 DIAGNOSIS — F32.1 CURRENT MODERATE EPISODE OF MAJOR DEPRESSIVE DISORDER WITHOUT PRIOR EPISODE (HCC): Primary | ICD-10-CM

## 2024-10-25 PROCEDURE — 99213 OFFICE O/P EST LOW 20 MIN: CPT | Performed by: FAMILY MEDICINE

## 2024-10-25 RX ORDER — ONDANSETRON 4 MG/1
4 TABLET, ORALLY DISINTEGRATING ORAL EVERY 8 HOURS PRN
Qty: 20 TABLET | Refills: 1 | Status: SHIPPED | OUTPATIENT
Start: 2024-10-25

## 2024-10-25 NOTE — PROGRESS NOTES
"Ambulatory Visit  Name: Elis Patterson      : 1985      MRN: [id number]  Encounter Provider: Korin Caballero MD  Encounter Date: 10/25/2024   Encounter department: [facility]  Depression Screening Follow-up Plan: Patient's depression screening was positive with a PHQ-2 score of . Their PHQ-9 score was 9. Patient advised to follow-up with PCP for further management.  Assessment & Plan  Current moderate episode of major depressive disorder without prior episode (HCC)  Doing better on wellbutrin wishes to stay on this dose          Moderate major depression, single episode (HCC)  Depression Screening Follow-up Plan: Patient's depression screening was positive with a PHQ-9 score of 9. Patient advised to follow-up with PCP for further management.            History of Present Illness     Pt here for reevaluation of depression on meds feels better on wellbutin          Review of Systems   Psychiatric/Behavioral:  Positive for dysphoric mood (only mild now much improved).            Objective     /72 (BP Location: Left arm, Patient Position: Sitting, Cuff Size: Standard)   Pulse 78   Temp 98 °F (36.7 °C) (Tympanic)   Resp 16   Ht 5' 1\" (1.549 m)   Wt 73.5 kg (162 lb)   LMP 2023   SpO2 97%   BMI 30.61 kg/m²     Physical Exam  Constitutional:       Appearance: Normal appearance. She is well-developed.   HENT:      Head: Normocephalic.   Pulmonary:      Effort: Pulmonary effort is normal. No respiratory distress.      Breath sounds: Normal breath sounds.   Neurological:      General: No focal deficit present.      Mental Status: She is alert and oriented to person, place, and time. Mental status is at baseline.   Psychiatric:         Behavior: Behavior normal.         Thought Content: Thought content normal.         Judgment: Judgment normal.       Depression Screening Follow-up Plan: Patient's depression screening was positive with a PHQ-2 score of . Their PHQ-9 " score was 9. Patient advised to follow-up with PCP for further management.

## 2024-10-25 NOTE — TELEPHONE ENCOUNTER
Pt. Called back, asking if zofran order can be placed, advised we were waiting for response from Dr. Aguila, advised a second message will be sent to provider for advisement, pt. Appreciative

## 2024-11-06 ENCOUNTER — HOSPITAL ENCOUNTER (OUTPATIENT)
Dept: GASTROENTEROLOGY | Facility: AMBULARY SURGERY CENTER | Age: 39
Setting detail: OUTPATIENT SURGERY
Discharge: HOME | End: 2024-11-06
Attending: INTERNAL MEDICINE
Payer: COMMERCIAL

## 2024-11-06 ENCOUNTER — ANESTHESIA (OUTPATIENT)
Dept: GASTROENTEROLOGY | Facility: AMBULARY SURGERY CENTER | Age: 39
End: 2024-11-06
Payer: COMMERCIAL

## 2024-11-06 VITALS
RESPIRATION RATE: 18 BRPM | OXYGEN SATURATION: 99 % | DIASTOLIC BLOOD PRESSURE: 67 MMHG | HEART RATE: 81 BPM | TEMPERATURE: 98.2 F | SYSTOLIC BLOOD PRESSURE: 106 MMHG

## 2024-11-06 DIAGNOSIS — K62.5 RECTAL BLEEDING: ICD-10-CM

## 2024-11-06 DIAGNOSIS — R10.11 RUQ PAIN: ICD-10-CM

## 2024-11-06 PROCEDURE — 88305 TISSUE EXAM BY PATHOLOGIST: CPT | Performed by: STUDENT IN AN ORGANIZED HEALTH CARE EDUCATION/TRAINING PROGRAM

## 2024-11-06 PROCEDURE — 45380 COLONOSCOPY AND BIOPSY: CPT | Performed by: INTERNAL MEDICINE

## 2024-11-06 PROCEDURE — 43239 EGD BIOPSY SINGLE/MULTIPLE: CPT | Performed by: INTERNAL MEDICINE

## 2024-11-06 RX ORDER — PHENYLEPHRINE HCL IN 0.9% NACL 1 MG/10 ML
SYRINGE (ML) INTRAVENOUS AS NEEDED
Status: DISCONTINUED | OUTPATIENT
Start: 2024-11-06 | End: 2024-11-06

## 2024-11-06 RX ORDER — PROPOFOL 10 MG/ML
INJECTION, EMULSION INTRAVENOUS CONTINUOUS PRN
Status: DISCONTINUED | OUTPATIENT
Start: 2024-11-06 | End: 2024-11-06

## 2024-11-06 RX ORDER — PROPOFOL 10 MG/ML
INJECTION, EMULSION INTRAVENOUS AS NEEDED
Status: DISCONTINUED | OUTPATIENT
Start: 2024-11-06 | End: 2024-11-06

## 2024-11-06 RX ORDER — SODIUM CHLORIDE, SODIUM LACTATE, POTASSIUM CHLORIDE, CALCIUM CHLORIDE 600; 310; 30; 20 MG/100ML; MG/100ML; MG/100ML; MG/100ML
125 INJECTION, SOLUTION INTRAVENOUS CONTINUOUS
Status: CANCELLED | OUTPATIENT
Start: 2024-11-06

## 2024-11-06 RX ORDER — LIDOCAINE HYDROCHLORIDE 10 MG/ML
INJECTION, SOLUTION EPIDURAL; INFILTRATION; INTRACAUDAL; PERINEURAL AS NEEDED
Status: DISCONTINUED | OUTPATIENT
Start: 2024-11-06 | End: 2024-11-06

## 2024-11-06 RX ORDER — SODIUM CHLORIDE, SODIUM LACTATE, POTASSIUM CHLORIDE, CALCIUM CHLORIDE 600; 310; 30; 20 MG/100ML; MG/100ML; MG/100ML; MG/100ML
125 INJECTION, SOLUTION INTRAVENOUS CONTINUOUS
Status: DISCONTINUED | OUTPATIENT
Start: 2024-11-06 | End: 2024-11-10 | Stop reason: HOSPADM

## 2024-11-06 RX ADMIN — PROPOFOL 140 MCG/KG/MIN: 10 INJECTION, EMULSION INTRAVENOUS at 12:55

## 2024-11-06 RX ADMIN — Medication 100 MCG: at 13:18

## 2024-11-06 RX ADMIN — LIDOCAINE HYDROCHLORIDE 50 MG: 10 INJECTION, SOLUTION EPIDURAL; INFILTRATION; INTRACAUDAL; PERINEURAL at 12:55

## 2024-11-06 RX ADMIN — SODIUM CHLORIDE, SODIUM LACTATE, POTASSIUM CHLORIDE, AND CALCIUM CHLORIDE 125 ML/HR: .6; .31; .03; .02 INJECTION, SOLUTION INTRAVENOUS at 12:22

## 2024-11-06 RX ADMIN — PROPOFOL 200 MG: 10 INJECTION, EMULSION INTRAVENOUS at 12:55

## 2024-11-06 RX ADMIN — Medication 200 MCG: at 13:05

## 2024-11-06 RX ADMIN — PROPOFOL 50 MG: 10 INJECTION, EMULSION INTRAVENOUS at 12:56

## 2024-11-06 RX ADMIN — PROPOFOL 50 MG: 10 INJECTION, EMULSION INTRAVENOUS at 12:57

## 2024-11-06 NOTE — ANESTHESIA PREPROCEDURE EVALUATION
Procedure:  EGD  COLONOSCOPY    Relevant Problems   GI/HEPATIC   (+) Gastroesophageal reflux disease without esophagitis      NEURO/PSYCH   (+) Current moderate episode of major depressive disorder without prior episode (HCC)   (+) Numbness and tingling in both hands      PULMONARY   (+) Moderate persistent asthma without complication        Physical Exam    Airway    Mallampati score: I  TM Distance: >3 FB  Neck ROM: full     Dental       Cardiovascular      Pulmonary      Other Findings  post-pubertal.      Anesthesia Plan  ASA Score- 2     Anesthesia Type- IV sedation with anesthesia with ASA Monitors.         Additional Monitors:     Airway Plan:            Plan Factors-Exercise tolerance (METS): >4 METS.    Chart reviewed.    Patient summary reviewed.    Patient is a current smoker.  Patient instructed to abstain from smoking on day of procedure. Patient smoked on day of surgery.    There is medical exclusion for perioperative obstructive sleep apnea risk education.        Induction- intravenous.    Postoperative Plan-         Informed Consent- Anesthetic plan and risks discussed with patient.  I personally reviewed this patient with the CRNA. Discussed and agreed on the Anesthesia Plan with the CRNA..

## 2024-11-06 NOTE — H&P
History and Physical -  [facility]  Elis Patterson 39 y.o. female MRN: [id number]                  HPI: Elis Patterson is a 39 y.o. year old female who presents for abdominal pain, rectal bleeding      REVIEW OF SYSTEMS: Per the HPI, and otherwise unremarkable.    Historical Information   Past Medical History:   Diagnosis Date    Adenomyosis     Anxiety     Asthma     Depression     Dysmenorrhea     Fibroid     GERD (gastroesophageal reflux disease)     Migraine      Past Surgical History:   Procedure Laterality Date    CYSTOSCOPY N/A 06/28/2024    Procedure: CYSTOSCOPY;  Surgeon: Brett Everett MD;  Location:  MAIN OR;  Service: Gynecology    HYSTERECTOMY  06/28/24    MT LAPS TOTAL HYSTERECT 250 GM/< W/RMVL TUBE/OVARY N/A 06/28/2024    Procedure: EXAM UNDER ANESTHESIA; HYSTERECTOMY LAPAROSCOPIC TOTAL (LTH) W/ ROBOTICS AND BILATERAL SALPINGECTOMY.;  Surgeon: Brett Everett MD;  Location:  MAIN OR;  Service: Gynecology     Social History   Social History     Substance and Sexual Activity   Alcohol Use Not Currently    Comment: rare     Social History     Substance and Sexual Activity   Drug Use Never     Social History     Tobacco Use   Smoking Status Every Day    Current packs/day: 0.25    Average packs/day: 0.3 packs/day for 16.8 years (4.2 ttl pk-yrs)    Types: Cigarettes    Start date: 2008   Smokeless Tobacco Never     Family History   Problem Relation Age of Onset    Heart disease Mother     Hyperlipidemia Mother     Hypertension Mother     Diabetes Mother     Asthma Mother     Depression Mother     Anxiety disorder Mother     No Known Problems Father     Cancer Other         Pt states dads side has cancer unsure what type of cancer.       Meds/Allergies       Current Outpatient Medications:     budesonide-formoterol (Symbicort) 160-4.5 mcg/act inhaler    buPROPion (WELLBUTRIN XL) 150 mg 24 hr tablet    escitalopram (LEXAPRO) 10 mg tablet    esomeprazole (NexIUM) 40 MG capsule     ibuprofen (MOTRIN) 200 mg tablet    ondansetron (ZOFRAN-ODT) 4 mg disintegrating tablet    acetaminophen (TYLENOL) 500 mg tablet    Current Facility-Administered Medications:     lactated ringers infusion, 125 mL/hr, Intravenous, Continuous, 125 mL/hr at 11/06/24 1222    No Known Allergies    Objective     /63   Pulse 82   Temp 98.2 °F (36.8 °C) (Skin)   Resp 18   LMP 09/26/2023   SpO2 98%       PHYSICAL EXAM    Gen: NAD  Head: NCAT  CV: RRR  CHEST: Clear  ABD: soft, NT/ND  EXT: no edema      ASSESSMENT/PLAN:  This is a 39 y.o. year old female here for EGD, colonoscopy, and she is stable and optimized for her procedure.

## 2024-11-06 NOTE — ANESTHESIA POSTPROCEDURE EVALUATION
Post-Op Assessment Note    CV Status:  Stable  Pain Score: 0    Pain management: adequate       Mental Status:  Alert and awake   Hydration Status:  Euvolemic   PONV Controlled:  Controlled   Airway Patency:  Patent     Post Op Vitals Reviewed: Yes    No anethesia notable event occurred.    Staff: Anesthesiologist, CRNA   Comments: Report given to recovering RN, VSS, Pt states she is comfortable          Last Filed PACU Vitals:  Vitals Value Taken Time   Temp     Pulse 70 11/06/24 1322   /77 11/06/24 1322   Resp 18 11/06/24 1322   SpO2 94 % 11/06/24 1322       Modified Ashley:  Activity: 2 (11/6/2024 11:10 AM)  Respiration: 2 (11/6/2024 11:10 AM)  Circulation: 2 (11/6/2024 11:10 AM)  Consciousness: 2 (11/6/2024 11:10 AM)  Oxygen Saturation: 2 (11/6/2024 11:10 AM)  Modified Ashley Score: 10 (11/6/2024 11:10 AM)

## 2024-11-07 ENCOUNTER — APPOINTMENT (OUTPATIENT)
Dept: LAB | Facility: HOSPITAL | Age: 39
End: 2024-11-07
Payer: COMMERCIAL

## 2024-11-07 ENCOUNTER — NURSE TRIAGE (OUTPATIENT)
Age: 39
End: 2024-11-07

## 2024-11-07 ENCOUNTER — OFFICE VISIT (OUTPATIENT)
Dept: OBGYN CLINIC | Facility: CLINIC | Age: 39
End: 2024-11-07
Payer: COMMERCIAL

## 2024-11-07 VITALS
BODY MASS INDEX: 30.62 KG/M2 | DIASTOLIC BLOOD PRESSURE: 64 MMHG | SYSTOLIC BLOOD PRESSURE: 124 MMHG | WEIGHT: 162.2 LBS | HEIGHT: 61 IN

## 2024-11-07 DIAGNOSIS — N94.9 VAGINAL DISCOMFORT: ICD-10-CM

## 2024-11-07 DIAGNOSIS — Z11.3 SCREENING FOR STDS (SEXUALLY TRANSMITTED DISEASES): ICD-10-CM

## 2024-11-07 DIAGNOSIS — R10.2 PELVIC PAIN: Primary | ICD-10-CM

## 2024-11-07 PROCEDURE — 99213 OFFICE O/P EST LOW 20 MIN: CPT | Performed by: OBSTETRICS & GYNECOLOGY

## 2024-11-07 PROCEDURE — 87660 TRICHOMONAS VAGIN DIR PROBE: CPT | Performed by: OBSTETRICS & GYNECOLOGY

## 2024-11-07 PROCEDURE — 87086 URINE CULTURE/COLONY COUNT: CPT | Performed by: OBSTETRICS & GYNECOLOGY

## 2024-11-07 PROCEDURE — 87510 GARDNER VAG DNA DIR PROBE: CPT | Performed by: OBSTETRICS & GYNECOLOGY

## 2024-11-07 PROCEDURE — 87591 N.GONORRHOEAE DNA AMP PROB: CPT | Performed by: OBSTETRICS & GYNECOLOGY

## 2024-11-07 PROCEDURE — 87480 CANDIDA DNA DIR PROBE: CPT | Performed by: OBSTETRICS & GYNECOLOGY

## 2024-11-07 PROCEDURE — 87491 CHLMYD TRACH DNA AMP PROBE: CPT | Performed by: OBSTETRICS & GYNECOLOGY

## 2024-11-07 NOTE — TELEPHONE ENCOUNTER
"Patient calling to report pelvic pain for the last few weeks. Had hysterectomy on 6/28. Notes monthly cramping likely due to cycle/ovulation but pain has become for frequent in the last few weeks. Denies vaginal bleeding but notes brown discharge today. Appointment scheduled.    Reason for Disposition  • MILD to MODERATE pain that comes and goes (cramps) and present > 48 hours    Answer Assessment - Initial Assessment Questions  1. LOCATION: \"Where does it hurt?\"       pelvis  2. RADIATION: \"Does the pain shoot anywhere else?\" (e.g., lower back, groin, thighs)      no  3. ONSET: \"When did the pain begin?\" (e.g., minutes, hours or days ago)       Few weeks  4. SUDDEN: \"Gradual or sudden onset?\"      Gradually more frequent  5. PATTERN \"Does the pain come and go, or is it constant?\"      Waxes and wanes  6. SEVERITY: \"How bad is the pain?\"  (e.g., Scale 1-10; mild, moderate, or severe)      Mild-mod  7. RECURRENT SYMPTOM: \"Have you ever had this type of pelvic pain before?\" If Yes, ask: \"When was the last time?\" and \"What happened that time?\"       denies  8. CAUSE: \"What do you think is causing the pelvic pain?\"      unsure  9. RELIEVING/AGGRAVATING FACTORS: \"What makes it better or worse?\" (e.g., activity/rest, sexual intercourse, voiding, passing stool)      none  10. OTHER SYMPTOMS: \"Has there been any other symptoms?\" (e.g., fever, constipation, diarrhea, urine problems, vaginal bleeding, vaginal discharge, or vomiting?\"        1 episode brown discharge, denies bleeding  11. PREGNANCY: \"Is there any chance you are pregnant?\" \"When was your last menstrual period?\"        N/A    Protocols used: Pelvic Pain - Female-Adult-[place]    "

## 2024-11-07 NOTE — PROGRESS NOTES
"Assessment/Plan:     There are no diagnoses linked to this encounter.      39-year-old female  Lower pelvic pain/diverticulosis  History of dysmenorrhea  Smoker  Had robotic hysterectomy bilateral salpingectomy secondary to adenomyosis and fibroid uterus  Pap and HPV -2023  History of TRICH on Pap  Plan  Follow-up with GI  To go for pelvic ultrasound check for ovarian cyst  GC/CT/affirm  Warm compress/Tylenol as needed for pain  Return to office in 3 months for follow-up    Subjective:      Patient ID: Elis Patterson is a 39 y.o. female.  39-year-old female present to the office today secondary to pelvic pain  New onset of vaginal pain started today patient has colonoscopy yesterday told she has diverticulosis  Pelvic Pain  The patient's primary symptoms include pelvic pain. This is a new problem. The current episode started 1 to 4 weeks ago. The problem occurs constantly. The problem has been unchanged. The pain is mild. The problem affects both sides. Associated symptoms include abdominal pain and back pain. Pertinent negatives include no constipation, diarrhea, dysuria, nausea or urgency. Nothing aggravates the symptoms. She has tried nothing for the symptoms. She is not sexually active. She uses hysterectomy for contraception.       The following portions of the patient's history were reviewed and updated as appropriate: allergies, current medications, past family history, past medical history, past social history, past surgical history and problem list.    Review of Systems   Gastrointestinal:  Positive for abdominal pain. Negative for constipation, diarrhea and nausea.   Genitourinary:  Positive for pelvic pain. Negative for dysuria and urgency.   Musculoskeletal:  Positive for back pain.         Objective:      /64 (BP Location: Left arm, Patient Position: Sitting, Cuff Size: Adult)   Ht 5' 1\" (1.549 m)   Wt 73.6 kg (162 lb 3.2 oz)   LMP 09/26/2023   BMI 30.65 kg/m²          Physical " Exam  Constitutional:       Appearance: She is well-developed.   Abdominal:      General: There is no distension.      Palpations: Abdomen is soft.      Tenderness: There is no abdominal tenderness.   Genitourinary:     Labia:         Right: No rash, tenderness or lesion.         Left: No rash, tenderness or lesion.       Vagina: No signs of injury. No vaginal discharge, erythema or tenderness.      Adnexa:         Right: Tenderness present. No mass or fullness.          Left: Tenderness present. No mass or fullness.        Comments: Suprapubic tenderness lower pelvic pain mild  Neurological:      Mental Status: She is alert and oriented to person, place, and time.   Psychiatric:         Behavior: Behavior normal.

## 2024-11-07 NOTE — TELEPHONE ENCOUNTER
Patient called about her appointment today, confirmed 3:45 PM, arrive 3:30PM. Patient asked if she can be billed for appointment, confirmed EVELIACIRO can bill her for copay or she can pay partial copay today, patient verbalzied understanding

## 2024-11-08 LAB
BACTERIA UR CULT: NORMAL
C TRACH DNA SPEC QL NAA+PROBE: NEGATIVE
CANDIDA RRNA VAG QL PROBE: NOT DETECTED
G VAGINALIS RRNA GENITAL QL PROBE: NOT DETECTED
N GONORRHOEA DNA SPEC QL NAA+PROBE: NEGATIVE
T VAGINALIS RRNA GENITAL QL PROBE: NOT DETECTED

## 2024-11-11 ENCOUNTER — HOSPITAL ENCOUNTER (OUTPATIENT)
Dept: ULTRASOUND IMAGING | Facility: HOSPITAL | Age: 39
Discharge: HOME | End: 2024-11-11
Attending: OBSTETRICS & GYNECOLOGY
Payer: COMMERCIAL

## 2024-11-11 DIAGNOSIS — R10.2 PELVIC PAIN: ICD-10-CM

## 2024-11-11 PROCEDURE — 76856 US EXAM PELVIC COMPLETE: CPT

## 2024-11-11 PROCEDURE — 76830 TRANSVAGINAL US NON-OB: CPT

## 2024-11-12 PROCEDURE — 88305 TISSUE EXAM BY PATHOLOGIST: CPT | Performed by: STUDENT IN AN ORGANIZED HEALTH CARE EDUCATION/TRAINING PROGRAM

## 2024-11-20 ENCOUNTER — RESULTS FOLLOW-UP (OUTPATIENT)
Dept: OBGYN CLINIC | Facility: CLINIC | Age: 39
End: 2024-11-20

## 2024-11-21 ENCOUNTER — RESULTS FOLLOW-UP (OUTPATIENT)
Dept: GASTROENTEROLOGY | Facility: CLINIC | Age: 39
End: 2024-11-21

## 2024-11-21 NOTE — RESULT ENCOUNTER NOTE
Please call the patient regarding results.  Stomach biopsies show H. pylori gastritis.  Colon polyps were benign.  Please treat with tetracycline based quadruple therapy per protocol.  Repeat colonoscopy in 10 years

## 2024-11-22 DIAGNOSIS — A04.8 H. PYLORI INFECTION: Primary | ICD-10-CM

## 2024-11-25 RX ORDER — AMOXICILLIN 500 MG/1
1000 CAPSULE ORAL EVERY 12 HOURS SCHEDULED
Qty: 56 CAPSULE | Refills: 0 | Status: SHIPPED | OUTPATIENT
Start: 2024-11-25 | End: 2024-12-09

## 2024-11-25 RX ORDER — CLARITHROMYCIN 500 MG/1
500 TABLET ORAL EVERY 12 HOURS SCHEDULED
Qty: 28 TABLET | Refills: 0 | Status: SHIPPED | OUTPATIENT
Start: 2024-11-25 | End: 2024-12-09

## 2024-12-02 DIAGNOSIS — R10.2 PELVIC PAIN: Primary | ICD-10-CM

## 2024-12-09 ENCOUNTER — OFFICE VISIT (OUTPATIENT)
Dept: FAMILY MEDICINE CLINIC | Facility: CLINIC | Age: 39
End: 2024-12-09
Payer: COMMERCIAL

## 2024-12-09 VITALS
WEIGHT: 164 LBS | TEMPERATURE: 98.3 F | SYSTOLIC BLOOD PRESSURE: 118 MMHG | HEART RATE: 101 BPM | HEIGHT: 61 IN | OXYGEN SATURATION: 97 % | DIASTOLIC BLOOD PRESSURE: 72 MMHG | BODY MASS INDEX: 30.96 KG/M2 | RESPIRATION RATE: 16 BRPM

## 2024-12-09 DIAGNOSIS — M54.41 CHRONIC BILATERAL LOW BACK PAIN WITH BILATERAL SCIATICA: ICD-10-CM

## 2024-12-09 DIAGNOSIS — M54.42 CHRONIC BILATERAL LOW BACK PAIN WITH BILATERAL SCIATICA: ICD-10-CM

## 2024-12-09 DIAGNOSIS — M25.562 CHRONIC PAIN OF BOTH KNEES: ICD-10-CM

## 2024-12-09 DIAGNOSIS — M25.552 BILATERAL HIP PAIN: Primary | ICD-10-CM

## 2024-12-09 DIAGNOSIS — G89.29 CHRONIC BILATERAL LOW BACK PAIN WITH BILATERAL SCIATICA: ICD-10-CM

## 2024-12-09 DIAGNOSIS — M25.561 CHRONIC PAIN OF BOTH KNEES: ICD-10-CM

## 2024-12-09 DIAGNOSIS — M25.551 BILATERAL HIP PAIN: Primary | ICD-10-CM

## 2024-12-09 DIAGNOSIS — G89.29 CHRONIC PAIN OF BOTH KNEES: ICD-10-CM

## 2024-12-09 PROCEDURE — 99214 OFFICE O/P EST MOD 30 MIN: CPT | Performed by: FAMILY MEDICINE

## 2024-12-09 RX ORDER — MELOXICAM 15 MG/1
15 TABLET ORAL DAILY PRN
Qty: 30 TABLET | Refills: 3 | Status: SHIPPED | OUTPATIENT
Start: 2024-12-09

## 2024-12-09 NOTE — ASSESSMENT & PLAN NOTE
Xrays ok knees nl will try meloxicam     Orders:    meloxicam (MOBIC) 15 mg tablet; Take 1 tablet (15 mg total) by mouth daily as needed for moderate pain

## 2024-12-09 NOTE — ASSESSMENT & PLAN NOTE
Will get xrays    Orders:    XR hips bilateral 3-4 vw w pelvis if performed; Future    Ambulatory Referral to Physical Therapy; Future    meloxicam (MOBIC) 15 mg tablet; Take 1 tablet (15 mg total) by mouth daily as needed for moderate pain

## 2024-12-09 NOTE — ASSESSMENT & PLAN NOTE
Chronic pain will get xrays and start meloxicam   Orders:    XR spine lumbar minimum 4 views non injury; Future    Ambulatory Referral to Physical Therapy; Future    meloxicam (MOBIC) 15 mg tablet; Take 1 tablet (15 mg total) by mouth daily as needed for moderate pain

## 2024-12-09 NOTE — PROGRESS NOTES
Name: Elis Patterson      : 1985      MRN: [id number]  Encounter Provider: Korin Caballero MD  Encounter Date: 2024   Encounter department: [facility]  :  Assessment & Plan  Bilateral hip pain  Will get xrays    Orders:    XR hips bilateral 3-4 vw w pelvis if performed; Future    Ambulatory Referral to Physical Therapy; Future    meloxicam (MOBIC) 15 mg tablet; Take 1 tablet (15 mg total) by mouth daily as needed for moderate pain    Chronic pain of both knees  Xrays ok knees nl will try meloxicam     Orders:    meloxicam (MOBIC) 15 mg tablet; Take 1 tablet (15 mg total) by mouth daily as needed for moderate pain    Chronic bilateral low back pain with bilateral sciatica  Chronic pain will get xrays and start meloxicam   Orders:    XR spine lumbar minimum 4 views non injury; Future    Ambulatory Referral to Physical Therapy; Future    meloxicam (MOBIC) 15 mg tablet; Take 1 tablet (15 mg total) by mouth daily as needed for moderate pain           History of Present Illness     Pt with chronic low back pain  and bilateral hip and bilateral knee pain for many yrs  using tyelnol with no effect  pain in low back radiates to hips     Back Pain  This is a recurrent problem. The current episode started more than 1 year ago. The problem occurs constantly. The problem has been rapidly worsening since onset. The pain is present in the lumbar spine and sacro-iliac. The quality of the pain is described as aching. The pain radiates to the left foot, left knee, left thigh, right foot, right knee and right thigh. The pain is at a severity of 8/10. The pain is The same all the time. The symptoms are aggravated by position and twisting. Stiffness is present In the morning, at night and all day. Associated symptoms include leg pain, paresthesias and tingling. Pertinent negatives include no abdominal pain, bladder incontinence, bowel incontinence, chest pain, dysuria, fever, headaches,  "numbness, paresis, pelvic pain, perianal numbness, weakness or weight loss.     Review of Systems   Constitutional:  Negative for fever and weight loss.   Cardiovascular:  Negative for chest pain.   Gastrointestinal:  Negative for abdominal pain and bowel incontinence.   Genitourinary:  Negative for bladder incontinence, dysuria and pelvic pain.   Musculoskeletal:  Positive for arthralgias (hips and knees) and back pain.   Neurological:  Positive for tingling and paresthesias. Negative for weakness, numbness and headaches.          Objective   /72 (BP Location: Left arm, Patient Position: Sitting, Cuff Size: Standard)   Pulse 101   Temp 98.3 °F (36.8 °C) (Tympanic)   Resp 16   Ht 5' 1\" (1.549 m)   Wt 74.4 kg (164 lb)   LMP 09/26/2023   SpO2 97%   BMI 30.99 kg/m²      Physical Exam  Constitutional:       General: She is not in acute distress.     Appearance: Normal appearance. She is well-developed.   HENT:      Head: Normocephalic.   Pulmonary:      Effort: Pulmonary effort is normal. No respiratory distress.      Breath sounds: Normal breath sounds.   Musculoskeletal:         General: Tenderness (midline lumbar tenderness left sciatic notch tenderness ; bilateral hip joint tenderness FROM of hips) present.   Neurological:      General: No focal deficit present.      Mental Status: She is alert and oriented to person, place, and time. Mental status is at baseline.      Motor: No weakness.      Gait: Gait normal.   Psychiatric:         Behavior: Behavior normal.         Thought Content: Thought content normal.         Judgment: Judgment normal.         "

## 2024-12-19 ENCOUNTER — EVALUATION (OUTPATIENT)
Dept: PHYSICAL THERAPY | Facility: CLINIC | Age: 39
End: 2024-12-19
Payer: COMMERCIAL

## 2024-12-19 DIAGNOSIS — G89.29 CHRONIC BILATERAL LOW BACK PAIN WITH BILATERAL SCIATICA: Primary | ICD-10-CM

## 2024-12-19 DIAGNOSIS — M54.42 CHRONIC BILATERAL LOW BACK PAIN WITH BILATERAL SCIATICA: Primary | ICD-10-CM

## 2024-12-19 DIAGNOSIS — M54.41 CHRONIC BILATERAL LOW BACK PAIN WITH BILATERAL SCIATICA: Primary | ICD-10-CM

## 2024-12-19 DIAGNOSIS — M25.552 BILATERAL HIP PAIN: ICD-10-CM

## 2024-12-19 DIAGNOSIS — M25.551 BILATERAL HIP PAIN: ICD-10-CM

## 2024-12-19 PROCEDURE — 97161 PT EVAL LOW COMPLEX 20 MIN: CPT

## 2024-12-19 PROCEDURE — 97110 THERAPEUTIC EXERCISES: CPT

## 2024-12-19 NOTE — PROGRESS NOTES
PT Evaluation     Today's date: 2024  Patient name: Elis Patterson  : 1985  MRN: [id number]  Referring provider: Korin Caballero MD  Dx:   Encounter Diagnosis     ICD-10-CM    1. Chronic bilateral low back pain with bilateral sciatica  M54.42     M54.41     G89.29       2. Bilateral hip pain  M25.551     M25.552           Start Time: 1608  Stop Time: 1653  Total time in clinic (min): 45 minutes    Assessment  Impairments: abnormal gait, abnormal muscle tone, abnormal or restricted ROM, abnormal movement, activity intolerance, impaired balance, impaired physical strength, lacks appropriate home exercise program, pain with function, participation limitations and activity limitations  Symptom irritability: high    Assessment details: The patient is a 39 y.o. Female who reports to OP PT for evaluation and treatment of low back, B hip, B thigh, B knee, B leg, and B ankle pain L>R. Significant PMH includes hysterectomy approx 6 months ago. Neurological screen negative for upper motor neuron lesion (UMNL) and no subjective reporting suggestive of UMNL. Examination findings reveal lumbar spine AROM WNL in all directions, but concordant pain in all directions, impaired hip, knee, and ankle strength L>R, and positive slump test bilaterally. No change in symptoms with repeated lumbar flexion, extension, and rotation. Lumbar>thoracic paraspinals tender to palpation L>R. Pt presents with chronic pain presentation. Provided pt with HEP of sciatic nerve glide and hip internal rotation stretch, which she performed in clinic with good form. All treatments tolerated well, no adverse reaction. Pt works as a  and must bend forward and lift up to 25 pounds; unable due to impairments.  Skilled PT services are indicated in order to address impairments such that pt can perform ADLs and participate in functional activities in the community.    Understanding of Dx/Px/POC: fair     Prognosis: fair    Goals  Short  term (1-3 weeks)  1. Pt will demo consistent and independent performance of progressive HEP.  2. Pt will demo 1/3 or greater improvement in hip flexion MMT.  Long term (4-8 weeks)  1. Pt will demo 4+ or greater hip flexion MMT bilaterally.  2. Pt will demo 2/10 or lower low back pain with forward bending.  3. Pt will demo negative slump test bilaterally.  4. Pt will demo ability to lift 25lbs from floor to waist level.      Plan  Patient would benefit from: skilled physical therapy    Planned therapy interventions: abdominal trunk stabilization, joint mobilization, manual therapy, neuromuscular re-education, breathing training, strengthening, flexibility, stretching, functional ROM exercises, therapeutic activities, gait training, therapeutic exercise and home exercise program    Frequency: 1-2x week  Duration in weeks: 10  Plan of Care beginning date: 12/19/2024  Plan of Care expiration date: 2/27/2025  Treatment plan discussed with: patient      Subjective Evaluation    History of Present Illness  Mechanism of injury: Patient is a 39 y.o. Female who is referred to OP PT for evaluation and treatment of Bilateral hip pain and Chronic bilateral low back pain with bilateral sciatica. Reports that she has had the pain for a few the past few years, but pain has been worsening over the past month. Describes low back pain as sharp, pins/needles, and stabbing. Pain is located on midline lumbar spine to SIJ. B hip pain is described as achy L>R. Hip pain is located around greater trochanter. B knee pain is described sharp L>R. Knee pain is located around patella. Pt also reports pain in medial and lateral ankles L>R, described as dull/achy. Denies numbness/tinging down LEs.  Aggravating factors include cold, rainy weather, ascending/descending stairs, bending forward.  Easing factors include pain medicine.  Pt is going to be receiving XR soon.  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  Past surgical history - hysterectomy (approx 6 months ago).  Denies unexpected weight change, fever/sweats/chills, bowel/bladder changes, saddle paresthesia.  Endorses night pain.    Pain levels:  Back - C: 7/10, W: 10/10, B: 2/10  Hips - C: 3/10, W: 10/10, B: 2/10  Knees - 3/10, W: 10/10, B: 2/10  Ankles - 5/10, W: 10/10, B: 2/10    Patient Goals  Patient goals for therapy: increased strength, decreased pain, increased motion and return to sport/leisure activities        Objective    Reflexes R L   L2-L4 (patellar) 1+ 1+   S1 (ankle jerk 1+ 1+   Clonus (-)              (-)      AROM R L   Lumbar flexion WNL   Lumbar extension WNL   Lumbar side bend WNL WNL   Lumbar rotation WNL WNL     AROM R L   Hip flexion 120 100   Hip extension     Hip abduction     Hip adduction     Hip ER 50 30   Hip IR 30 45   Knee flexion WFL WFL   Knee extension 2 deg hyper 2 deg hyper   Ankle DF WFL WFL   Ankle PF WFL WFL   Ankle inversion     Ankle eversion       MMT R L   Hip flexion 4 3+   Hip extension     Hip abduction     Hip adduction     Hip ER     Hip IR     Knee flexion 4 4-   Knee extension 4 4-   Ankle DF 4 4-   Ankle PF     Ankle inversion     Ankle eversion       Repeated lumbar ext: no change  Repeated lumbar flex: no change  Repeated rotation: no change L and R    Slump test: (+) bilaterally    Straight leg raise: unable to assess due to muscle guarding    5x STS: 10 sec    Palpation:  TTP along lumbar>thoracic paraspinals  Unable to assess CPA and UPA due to pain irritability and severity     Precautions: none    [email or web address]  Access Code: [email or web address]  Sciatic nerve glide  Seated hip IR stretch    Manuals 12/19                                                                Neuro Re-Ed             Sciatic nerve glide X10 B                                                                                          Ther Ex             Seated hip IR stretch X10 ea            Seated  figure 4 stretch X30sec ea                                                                                          Ther Activity                                       Gait Training                                       Modalities

## 2024-12-20 ENCOUNTER — HOSPITAL ENCOUNTER (OUTPATIENT)
Dept: RADIOLOGY | Facility: HOSPITAL | Age: 39
End: 2024-12-20
Payer: COMMERCIAL

## 2024-12-20 ENCOUNTER — OFFICE VISIT (OUTPATIENT)
Dept: OBGYN CLINIC | Facility: CLINIC | Age: 39
End: 2024-12-20
Payer: COMMERCIAL

## 2024-12-20 VITALS — HEIGHT: 61 IN | DIASTOLIC BLOOD PRESSURE: 66 MMHG | SYSTOLIC BLOOD PRESSURE: 122 MMHG | BODY MASS INDEX: 30.99 KG/M2

## 2024-12-20 DIAGNOSIS — N30.10 PAIN DUE TO INTERSTITIAL CYSTITIS: ICD-10-CM

## 2024-12-20 DIAGNOSIS — N30.10 PAIN DUE TO INTERSTITIAL CYSTITIS: Primary | ICD-10-CM

## 2024-12-20 DIAGNOSIS — G89.29 CHRONIC BILATERAL LOW BACK PAIN WITH BILATERAL SCIATICA: ICD-10-CM

## 2024-12-20 DIAGNOSIS — M54.41 CHRONIC BILATERAL LOW BACK PAIN WITH BILATERAL SCIATICA: ICD-10-CM

## 2024-12-20 DIAGNOSIS — M54.42 CHRONIC BILATERAL LOW BACK PAIN WITH BILATERAL SCIATICA: ICD-10-CM

## 2024-12-20 DIAGNOSIS — M25.551 BILATERAL HIP PAIN: ICD-10-CM

## 2024-12-20 DIAGNOSIS — M25.552 BILATERAL HIP PAIN: ICD-10-CM

## 2024-12-20 PROCEDURE — 72110 X-RAY EXAM L-2 SPINE 4/>VWS: CPT

## 2024-12-20 PROCEDURE — 73522 X-RAY EXAM HIPS BI 3-4 VIEWS: CPT

## 2024-12-20 PROCEDURE — 99213 OFFICE O/P EST LOW 20 MIN: CPT | Performed by: OBSTETRICS & GYNECOLOGY

## 2024-12-20 RX ORDER — LIDOCAINE HYDROCHLORIDE 20 MG/ML
JELLY TOPICAL AS NEEDED
Qty: 5 ML | Refills: 3 | Status: SHIPPED | OUTPATIENT
Start: 2024-12-20

## 2024-12-20 RX ORDER — HEPARIN SODIUM 20000 [USP'U]/ML
INJECTION INTRAVENOUS; SUBCUTANEOUS
Qty: 2 ML | Refills: 3 | Status: SHIPPED | OUTPATIENT
Start: 2024-12-20

## 2024-12-20 NOTE — PROGRESS NOTES
Assessment/Plan:     There are no diagnoses linked to this encounter.      39-year-old female  Lower pelvic pain/urgency frequency  Diverticulosis  Smoker  Had robotic TLH BS secondary to adenomyosis  History of trichomoniasis  Plan  Different etiology of pelvic pain reviewed and discussed with patient  To follow-up with GI  To follow-up with bladder diet  Symptoms suggestive of interstitial cystitis return to office for bladder instillation procedure reviewed and discussed with patient all patient questions answered and patient was satisfied      Subjective:      Patient ID: Elis Patterson is a 39 y.o. female.  39-year-old female presents to the office today to discuss management option of her lower pelvic pain  /Urgency and frequency  Patient has a hysterectomy and she has no pain since then to last year  Patient pain started to affect the quality of life  Patient symptoms highly suggestive of interstitial cystitis  Bladder diet reviewed and discussed with patient  Patient also  informed him about alternative on add on management for her pain patient is interested in trial of bladder instillation procedure explained and discussed with patient risk-benefit side effect reviewed discussed with patient all patient questions answered and patient was satisfied  Pelvic Pain  The patient's primary symptoms include pelvic pain. This is a chronic problem. Episode onset: 6m. The problem occurs daily. The problem has been unchanged. The pain is moderate. The problem affects both sides. She is not pregnant. Associated symptoms include abdominal pain, back pain and urgency. Pertinent negatives include no constipation, diarrhea, dysuria, frequency or painful intercourse. Nothing aggravates the symptoms. She has tried acetaminophen for the symptoms. The treatment provided mild relief. She is not sexually active. She uses hysterectomy for contraception.       The following portions of the patient's history were reviewed and  "updated as appropriate: allergies, current medications, past family history, past medical history, past social history, past surgical history and problem list.    Review of Systems   Gastrointestinal:  Positive for abdominal pain. Negative for constipation and diarrhea.   Genitourinary:  Positive for pelvic pain and urgency. Negative for dysuria and frequency.   Musculoskeletal:  Positive for back pain.         Objective:      /66 (BP Location: Left arm, Patient Position: Sitting, Cuff Size: Adult)   Ht 5' 1\" (1.549 m)   LMP 09/26/2023   BMI 30.99 kg/m²          Physical Exam  Constitutional:       Appearance: Normal appearance.   Neurological:      General: No focal deficit present.      Mental Status: She is alert and oriented to person, place, and time.   Psychiatric:         Mood and Affect: Mood normal.         Behavior: Behavior normal.         "

## 2024-12-22 NOTE — TELEPHONE ENCOUNTER
Can you check other pharmacy  Or can you ask pharmacy if  THEY HAVE 40 000 UNIT/ML VIAL instead of 2 (20 000)  Or they can give her 4 of the 10 000 vial

## 2024-12-23 ENCOUNTER — OFFICE VISIT (OUTPATIENT)
Dept: PHYSICAL THERAPY | Facility: CLINIC | Age: 39
End: 2024-12-23
Payer: COMMERCIAL

## 2024-12-23 ENCOUNTER — RESULTS FOLLOW-UP (OUTPATIENT)
Dept: FAMILY MEDICINE CLINIC | Facility: CLINIC | Age: 39
End: 2024-12-23

## 2024-12-23 DIAGNOSIS — M54.41 CHRONIC BILATERAL LOW BACK PAIN WITH BILATERAL SCIATICA: Primary | ICD-10-CM

## 2024-12-23 DIAGNOSIS — M25.551 BILATERAL HIP PAIN: ICD-10-CM

## 2024-12-23 DIAGNOSIS — G89.29 CHRONIC BILATERAL LOW BACK PAIN WITH BILATERAL SCIATICA: Primary | ICD-10-CM

## 2024-12-23 DIAGNOSIS — M54.42 CHRONIC BILATERAL LOW BACK PAIN WITH BILATERAL SCIATICA: Primary | ICD-10-CM

## 2024-12-23 DIAGNOSIS — M25.552 BILATERAL HIP PAIN: ICD-10-CM

## 2024-12-23 PROCEDURE — 97530 THERAPEUTIC ACTIVITIES: CPT

## 2024-12-23 PROCEDURE — 97110 THERAPEUTIC EXERCISES: CPT

## 2024-12-23 PROCEDURE — 97010 HOT OR COLD PACKS THERAPY: CPT

## 2024-12-23 NOTE — PROGRESS NOTES
Daily Note     Today's date: 2024  Patient name: Elis Patterson  : 1985  MRN: [id number]  Referring provider: Korin Caballero MD  Dx:   Encounter Diagnosis     ICD-10-CM    1. Chronic bilateral low back pain with bilateral sciatica  M54.42     M54.41     G89.29       2. Bilateral hip pain  M25.551     M25.552           Start Time: 0806  Stop Time: 0832  Total time in clinic (min): 26 minutes    Subjective: Pt reports 8/10 pain yesterday and 5/10 today. Reports that she did not do anything out of the ordinary yesterday that would have increased pain intensity.      Objective: See treatment diary below      Assessment: Treatment today was focused on thoracolumbar mobility and lower quarter strengthening. Pt reported no increase in pain during and following treatment. Plan to progress strengthening interventions. All treatments tolerated well, no adverse reaction.      Plan: Continue per plan of care.      Precautions: none    [email or web address]  Access Code: [email or web address]  Sciatic nerve glide  Seated hip IR stretch    Manuals                                                                Neuro Re-Ed             Sciatic nerve glide X10 B X10 B                                                                                         Ther Ex             Seated hip IR stretch X10 ea X10 ea           Seated figure 4 stretch X30sec ea            LTR  [id number] B           Knee to chest stretch  [id number] B           Open book  X15 B                                                  Ther Activity             Total gym squat  Lvl14  3x15                        Gait Training                                       Modalities             Moist heat  3 min

## 2024-12-26 ENCOUNTER — TELEPHONE (OUTPATIENT)
Age: 39
End: 2024-12-26

## 2024-12-26 NOTE — TELEPHONE ENCOUNTER
Caller: Pt    Doctor: Dr Kaplan    Reason for call: Pt called in to clarify denied referral. Was able to speak to reviewer and confirm pt to be seen for back pain.    Changed referral to open.       Call back#: [phone]

## 2025-01-02 ENCOUNTER — APPOINTMENT (OUTPATIENT)
Dept: PHYSICAL THERAPY | Facility: REHABILITATION | Age: 40
End: 2025-01-02
Payer: COMMERCIAL

## 2025-01-03 ENCOUNTER — OFFICE VISIT (OUTPATIENT)
Dept: PHYSICAL THERAPY | Facility: CLINIC | Age: 40
End: 2025-01-03
Payer: COMMERCIAL

## 2025-01-03 DIAGNOSIS — M54.42 CHRONIC BILATERAL LOW BACK PAIN WITH BILATERAL SCIATICA: Primary | ICD-10-CM

## 2025-01-03 DIAGNOSIS — G89.29 CHRONIC BILATERAL LOW BACK PAIN WITH BILATERAL SCIATICA: Primary | ICD-10-CM

## 2025-01-03 DIAGNOSIS — M25.552 BILATERAL HIP PAIN: ICD-10-CM

## 2025-01-03 DIAGNOSIS — M54.41 CHRONIC BILATERAL LOW BACK PAIN WITH BILATERAL SCIATICA: Primary | ICD-10-CM

## 2025-01-03 DIAGNOSIS — M25.551 BILATERAL HIP PAIN: ICD-10-CM

## 2025-01-03 PROCEDURE — 97110 THERAPEUTIC EXERCISES: CPT

## 2025-01-03 PROCEDURE — 97530 THERAPEUTIC ACTIVITIES: CPT

## 2025-01-03 NOTE — PROGRESS NOTES
Daily Note     Today's date: 1/3/2025  Patient name: Elis Patterson  : 1985  MRN: [id number]  Referring provider: Korin Caballero MD  Dx:   Encounter Diagnosis     ICD-10-CM    1. Chronic bilateral low back pain with bilateral sciatica  M54.42     M54.41     G89.29       2. Bilateral hip pain  M25.551     M25.552             Start Time: 0805  Stop Time: 08  Total time in clinic (min): 33 minutes    Subjective: Pt reports 4/10 currently (located in low back only), 7/10 at worst in the past week. Reports soreness that lasts approximately 15 minutes after performing HEP. Reports that laying down and sitting make pain worse, pain is lower when she is standing.       Objective: See treatment diary below      Assessment: Pt reported centralization of pain to the tailbone following repeated lumbar extension. Added this intervention to HEP. Pt was challenged with leg press at 65 pounds, further indicating strength impairment of LEs. All treatments tolerated well, no adverse reaction.      Plan: Continue per plan of care.      Precautions: none    [email or web address]  Access Code: [email or web address]  Sciatic nerve glide  Seated hip IR stretch  Standing lumbar extension  Standing hip extension    Manuals 12/19 12/23 1/3                                                              Neuro Re-Ed             Sciatic nerve glide X10 B X10 B                                                                                         Ther Ex             Seated hip IR stretch X10 ea X10 ea           Seated figure 4 stretch X30sec ea            LTR  [id number] B           Knee to chest stretch  [id number] B           Open book  X15 B X15 B          Prone on elbows   x1min          Prone lying   x1min;  2x2min          Standing repeated lumbar ext   2x10          Standing hip ext   X15 B          Ther Activity             Total gym squat  Lvl14  3x15           Leg press   60# x15  65# x22          Gait Training                                        Modalities             Moist heat  3 min

## 2025-01-06 ENCOUNTER — OFFICE VISIT (OUTPATIENT)
Dept: PHYSICAL THERAPY | Facility: CLINIC | Age: 40
End: 2025-01-06
Payer: COMMERCIAL

## 2025-01-06 DIAGNOSIS — G89.29 CHRONIC BILATERAL LOW BACK PAIN WITH BILATERAL SCIATICA: Primary | ICD-10-CM

## 2025-01-06 DIAGNOSIS — M54.42 CHRONIC BILATERAL LOW BACK PAIN WITH BILATERAL SCIATICA: Primary | ICD-10-CM

## 2025-01-06 DIAGNOSIS — M54.41 CHRONIC BILATERAL LOW BACK PAIN WITH BILATERAL SCIATICA: Primary | ICD-10-CM

## 2025-01-06 PROCEDURE — 97110 THERAPEUTIC EXERCISES: CPT

## 2025-01-06 PROCEDURE — 97112 NEUROMUSCULAR REEDUCATION: CPT

## 2025-01-06 NOTE — PROGRESS NOTES
Daily Note     Today's date: 2025  Patient name: Elis Patterson  : 1985  MRN: [id number]  Referring provider: Korin Caballero MD  Dx:   Encounter Diagnosis     ICD-10-CM    1. Chronic bilateral low back pain with bilateral sciatica  M54.42     M54.41     G89.29               Start Time: 0800  Stop Time: 0836  Total time in clinic (min): 36 minutes    Subjective: Pt reports 4/10 low back pain today. Reports no hip and leg pain, however anterior knee pain is present. Pt is unable to identify aggravating factors for knee pain. Reports that she performed approximately 15 minutes of HEP per day.       Objective: See treatment diary below      Assessment: As directional preference is inconsistent, plan to direct focus tp stabilization interventions. Pt reported no aggravation of pain with all interventions today; reported appropriate activation of core muscles with PPT and paloff press. Added paloff press and PPT + march to HEP. All treatments tolerated well, no adverse reaction.      Plan: Continue per plan of care.      Precautions: none    [email or web address]  Access Code: [email or web address]  Sciatic nerve glide  Seated hip IR stretch  Standing lumbar extension  Standing hip extension  Standing Paloff press  PPT + march    Manuals 12/19 12/23 1/3 1/6                                                             Neuro Re-Ed             Sciatic nerve glide X10 B X10 B           PPT    x10         PPT + 3 breaths    x10         PPT + march    x10         Paloff press    Standing  2x10 B                                   Ther Ex             Seated hip IR stretch X10 ea X10 ea           Seated figure 4 stretch X30sec ea            LTR  [id number] B           Knee to chest stretch  [id number] B           Open book  X15 B X15 B          Prone on elbows   x1min          Prone lying   x1min;  2x2min x1min         Standing repeated lumbar ext   2x10 x10         Standing hip ext   X15 B          Glute bridge    2x10          Ther Activity             Total gym squat  Lvl14  3x15           Leg press   60# x15  65# x22          Gait Training                                       Modalities             Moist heat  3 min

## 2025-01-09 ENCOUNTER — EVALUATION (OUTPATIENT)
Dept: PHYSICAL THERAPY | Facility: REHABILITATION | Age: 40
End: 2025-01-09
Payer: COMMERCIAL

## 2025-01-09 DIAGNOSIS — R10.2 PELVIC PAIN: Primary | ICD-10-CM

## 2025-01-09 PROCEDURE — 97530 THERAPEUTIC ACTIVITIES: CPT | Performed by: PHYSICAL THERAPIST

## 2025-01-09 PROCEDURE — 97162 PT EVAL MOD COMPLEX 30 MIN: CPT | Performed by: PHYSICAL THERAPIST

## 2025-01-09 NOTE — PROGRESS NOTES
PT Evaluation     Today's date: 2025  Patient name: Elis Patterson  : 1985  MRN: [id number]  Referring provider: Brett Everett MD  Dx:   Encounter Diagnosis     ICD-10-CM    1. Pelvic pain  R10.2                      Assessment  Impairments: abnormal coordination, abnormal or restricted ROM, activity intolerance, impaired physical strength, lacks appropriate home exercise program, pain with function, poor posture  and poor body mechanics    Assessment details: The patient is a 39 y.o. female with complaints of pelvic pain for the past 6 months or so. Her history includes a total hysterectomy (ovary sparing) in 2024 due to adenomyosis and fibroids. Her menstrual history includes heavy periods. She presents with some tenderness in the suprapubic region and over the pubic symphysis, as well as some generalized tenderness throughout the pelvic floor R > L. She does demonstrate some tightness and tension in the pelvic floor, right > left. She also has weakness of her pelvic floor muscles as well, possibly due to hypertonicity. She would benefit from pelvic floor physical therapy to help reduce/manage pain and symptoms, address impairments and maximize function and quality of life upon discharge. She will be given updated HEP throughout episode of care. She is also scheduled for bladder instillations on  with Dr. Everett. Thank you for the referral.    Therapeutic activities performed upon examination included education regarding pelvic floor anatomy, explanation of exam technique, explanation of exam findings and discussion of treatment plan as well as expectations of the patient to emphasize the importance of compliance and adherence to physical therapy visits.               Understanding of Dx/Px/POC: good    Goals    PAIN    ST. The patient will reduce pelvic floor muscle tone by 25 to 50%  in 12 weeks.   2. The patient will improve pelvic floor muscle strength by 1 grade with improved  SKYLAR Diabetes Education Progress Note    Reason for Visit: Initial Diabetes Education.    The patient was seen for Diabetes Self-Management Education in an individual setting for diagnosis of   Diagnosis Information      Diagnosis    250.00 (ICD-9-CM) - E11.65 (ICD-10-CM) - Type 2 diabetes mellitus with hyperglycemia, unspecified whether long term insulin use (CMS/[facility])             The patient was seen from 1045 to 1055. Pt was not billed for today's appointment. Relevant medical history reviewed.  The instruction was given to the patient.    Highlights of today's visit:  Pt newly diagnosed with type 2 diabetes, most recent hgbA1c 6.6% on 10/21/22. Seeing Endocrinology for medication management.  Pt reported intense pain in his side at the start of the appointment. He requested to go to the emergency room to be evaluated. Writer took pt to ED via wheelchair.     Current Medication and Medication Update/Changes:  Metformin XR 750mg daily    Assessment of Blood Sugars:   Did not assess.    Material was presented using verbal, written, demonstration and return demonstration.    Learning needs were assessed at initial visit and the patient requires education in diabetes disease process/treatment options, medical nutrition therapy, physical activity, blood glucose monitoring, diabetes medications, acute complications, chronic complications, diabetes psychosocial adjustment and strategies and goal setting.     Labs:  Hemoglobin A1C: target value and patient current value reviewed   No results found for: [id number]  Hemoglobin A1C (%)   Date Value   10/21/2022 6.6 (H)       Lipid panel:  target value and patient current value reviewed   Cholesterol (mg/dL)   Date Value   10/21/2022 133     LDL (mg/dL)   Date Value   10/21/2022 78     HDL (mg/dL)   Date Value   10/21/2022 31 (L)     Triglycerides (mg/dL)   Date Value   10/21/2022 118       Microalbumin:  target value and patient current value reviewed No results found for:  MAR    Creatinine:  target value and patient current value reviewed   Creatinine (mg/dL)   Date Value   06/03/2022 0.53 (L)     Anthropometric Measurements:  Estimated body mass index is 37.73 kg/m² as calculated from the following:    Height as of this encounter: 5' 5\" (1.651 m).    Weight as of 12/15/22: 102.9 kg (226 lb 11.9 oz).   Wt Readings from Last 2 Encounters:   12/15/22 102.9 kg (226 lb 11.9 oz)   11/28/22 105 kg (231 lb 6.4 oz)       Physical Activity - Incorporating Activity into Lifestyle :  Did not assess    Food and Nutrition Related History:  Did not assess    Plan:  Chart routed to referring Provider.  Goal Setting to Promote Health & Problem Solving for Daily Living was discussed.  See DM Care Plan for goals and topics covered.  See Patient Instructions for further information.     Order:  DSMT Order Expires: 12/14/23  Order located in EMR.  Billing Provider Aldair Hopson MD  Referring Provider: Jomar Bey*  Service Provider: Berkley Montgomery RD     Recommended Follow-up:  Pt to follow up with DM education pt to call to reschedule.  The patient was encouraged to call back with any questions or concerns.    Assessments :  Verify assessment form is up to date: 1/26/23  Diabetes Self Management Education Assessment    Learning Style  Learning style preference:  looking at diagrams, charts, or graphs, reading pamphlets, books, or articles, question and answer, talk, group discussion, or guest speakers and demonstrations or models  Reading health information can be difficult. Occasionally  How often do you have a problem understanding what is told to you about your medical condition? Sometimes    Health History  Do you know what type of diabetes you have?  No  Does anyone in your family have diabetes? Yes  Have you had diabetes education before? No    Nutrition / Meal Planning  Do you eat three meals a day?  No  Do you eat at about the same time each day?  No  Do have diet limitations?  coordination in 12 weeks.   3. The patient will demonstrate good posture with sitting on toilet to promote proper pelvic floor muscle lengthening.   4. The patient will demonstrate good isolation of respiratory diaphragm and good coordination of diaphragm and transverse abdominis in 8 to 10 weeks.     LT. The patient will be compliant with exercises and self care program to independently manage symptoms upon discharge.   2. The patient will minimize straining for evacuation of bowels upon discharge.   3. The patient will have minimal to no pelvic pain upon discharge.  4. The patient will demonstrate improved bladder voiding and emptying of her bowels upon discharge.       Plan  Patient would benefit from: skilled physical therapy  Planned modality interventions: biofeedback    Planned therapy interventions: activity modification, abdominal trunk stabilization, behavior modification, body mechanics training, breathing training, coordination, flexibility, functional ROM exercises, IADL retraining, manual therapy, neuromuscular re-education, patient education, postural training, self care, strengthening, therapeutic activities and therapeutic exercise    Frequency: 1x week  Duration in weeks: 12  Plan of Care beginning date: 2025  Plan of Care expiration date: 4/3/2025  Treatment plan discussed with: patient      PT Pelvic Floor Subjective:   History of Present Illness:   The patient notes that she is experiencing ongoing pelvic pain. She notes that after her hysterectomy, she started to notice pelvic pain. She describes the pain as vaginal and abdominal. She experiences pain if she has to urinate or empty her bowels. She does not wake up in the middle of the night, and when she wakes up in the morning her bladder is full and this causes her pain. She does have pain when she empties, but then relief 15 minutes after emptying. She describes the pain as achy and sharp. She saw Dr. Everett recently and she  (low salt, no dairy products, no wheat products)  No  Do you have any weight or eating concerns? Yes  Are you trying to lose weight; currently following a diet/meal plan? No  Is it hard to control what you eat? Yes  Do you drink regular soda or fruit drinks (orange juice, fruit punch)?  Yes  Do you eat desserts/sweets more than once or twice a week?  No  Do you drink alcohol? No     Do you \"eat out\" or get \"carry out\" more than once per week? No  Do you do your own cooking? Yes  Do you do your own grocery shopping?  Yes    Activity  Do you exercise at least 30 minutes/3 or more times per week?  No  If yes, what type of activity do you do?  n/a  Is there a medical reason for limiting activity?  No    Monitoring  Do you have a blood sugar monitor? No  How many times per day are you testing? Not at all  Do you check your blood sugar level before driving? No  Do you have a low blood sugar more than 1 time per week? unsure  In the past 12 months, have you had a high or low blood sugar that required treatment help from another person or hospitalization?  No  Do you have a sharps container?  No    Medication  Do you miss or forget to take your pills or insulin?  No  Do you ever omit your pills or insulin on purpose? No  Do you carry a current medication list or card in your wallet?  No  Do you wear diabetes identification?  No  Do you carry a form of fast-acting sugar with you?  No    Complications/Problems  Do you have numbness, tingling, sores, or pain in your hands or feet?  No  Do you check your feet daily for any signs of problems?  No  Have you had a dilated eye exam in the past year?  No  Have you seen a dentist in the past year?  Yes  Have you had a change or loss in hearing?  No  Do you sleep 6-8 hours per night?  Yes  Do you have sleep apnea?  unsure    Socioeconomic  Do you live alone?  No  Do you feel safe in relationships at home?  Yes  Do you work outside the home?  No  Do you work second or third shift?  recommended pelvic floor therapy and bladder instillations. She feels that her symptoms are consistent with interstitial cystitis. She is scheduled later this month to follow up with Dr. Everett and receive her first bladder instillation.     She has also been attending PT for her lower back and legs at the [facility]. Mechanism of injury: surgery    Total Hysterectomy: 6/28/24  Social Support:     Lives with: lives at home with mom, brother, and sister.    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)    History of Depression: yes  Diet and Exercise:    Diet:balanced nutrition  OB/ gyn History    Gestational History:     Prior Pregnancy: No      Menstrual History:      Birth control: no contraception  Can feel cramping and ovulation symptoms  Hysterectomy: 6/28/24: total hysterectomy   Periods were very irregular and extremely heavy - she notes that they were like this for a long time  She did try oral BC and Depo injections to help but this did not help at all with her bleeding  Bladder Function:     Voiding Difficulties positive for: urgency (sometimes), hesitancy (takes time to start her stream) and incomplete emptying (sometimes)      Voiding Difficulties negative for: frequent urination and straining       Voiding Difficulties comments:     Voiding frequency: every 3-4 hours    Urinary leakage: no urine leakage    Nocturia (episodes per night): 0    Painful urination: Yes      Fluid Intake Type:  Water    Intake (ounces):     Intake (ounces) comment: Water:  ounces  1 cup of coffee a day  Soda: A few cans a week, not every day  Bowel Function:     Voiding DIfficulties: painful defecating, unfinished feeling after defecating and constipation      Bowel Function comments:  Strain to go and painful to pass    Bowel frequency: daily    [place] Stool Scale: type 3, type 4, type 5 and type 6    Stool softener use: no stool softeners  Sexual  Function:     Sexually Active:  Not sexually active (not currently sexually active since her surgery)    Pain during intercourse: Yes  Patient did experience pain with sex prior to her surgery:  Pain:     Current pain ratin    At worst pain ratin    Location:  Vaginal    Onset:  4-6 months ago    Quality:  Dull ache, sharp and knife-like    Aggravating factors:  [place], exercise, urination and prolonged positions (moving, bending, lifting, has pain with work functions; sitting > standing)    Pain duration: pain is intermittent; can last for a few hours then come back.    Relieving factors:  Medications, heat and ice    Progression:  Improved (not as frequently severe)      Objective       Abdominal Assessment:        Skin inspection:   scars present.   Number of scars: 4  Additional skin inspection details: Laparoscopic incisions - all well healed; no tenderness, redness etc; mobility WNL, sensitivity WNL      General Perineum Exam:   perineum intact.   Negative for swelling, descent, lesion and gaping introitus    General perineum exam comments: Pelvic floor verbal consent and written consent signed and in chart    Education provided today:   Time Spent on Patient Education: 20 min    Pelvic floor anatomy and function  Physiology/relationship of abdominal canister and pelvis/pelvic organs/pelvic floor muscles  Bowel and Bladder anatomy and function    Pelvic and chronic pain education  Surgical and post op considerations  PT exam and course of treatment      Future Education To be Provided:     Visual Inspection of Perineum:   Excursion of perineal body in cephalad direction with contraction of pelvic floor muscles (PFM): weak  Excursion of perineal body in caudal direction with relaxation of pelvic floor muscles (PFM): unable  Involuntary contraction with coughing: no  Involuntary relaxation with bearing down: no  PFM Contraction Comments: [place] palpation: mild tenderness on pubic symphysis  No  Do money concerns keep you from:   Taking your medications as prescribed? Yes   Attending medical appointments? Yes  Within the past 12 months, have you worried whether food would run out before you had money to buy more? Yes   Do you have cultural or Mu-ism practices or beliefs that influence how you care for your diabetes?  No  Over the past two weeks have you felt little interest or pleasure in doing things? No  Over the past two weeks have you felt down, depressed or hopeless? No  To what degree, during the past month, have you been distressed or bothered by the following:     Feeling overwhelmed with the diagnosis of diabetes? A moderate problem   Feeling overwhelmed by the demands of living with diabetes? A moderate problem    How do you get support?   I do not have a source of support    Which Topics Would You Like to Learn About?  planning Meals, monitoring/blood sugar goals, treating high/low blood sugars, benefits of physical activity, diabetes medications, what is diabetes, preventing complications, setting healthy goals, managing stress, sick day care (cold or flu), diabetes lab tests and doctor visits and community resources    Berkley Montgomery RD, [facility][note 3]ES, CD   right and left, center, no tenderness - pubic rami, and ischial tuberosity      Pelvic Clock (external muscle palpation): no pain or tenderness noted      [place] swab test: non-tender  Sensation: intact    Pelvic Organ Prolapse   no pelvic organ prolapse  Perineal body inspection: within normal limits        Pelvic Floor Muscle Exam:     Muscle Contraction: well isolated   Breathing pattern with contraction: holding breath   Pelvic floor muscle relaxation is incomplete.   50% pelvic floor relaxation        PERFECT Score   Power right: 1/5   Power left: 1/5          pelvic floor exam consent given by patient    Pelvic exam completed: vaginally     Graphical documentation:     Patient did experience pain with sex prior to her surgery                 Precautions: anxiety/depression; hysterectomy June 2024; asthma  [employer] HEP:  Treatment Goals:       IE RE   [place]-18     PFDI-20     V-Q 39.39    PGQ         Date 1/9                                                   Manuals             Scar mobilization             STM mobilization             Abdominal Tri Planar Myofascial release             Pelvic Floor Muscle Releases                                       Patient Education             Neuro Re-Ed             Real Time Ultrasound             TA ADIM             PFMC slow holds             Diaphragmatic Breathing             DKC with Diaphragmatic Breathing             Child's Pose with DiaphragmaticBreathing             Body Scan for PFM release/relaxatoin             Pelvic Floor Drops in deep squat                                                                              Ther Ex             PPT             LTR             [place]             UL Butterfly Stretch             Butterfly stretch             Pball pelvic tilts             Pball pelvic circles             Pball hip add stretch                                                                 Ther Activity             EDUCATION/COUNSELING 20 min             Dilator Training             Gait Training                                       Modalities

## 2025-01-10 NOTE — TELEPHONE ENCOUNTER
Notify patient we can use office  We have heparin and lidocaine in the office for bladder instillation

## 2025-01-14 RX ORDER — HEPARIN SODIUM 20000 [USP'U]/ML
INJECTION INTRAVENOUS; SUBCUTANEOUS
Qty: 2 ML | Refills: 3 | OUTPATIENT
Start: 2025-01-14

## 2025-01-16 ENCOUNTER — OFFICE VISIT (OUTPATIENT)
Dept: PHYSICAL THERAPY | Facility: REHABILITATION | Age: 40
End: 2025-01-16
Payer: COMMERCIAL

## 2025-01-16 DIAGNOSIS — R10.2 PELVIC PAIN: Primary | ICD-10-CM

## 2025-01-16 PROCEDURE — 97112 NEUROMUSCULAR REEDUCATION: CPT | Performed by: PHYSICAL THERAPIST

## 2025-01-16 PROCEDURE — 97140 MANUAL THERAPY 1/> REGIONS: CPT | Performed by: PHYSICAL THERAPIST

## 2025-01-16 PROCEDURE — 97530 THERAPEUTIC ACTIVITIES: CPT | Performed by: PHYSICAL THERAPIST

## 2025-01-17 ENCOUNTER — OFFICE VISIT (OUTPATIENT)
Dept: PHYSICAL THERAPY | Facility: CLINIC | Age: 40
End: 2025-01-17
Payer: COMMERCIAL

## 2025-01-17 DIAGNOSIS — G89.29 CHRONIC BILATERAL LOW BACK PAIN WITH BILATERAL SCIATICA: Primary | ICD-10-CM

## 2025-01-17 DIAGNOSIS — M54.41 CHRONIC BILATERAL LOW BACK PAIN WITH BILATERAL SCIATICA: Primary | ICD-10-CM

## 2025-01-17 DIAGNOSIS — M25.551 BILATERAL HIP PAIN: ICD-10-CM

## 2025-01-17 DIAGNOSIS — M54.42 CHRONIC BILATERAL LOW BACK PAIN WITH BILATERAL SCIATICA: Primary | ICD-10-CM

## 2025-01-17 DIAGNOSIS — M25.552 BILATERAL HIP PAIN: ICD-10-CM

## 2025-01-17 PROCEDURE — 97112 NEUROMUSCULAR REEDUCATION: CPT

## 2025-01-17 PROCEDURE — 97110 THERAPEUTIC EXERCISES: CPT

## 2025-01-17 NOTE — PROGRESS NOTES
Daily Note     Today's date: 2025  Patient name: Elis Patterson  : 1985  MRN: [id number]  Referring provider: Korin Caballero MD  Dx:   Encounter Diagnosis     ICD-10-CM    1. Chronic bilateral low back pain with bilateral sciatica  M54.42     M54.41     G89.29       2. Bilateral hip pain  M25.551     M25.552                 Start Time: 0802  Stop Time: 0832  Total time in clinic (min): 30 minutes    Subjective: Pt reports 5/10 low back pain today. Reports no hip and leg pain, however both heels are hurting today. She describes the heel pain as sharp; worsens throughout the day as she is on her feet. Reports that she started pelvic floor PT with [facility] at the [place] location; feels that some of the exercises may have increased low back pain, especially exercises that involved lumbar flexion.     Objective: See treatment diary below  Low back pain decreased from 5/10 to 4/10 following seated lumbar extension using Taras roll    Assessment: Pt reports increase in low back pain since starting pelvic floor therapy; extension seems to decrease low back pain and pt performs lumbar extension in standing without cueing to ease pain. Continued with stabilization exercises today. All treatments tolerated well, no adverse reaction.      Plan: Continue per plan of care.      Precautions: none    [email or web address]  Access [email or web address]: [email or web address]  Sciatic nerve glide  Seated hip IR stretch  Standing lumbar extension  Standing hip extension  Standing Paloff press  PPT + march    Manuals 12/19 12/23 1/3 1/6 1/17                                                            Neuro Re-Ed             Sciatic nerve glide X10 B X10 B           PPT    x10         PPT + 3 breaths    x10         PPT + march    x10         Paloff press    Standing  2x10 B Tband (blue) Standing 2x15                                  Ther Ex             Seated hip IR stretch X10 ea X10 ea           Seated figure 4 stretch X30sec ea             LTR  [id number] B   [id number] B        Knee to chest stretch  [id number] B           Open book  X15 B X15 B          Prone on elbows   x1min          Prone lying   x1min;  2x2min x1min         Standing repeated lumbar ext   2x10 x10         Standing hip ext   X15 B  Bent forward on table 2x10        Glute bridge    2x10 2x10        Seated lumbar ext w/ Taars roll     2x10        Lumbar L side glide on wall     2x10        Ther Activity             Total gym squat  Lvl14  3x15           Leg press   60# x15  65# x22          Gait Training                                       Modalities             Moist heat  3 min

## 2025-01-20 ENCOUNTER — TELEPHONE (OUTPATIENT)
Age: 40
End: 2025-01-20

## 2025-01-20 ENCOUNTER — APPOINTMENT (OUTPATIENT)
Dept: PHYSICAL THERAPY | Facility: CLINIC | Age: 40
End: 2025-01-20
Payer: COMMERCIAL

## 2025-01-20 NOTE — TELEPHONE ENCOUNTER
Patient called to ask if there are any instructions pre bladder instillation. Informed patient, can hydrate as normal, should avoid caffeine. No further questions.

## 2025-01-21 ENCOUNTER — PROCEDURE VISIT (OUTPATIENT)
Dept: OBGYN CLINIC | Facility: CLINIC | Age: 40
End: 2025-01-21
Payer: COMMERCIAL

## 2025-01-21 VITALS
SYSTOLIC BLOOD PRESSURE: 100 MMHG | DIASTOLIC BLOOD PRESSURE: 68 MMHG | HEIGHT: 61 IN | BODY MASS INDEX: 30.21 KG/M2 | WEIGHT: 160 LBS

## 2025-01-21 DIAGNOSIS — N30.10 INTERSTITIAL CYSTITIS: Primary | ICD-10-CM

## 2025-01-21 PROCEDURE — 51700 IRRIGATION OF BLADDER: CPT | Performed by: OBSTETRICS & GYNECOLOGY

## 2025-01-21 NOTE — PROGRESS NOTES
"Assessment/Plan:     Diagnoses and all orders for this visit:    Interstitial cystitis      39-year-old female  Lower pelvic pain/urgency frequency/IC symptom  Diverticulosis  Smoker  Had robotic TLH BS secondary to adenomyosis  History of trichomoniasis  Plan  Different etiology of pelvic pain reviewed and discussed with patient  To follow-up with GI  To follow-up with bladder diet/Kegel  Bladder instillation done today return to office in 1 week consider doing it weekly for 1 to 2 months and monitor for symptom plan explained and discussed with patient all patient questions answered and patient was satisfied    Subjective:      Patient ID: Elis Patterson is a 39 y.o. female.    HPI  Patient seen evaluated presented office today for bladder instillation secondary to lower pelvic pain/urgency/frequency with IC symptoms  Procedure explained and discussed with patient all patient questions answered and patient was satisfied  The following portions of the patient's history were reviewed and updated as appropriate: allergies, current medications, past family history, past medical history, past social history, past surgical history and problem list.    Review of Systems      Objective:      /68 (BP Location: Left arm, Patient Position: Sitting, Cuff Size: Adult)   Ht 5' 1\" (1.549 m)   Wt 72.6 kg (160 lb)   LMP 09/26/2023   BMI 30.23 kg/m²          Physical Exam      Universal Protocol:  Consent: Verbal consent obtained.  Risks and benefits: risks, benefits and alternatives were discussed  Consent given by: patient  Time out: Immediately prior to procedure a \"time out\" was called to verify the correct patient, procedure, equipment, support staff and site/side marked as required.  Patient understanding: patient states understanding of the procedure being performed  Patient consent: the patient's understanding of the procedure matches consent given  Procedure consent: procedure consent matches procedure " scheduled  Patient identity confirmed: verbally with patient    Bladder catheterization    Date/Time: 1/21/2025 3:30 PM    Performed by: Brett Everett MD  Authorized by: Brett Everett MD    Patient location:  Bedside  Consent:     Consent given by:  Patient  Universal protocol:     Procedure explained and questions answered to patient or proxy's satisfaction: yes      Immediately prior to procedure a time out was called: yes      Patient identity confirmed:  Verbally with patient  Pre-procedure details:     Procedure purpose:  Therapeutic    Preparation: Patient was prepped and draped in usual sterile fashion    Anesthesia (see MAR for exact dosages):     Anesthesia method:  Topical application    Topical anesthetic:  Lidocaine gel  Procedure details:     Bladder irrigation: yes      Catheter insertion:  Non-indwelling    Catheter type:  Non-latex and red rubber    Catheter size:  8 Fr    Number of attempts:  1    Successful placement: yes      Urine characteristics:  Clear    Provider performed due to:  Nurse unavailable  Post-procedure details:     Patient tolerance of procedure:  Tolerated well, no immediate complications  Comments:      Betadine was used following the lidocaine gel was used following the 8 [place] catheter was inserted 50 cc of clear urine noted following the bladder instillation was performed 14 international units of heparin mixed with 10 mL of sterile water mixed with 5 mL of bicarb then 3 mL of lidocaine gel was injected  Patient Toller procedure well return to office in 1 week for bladder instillation

## 2025-01-23 ENCOUNTER — OFFICE VISIT (OUTPATIENT)
Dept: PHYSICAL THERAPY | Facility: REHABILITATION | Age: 40
End: 2025-01-23
Payer: COMMERCIAL

## 2025-01-23 DIAGNOSIS — R10.2 PELVIC PAIN: Primary | ICD-10-CM

## 2025-01-23 PROCEDURE — 97112 NEUROMUSCULAR REEDUCATION: CPT | Performed by: PHYSICAL THERAPIST

## 2025-01-23 PROCEDURE — 97530 THERAPEUTIC ACTIVITIES: CPT | Performed by: PHYSICAL THERAPIST

## 2025-01-23 NOTE — PROGRESS NOTES
Daily Note     Today's date: 2025  Patient name: Elis Patterson  : 1985  MRN: [id number]  Referring provider: Brett Everett MD  Dx:   Encounter Diagnosis     ICD-10-CM    1. Pelvic pain  R10.2           Start Time: 1600  Stop Time: 1700  Total time in clinic (min): 60 minutes    Subjective: The patient notes that the past few days she has had a flare up of pelvic pain. She also had some low back and knee pain over the last week as well. She did have her first bladder instillation two days ago. She notes that she was sore afterwards. She has not noticed a difference in her bladder pain.       Objective: See treatment diary below      Assessment: Tolerated treatment well. Patient  held some pelvic floor downtraining exercises today as they may have bothered her back after last session.  She has a hard time isolating her pelvic floor muscles on Biofeedback today, some glute and hip adductor activation. She was uncomfortable on the treatment table, even with her knees on bolster. She notes low back and knee pain and also some discomfort in her feet, a little improved in sitting. She was able to better isolate in sitting. She is able to fully relax her pelvic floor muscles and she does demonstrate fair muscle recruitment. She has no changes in post treatment today.       Plan: Continue per plan of care.    Precautions: anxiety/depression; hysterectomy 2024; asthma  Medbridge HEP: [id number]   Treatment Goals:         IE RE   [place]-18     PFDI-20     V-Q 39.39    PGQ         Date                                                  Manuals             Scar mobilization             STM mobilization             Abdominal Tri Planar Myofascial release             Pelvic Floor Muscle Releases  10 min                                     Patient Education             Neuro Re-Ed             Biofeedback   20 min          TA ADIM             PFMC slow holds             Diaphragmatic Breathing  10 min  Done           DKC with Diaphragmatic Breathing  15 sec x 3 ea hold            Child's Pose with DiaphragmaticBreathing  15 sec x 3 ea hold          Body Scan for PFM release/relaxatoin  8 min           Pelvic Floor Drops in deep squat             PFM contractions  10x supine/hooklying 10 x supine    5x sitting          DB seated on pball   2 min          Pelvic circles on pball for PFM relaxation   10x cw/ccw                                    Ther Ex             PPT             LTR             Cat Cow             UL Butterfly Stretch             Butterfly stretch             Pball pelvic tilts             Pball pelvic circles             Pball hip add stretch                                                                 Ther Activity             EDUCATION/COUNSELING 20 min 10 min 15 min          Dilator Training             Gait Training                                       Modalities

## 2025-01-27 PROBLEM — M79.671 PAIN IN BOTH FEET: Status: ACTIVE | Noted: 2025-01-27

## 2025-01-27 PROBLEM — M79.672 PAIN IN BOTH FEET: Status: ACTIVE | Noted: 2025-01-27

## 2025-01-27 PROBLEM — F32.5 MAJOR DEPRESSIVE DISORDER WITH SINGLE EPISODE, IN FULL REMISSION (HCC): Status: ACTIVE | Noted: 2024-07-24

## 2025-01-27 NOTE — PROGRESS NOTES
"Name: Elis Patterson      : 1985      MRN: [id number]  Encounter Provider: Korin Caballero MD  Encounter Date: 2/3/2025   Encounter department: [facility]  :  Assessment & Plan  Pain in both feet  To podiatry       Major depressive disorder with single episode, in full remission (HCC)  Feels ok on wellbutrin and lexapro     Depression Screening Follow-up Plan: Patient's depression screening was positive with a PHQ-9 score of 6. Patient advised to follow-up with PCP for further management.  Class 1 obesity due to excess calories without serious comorbidity with body mass index (BMI) of 30.0 to 30.9 in adult  Discussion on diet and exercise guidelines for weight loss and  health reviewed with pt          Gastroesophageal reflux disease without esophagitis  Ok on nexum       Moderate persistent asthma without complication  No recent flare ups on meds        Foot pain, bilateral  Pt with bilateral foot pain for a few weeks has tried different in soles and shoes with no help   Orders:  •  Ambulatory Referral to Podiatry; Future           History of Present Illness   Pt is here for interval visit and evaluation of multiple medical problems, review of medications, labs, Health Maintenance and any recent specialty consults  Pt with complaints of foot pain      Review of Systems   Musculoskeletal:         Bilateral foot pain heel       Objective   /82 (BP Location: Left arm, Patient Position: Sitting, Cuff Size: Standard)   Pulse 68   Temp 98 °F (36.7 °C) (Tympanic)   Resp 16   Ht 5' 1\" (1.549 m)   Wt 74.8 kg (165 lb)   LMP 2023   SpO2 98%   BMI 31.18 kg/m²      Physical Exam  Constitutional:       Appearance: Normal appearance. She is well-developed.   HENT:      Head: Normocephalic.   Cardiovascular:      Rate and Rhythm: Normal rate and regular rhythm.      Heart sounds: Normal heart sounds.   Pulmonary:      Effort: Pulmonary effort is normal. No respiratory " distress.      Breath sounds: Normal breath sounds.   Musculoskeletal:         General: Tenderness (bilateral heel to arch) present.      Right lower leg: No edema.      Left lower leg: No edema.   Neurological:      General: No focal deficit present.      Mental Status: She is alert and oriented to person, place, and time. Mental status is at baseline.   Psychiatric:         Mood and Affect: Mood normal.         Behavior: Behavior normal.         Thought Content: Thought content normal.         Judgment: Judgment normal.

## 2025-01-27 NOTE — ASSESSMENT & PLAN NOTE
Feels ok on wellbutrin and lexapro     Depression Screening Follow-up Plan: Patient's depression screening was positive with a PHQ-9 score of 6. Patient advised to follow-up with PCP for further management.

## 2025-01-27 NOTE — PROGRESS NOTES
Pre-charting for Appointment      Reason for being seen today: Bladder instillation    Any current testing/imaging done prior to exam today:

## 2025-01-28 ENCOUNTER — PROCEDURE VISIT (OUTPATIENT)
Dept: OBGYN CLINIC | Facility: CLINIC | Age: 40
End: 2025-01-28
Payer: COMMERCIAL

## 2025-01-28 VITALS
SYSTOLIC BLOOD PRESSURE: 106 MMHG | WEIGHT: 160 LBS | HEIGHT: 61 IN | BODY MASS INDEX: 30.21 KG/M2 | DIASTOLIC BLOOD PRESSURE: 60 MMHG

## 2025-01-28 DIAGNOSIS — N30.10 INTERSTITIAL CYSTITIS: Primary | ICD-10-CM

## 2025-01-28 PROCEDURE — 51700 IRRIGATION OF BLADDER: CPT | Performed by: OBSTETRICS & GYNECOLOGY

## 2025-01-28 NOTE — PROGRESS NOTES
"Assessment/Plan:     Diagnoses and all orders for this visit:    Interstitial cystitis      39-year-old female  Lower pelvic pain/urgency frequency/IC symptom  Diverticulosis  Smoker  Had robotic TLH BS secondary to adenomyosis  History of trichomoniasis  Plan  Different etiology of pelvic pain reviewed and discussed with patient  To follow-up with GI  To follow-up with bladder diet/Kegel  Second bladder instillation done today return to office in 1 week consider doing it weekly for 1 to 2 months and monitor for symptom plan explained and discussed with patient all patient questions answered and patient was satisfied    Subjective:      Patient ID: Elis Patterson is a 39 y.o. female.    HPI  Patient seen evaluated present to the office today for a bladder instillation secondary to lower pelvic pain/IC symptom  Patient followed the diet discussed with her at the time of the prior visit and noted her symptom is worsening when she eats spicy food  Patient is trying to avoid spicy food  Patient is here for her second interstitial cystitis treatment  Procedure explained and discussed with patient all patient questions answered and patient was satisfied    The following portions of the patient's history were reviewed and updated as appropriate: allergies, current medications, past family history, past medical history, past social history, past surgical history and problem list.    Review of Systems      Objective:      /60 (BP Location: Left arm, Patient Position: Sitting, Cuff Size: Adult)   Ht 5' 1\" (1.549 m)   Wt 72.6 kg (160 lb)   LMP 09/26/2023   BMI 30.23 kg/m²          Physical Exam    Universal Protocol:  Consent: Verbal consent obtained. Written consent obtained.  Risks and benefits: risks, benefits and alternatives were discussed  Consent given by: patient  Time out: Immediately prior to procedure a \"time out\" was called to verify the correct patient, procedure, equipment, support staff and " site/side marked as required.  Patient understanding: patient states understanding of the procedure being performed  Patient consent: the patient's understanding of the procedure matches consent given  Procedure consent: procedure consent matches procedure scheduled  Patient identity confirmed: verbally with patient    Bladder catheterization    Date/Time: 1/28/2025 3:30 PM    Performed by: Brett Everett MD  Authorized by: Brett Everett MD    Patient location:  Bedside  Consent:     Consent given by:  Patient  Universal protocol:     Procedure explained and questions answered to patient or proxy's satisfaction: yes      Immediately prior to procedure a time out was called: yes      Patient identity confirmed:  Verbally with patient  Pre-procedure details:     Procedure purpose:  Therapeutic  Anesthesia (see MAR for exact dosages):     Anesthesia method:  Topical application    Topical anesthetic:  Lidocaine gel  Procedure details:     Bladder irrigation: yes      Catheter insertion:  Non-indwelling    Catheter type:  Non-latex and red rubber    Catheter size:  8 Fr    Number of attempts:  1    Successful placement: yes      Urine characteristics:  Clear and yellow    Provider performed due to:  Nurse unavailable  Post-procedure details:     Patient tolerance of procedure:  Tolerated well, no immediate complications  Comments:      After 8 [place] catheter was inserted 50 cc of clear urine was drained from the bladder following the bladder instillation was performed using 40,000 international unit heparin mixed with 10 mL of sterile water mixed with 5 mL of bicarb followed by 3 cc of lidocaine gel  Patient tolerated procedure well patient instructed to hold her urine for 3 hours then return to office in 1 week

## 2025-01-30 ENCOUNTER — OFFICE VISIT (OUTPATIENT)
Dept: PHYSICAL THERAPY | Facility: REHABILITATION | Age: 40
End: 2025-01-30
Payer: COMMERCIAL

## 2025-01-30 DIAGNOSIS — R10.2 PELVIC PAIN: Primary | ICD-10-CM

## 2025-01-30 PROCEDURE — 97530 THERAPEUTIC ACTIVITIES: CPT | Performed by: PHYSICAL THERAPIST

## 2025-01-30 PROCEDURE — 97112 NEUROMUSCULAR REEDUCATION: CPT | Performed by: PHYSICAL THERAPIST

## 2025-01-30 PROCEDURE — 97140 MANUAL THERAPY 1/> REGIONS: CPT | Performed by: PHYSICAL THERAPIST

## 2025-01-30 NOTE — PROGRESS NOTES
PT Re-Evaluation     Today's date: 2025  Patient name: Elis Patterson  : 1985  MRN: [id number]  Referring provider: Brett Everett MD  Dx:   Encounter Diagnosis     ICD-10-CM    1. Pelvic pain  R10.2           Start Time: 1400  Stop Time: 1500  Total time in clinic (min): 60 minutes    Assessment  Impairments: abnormal coordination, abnormal or restricted ROM, activity intolerance, impaired physical strength, lacks appropriate home exercise program, pain with function, poor posture  and poor body mechanics    Assessment details: The patient is a 39 y.o. female with complaints of pelvic pain for the past 6 months or so. Her history includes a total hysterectomy (ovary sparing) in 2024 due to adenomyosis and fibroids. Her menstrual history includes heavy periods. She presents with some tenderness in the suprapubic region and over the pubic symphysis, as well as some generalized tenderness throughout the pelvic floor R > L. She does demonstrate some tightness and tension in the pelvic floor, right > left. She also has weakness of her pelvic floor muscles as well, possibly due to hypertonicity. She has been treated for a total of 3 visits. She has no improvements to report so far. She did start receiving bladder instillations as well. She would benefit from continuing pelvic floor physical therapy to help reduce/manage pain and symptoms, address impairments and maximize function and quality of life upon discharge. She will be given updated HEP throughout episode of care.     Therapeutic activities performed upon examination included education regarding pelvic floor anatomy, explanation of exam technique, explanation of exam findings and discussion of treatment plan as well as expectations of the patient to emphasize the importance of compliance and adherence to physical therapy visits.               Understanding of Dx/Px/POC: good    Goals  ST. The patient will reduce pelvic floor muscle  tone by 25 to 50%  in 12 weeks. - not met  2. The patient will improve pelvic floor muscle strength by 1 grade with improved coordination in 12 weeks. - not met  3. The patient will demonstrate good posture with sitting on toilet to promote proper pelvic floor muscle lengthening.   4. The patient will demonstrate good isolation of respiratory diaphragm and good coordination of diaphragm and transverse abdominis in 8 to 10 weeks.     LT. The patient will be compliant with exercises and self care program to independently manage symptoms upon discharge.   2. The patient will minimize straining for evacuation of bowels upon discharge.   3. The patient will have minimal to no pelvic pain upon discharge.  4. The patient will demonstrate improved bladder voiding and emptying of her bowels upon discharge.       Plan  Patient would benefit from: skilled physical therapy  Planned modality interventions: biofeedback    Planned therapy interventions: activity modification, abdominal trunk stabilization, behavior modification, body mechanics training, breathing training, coordination, flexibility, functional ROM exercises, IADL retraining, manual therapy, neuromuscular re-education, patient education, postural training, self care, strengthening, therapeutic activities and therapeutic exercise    Frequency: 1x week  Duration in weeks: 12  Plan of Care beginning date: 2025  Plan of Care expiration date: 2025  Treatment plan discussed with: patient        PT Pelvic Floor Subjective:   History of Present Illness:   Update 25: The patient went for her second bladder instillation this week. She notes less discomfort with the procedure for the second time. She does feel that she had a little relief after this second instillation. She may have had a little less bladder frequency afterwards. Otherwise, no change in symptoms since beginning PT on . She does continue to experience low back pain which she attends PT  at a different clinic for symptoms related to that.       IE: 1/9/25  The patient notes that she is experiencing ongoing pelvic pain. She notes that after her hysterectomy, she started to notice pelvic pain. She describes the pain as vaginal and abdominal. She experiences pain if she has to urinate or empty her bowels. She does not wake up in the middle of the night, and when she wakes up in the morning her bladder is full and this causes her pain. She does have pain when she empties, but then relief 15 minutes after emptying. She describes the pain as achy and sharp. She saw Dr. Everett recently and she recommended pelvic floor therapy and bladder instillations. She feels that her symptoms are consistent with interstitial cystitis. She is scheduled later this month to follow up with Dr. Everett and receive her first bladder instillation.     She has also been attending PT for her lower back and legs at the [facility]. Mechanism of injury: surgery    Total Hysterectomy: 6/28/24  Social Support:     Lives with: lives at home with mom, brother, and sister.    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)    History of Depression: yes  Diet and Exercise:    Diet:balanced nutrition  OB/ gyn History    Gestational History:     Prior Pregnancy: No      Menstrual History:      Birth control: no contraception  Can feel cramping and ovulation symptoms  Hysterectomy: 6/28/24: total hysterectomy   Periods were very irregular and extremely heavy - she notes that they were like this for a long time  She did try oral BC and Depo injections to help but this did not help at all with her bleeding  Bladder Function:     Voiding Difficulties positive for: urgency (sometimes), hesitancy (takes time to start her stream) and incomplete emptying (sometimes)      Voiding Difficulties negative for: frequent urination and straining       Voiding Difficulties comments:      Voiding frequency: every 3-4 hours    Urinary leakage: no urine leakage    Nocturia (episodes per night): 0    Painful urination: Yes      Fluid Intake Type:  Water    Intake (ounces):     Intake (ounces) comment: Water:  ounces  1 cup of coffee a day  Soda: A few cans a week, not every day  Bowel Function:     Voiding DIfficulties: painful defecating, unfinished feeling after defecating and constipation      Bowel Function comments:  Strain to go and painful to pass    Bowel frequency: daily    [place] Stool Scale: type 3, type 4, type 5 and type 6    Stool softener use: no stool softeners  Sexual Function:     Sexually Active:  Not sexually active (not currently sexually active since her surgery)    Pain during intercourse: Yes  Patient did experience pain with sex prior to her surgery:  Pain:     Current pain ratin    At worst pain ratin    Location:  Vaginal    Onset:  4-6 months ago    Quality:  Dull ache, sharp and knife-like    Aggravating factors:  [place], exercise, urination and prolonged positions (moving, bending, lifting, has pain with work functions; sitting > standing)    Pain duration: pain is intermittent; can last for a few hours then come back.    Relieving factors:  Medications, heat and ice    Progression:  Improved (not as frequently severe)      Objective       Abdominal Assessment:      Abdominal Assessment: Soft; no tenderness reported  Some pressure in suprapubic region    Skin inspection:   scars present.   Number of scars: 4  Additional skin inspection details: Laparoscopic incisions - all well healed; no tenderness, redness etc; mobility WNL, sensitivity WNL      General Perineum Exam:   perineum intact.   Negative for swelling, descent, lesion and gaping introitus    General perineum exam comments: Pelvic floor verbal consent and written consent signed and in chart    Education provided today:   Time Spent on Patient Education: 20 min    Pelvic floor anatomy and  function  Physiology/relationship of abdominal canister and pelvis/pelvic organs/pelvic floor muscles  Bowel and Bladder anatomy and function    Pelvic and chronic pain education  Surgical and post op considerations  PT exam and course of treatment      Future Education To be Provided:     Visual Inspection of Perineum:   Excursion of perineal body in cephalad direction with contraction of pelvic floor muscles (PFM): weak  Excursion of perineal body in caudal direction with relaxation of pelvic floor muscles (PFM): unable  Involuntary contraction with coughing: no  Involuntary relaxation with bearing down: no  PFM Contraction Comments: Bony [place] palpation: mild tenderness on pubic symphysis right and left, center, no tenderness - pubic rami, and ischial tuberosity      Pelvic Clock (external muscle palpation): no pain or tenderness noted      [place] swab test: non-tender  Sensation: intact    Pelvic Organ Prolapse   no pelvic organ prolapse  Perineal body inspection: within normal limits        Pelvic Floor Muscle Exam:     Muscle Contraction: overflow, substitution and compensation with glutes and hip adductors   Breathing pattern with contraction: holding breath   Pelvic floor muscle relaxation is incomplete.   50% pelvic floor relaxation        PERFECT Score   Power right: 1/5   Power left: 1/5          pelvic floor exam consent given by patient    Pelvic exam completed: vaginally     SMEG Biofeedback   Sensor used: external sensors placed perianally  Positioning: supine    power protocol   Secs work/Secs rest/Reps: 2  Max achieved (microvolts): 7  Working average (microvolts): 5  Recruitment: moderate  Holding ability: poor  Derecruitment: fair  Biofeedback comments: Compensation with glutes, breath holding, and hip adductors.   Limited muscle recruitment and coordination noted    Graphical documentation:     Patient did experience pain with sex prior to her surgery           Precautions: anxiety/depression;  hysterectomy June 2024; asthma  Medbridge HEP: [id number]   Treatment Goals:         IE RE   [place]-18     PFDI-20     V-Q 39.39    PGQ         Date 1/9 1/16 1/23 1/30                                                Manuals             Scar mobilization             STM mobilization             Abdominal Tri Planar Myofascial release    10 min         Pelvic Floor Muscle Releases  10 min  10 min                                   Patient Education             Neuro Re-Ed             Biofeedback   20 min          TA ADIM             PFMC slow holds             Diaphragmatic Breathing  10 min Done  done         DKC with Diaphragmatic Breathing  15 sec x 3 ea hold            Child's Pose with DiaphragmaticBreathing  15 sec x 3 ea hold          Body Scan for PFM release/relaxatoin  8 min           Pelvic Floor Drops in deep squat             PFM contractions  10x supine/hooklying 10 x supine    5x sitting 10x supine         DB seated on pball   2 min          Pelvic circles on pball for PFM relaxation   10x cw/ccw 10x cw/ccw                                   Ther Ex             PPT             LTR             Cat Cow             UL Butterfly Stretch             Butterfly stretch             Pball pelvic tilts             Pball pelvic circles             Pball hip add stretch                                                                 Ther Activity             EDUCATION/COUNSELING 20 min 10 min 15 min 15 min         Dilator Training             Gait Training                                       Modalities

## 2025-01-31 ENCOUNTER — EVALUATION (OUTPATIENT)
Dept: PHYSICAL THERAPY | Facility: CLINIC | Age: 40
End: 2025-01-31
Payer: COMMERCIAL

## 2025-01-31 DIAGNOSIS — M54.41 CHRONIC BILATERAL LOW BACK PAIN WITH BILATERAL SCIATICA: Primary | ICD-10-CM

## 2025-01-31 DIAGNOSIS — M25.552 BILATERAL HIP PAIN: ICD-10-CM

## 2025-01-31 DIAGNOSIS — G89.29 CHRONIC BILATERAL LOW BACK PAIN WITH BILATERAL SCIATICA: Primary | ICD-10-CM

## 2025-01-31 DIAGNOSIS — M54.42 CHRONIC BILATERAL LOW BACK PAIN WITH BILATERAL SCIATICA: Primary | ICD-10-CM

## 2025-01-31 DIAGNOSIS — M25.551 BILATERAL HIP PAIN: ICD-10-CM

## 2025-01-31 PROCEDURE — 97112 NEUROMUSCULAR REEDUCATION: CPT

## 2025-01-31 PROCEDURE — 97110 THERAPEUTIC EXERCISES: CPT

## 2025-01-31 NOTE — PROGRESS NOTES
PT Re-Evaluation     Today's date: 2025  Patient name: Elis Patterson  : 1985  MRN: [id number]  Referring provider: Korin Caballero MD  Dx:   Encounter Diagnosis     ICD-10-CM    1. Chronic bilateral low back pain with bilateral sciatica  M54.42     M54.41     G89.29       2. Bilateral hip pain  M25.551     M25.552             Start Time: 0800  Stop Time: 0843  Total time in clinic (min): 43 minutes    Assessment  Impairments: abnormal muscle tone, abnormal or restricted ROM, abnormal movement, activity intolerance, impaired balance, impaired physical strength, lacks appropriate home exercise program, pain with function, participation limitations and activity limitations  Symptom irritability: moderate    Assessment details: Elis has made some progress with her low back, hip, knee, and ankle pain. Overall, pain levels in these areas have decreased, however pt does not report significant improvement in function. She is also experiencing pelvic floor pain, which may contribute to decrease in ability to participate in functional activity. Due to report of abnormal sitting posture contributing to her knee pain, I encouraged her to gradually adjust to sitting with feet on floor (5 minutes at a time).   Treatment today was focused on addressing sciatic nerve tension, improving lumbar spine mobility, and strengthening core.   Pt would benefit from continued skilled PT services to address impairments and improve ability to participate in functional activities in the community.  Understanding of Dx/Px/POC: fair     Prognosis: fair    Goals  Short term (1-3 weeks)  1. Pt will demo consistent and independent performance of progressive HEP. -- progressing 50%  2. Pt will demo 1/3 or greater improvement in hip flexion MMT. - met 100%  Long term (4-8 weeks)  1. Pt will demo 4+ or greater hip flexion MMT bilaterally. - met 100%  2. Pt will demo 2/10 or lower low back pain with forward bending. - progressing  25%  3. Pt will demo negative slump test bilaterally. - not met  4. Pt will demo ability to lift 25lbs from floor to waist level. - not met      Plan  Patient would benefit from: skilled physical therapy    Planned therapy interventions: abdominal trunk stabilization, joint mobilization, manual therapy, neuromuscular re-education, breathing training, strengthening, flexibility, stretching, functional ROM exercises, therapeutic activities, gait training, therapeutic exercise and home exercise program    Frequency: 1-2x week  Duration in weeks: 14  Plan of Care beginning date: 12/19/2024  Plan of Care expiration date: 3/27/2025  Treatment plan discussed with: patient        Subjective Evaluation    History of Present Illness  Mechanism of injury: PN (1/31/25):  Pt reports that her low back pain has not changed significantly because she is experiencing pelvic floor pain and interstitial cystitis which are contributing to the pain. Reports that B heels still bother her R>L. Reports that B hips have been feeling good, no significant pain anymore.     Pain levels:  Back - C: 2/10, W: 10/10, B: 1/10  Hips - C: 0/10, W: 2/10, B: 0/10  Knees - C: 1/10, W: 8/10, B: 0/10  Ankles - C: 1/10, W: 7/10, B: 1/10  Heels - C: 0/10, W: 10/10, B: 0/10    Initial evaluation:  Patient is a 39 y.o. Female who is referred to OP PT for evaluation and treatment of Bilateral hip pain and Chronic bilateral low back pain with bilateral sciatica. Reports that she has had the pain for a few the past few years, but pain has been worsening over the past month. Describes low back pain as sharp, pins/needles, and stabbing. Pain is located on midline lumbar spine to SIJ. B hip pain is described as achy L>R. Hip pain is located around greater trochanter. B knee pain is described sharp L>R. Knee pain is located around patella. Pt also reports pain in medial and lateral ankles L>R, described as dull/achy. Denies numbness/tinging down LEs.  Aggravating  factors include cold, rainy weather, ascending/descending stairs, bending forward.  Easing factors include pain medicine.  Pt is going to be receiving XR soon.  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  Past surgical history - hysterectomy (approx 6 months ago).  Denies unexpected weight change, fever/sweats/chills, bowel/bladder changes, saddle paresthesia.  Endorses night pain.    Pain levels:  Back - C: 7/10, W: 10/10, B: 2/10  Hips - C: 3/10, W: 10/10, B: 2/10  Knees - 3/10, W: 10/10, B: 2/10  Ankles - 5/10, W: 10/10, B: 2/10    Patient Goals  Patient goals for therapy: increased strength, decreased pain, increased motion and return to sport/leisure activities          Objective     AROM R L   Lumbar flexion WNL   Lumbar extension WNL   Lumbar side bend WNL WNL   Lumbar rotation WNL WNL     AROM R L   Hip flexion 120 100   Hip extension     Hip abduction     Hip adduction     Hip ER 50 50   Hip IR 45 45   Knee flexion WFL WFL   Knee extension 2 deg hyper 2 deg hyper   Ankle DF WFL WFL   Ankle PF WFL WFL   Ankle inversion     Ankle eversion       MMT R L   Hip flexion 4+ 4+   Hip extension     Hip abduction     Hip adduction     Hip ER     Hip IR     Knee flexion 4+ 4+   Knee extension 4+ 4+   Ankle DF 4+ 4+   Ankle PF     Ankle inversion     Ankle eversion       Slump test: (+) bilaterally    Straight leg raise: (-) B    5x STS: 10 sec       Precautions: none    [email or web address]  Access [email or web address]: [email or web address]  Sciatic nerve glide  Seated hip IR stretch    Manuals 12/19 12/23 1/3 1/6 1/17 1/31                                                           Neuro Re-Ed             Sciatic nerve glide X10 B X10 B    2x10 B       PPT    x10         PPT + 3 breaths    x10         PPT + march    x10         Paloff press    Standing  2x10 B Tband (blue) Standing 2x15 Tband (blue) seated 2x15 B                                 Ther Ex             Seated hip  IR stretch X10 ea X10 ea           Seated figure 4 stretch X30sec ea            LTR  13i7vty B   73x2avm B X10 B       Knee to chest stretch  [id number] B           Open book  X15 B X15 B          Prone on elbows   x1min          Prone lying   x1min;  2x2min x1min         Standing repeated lumbar ext   2x10 x10         Standing hip ext   X15 B  Bent forward on table 2x10        Glute bridge    2x10 2x10        Seated lumbar ext w/ Taras roll     2x10        Lumbar L side glide on wall     2x10        Edu      Re: objective findings       Ther Activity             Total gym squat  Lvl14  3x15           Leg press   60# x15  65# x22          Gait Training                                       Modalities             Moist heat  3 min

## 2025-02-03 ENCOUNTER — OFFICE VISIT (OUTPATIENT)
Dept: FAMILY MEDICINE CLINIC | Facility: CLINIC | Age: 40
End: 2025-02-03
Payer: COMMERCIAL

## 2025-02-03 VITALS
RESPIRATION RATE: 16 BRPM | SYSTOLIC BLOOD PRESSURE: 124 MMHG | TEMPERATURE: 98 F | BODY MASS INDEX: 31.15 KG/M2 | HEART RATE: 68 BPM | WEIGHT: 165 LBS | DIASTOLIC BLOOD PRESSURE: 82 MMHG | OXYGEN SATURATION: 98 % | HEIGHT: 61 IN

## 2025-02-03 DIAGNOSIS — K21.9 GASTROESOPHAGEAL REFLUX DISEASE WITHOUT ESOPHAGITIS: ICD-10-CM

## 2025-02-03 DIAGNOSIS — M79.672 FOOT PAIN, BILATERAL: ICD-10-CM

## 2025-02-03 DIAGNOSIS — F32.5 MAJOR DEPRESSIVE DISORDER WITH SINGLE EPISODE, IN FULL REMISSION (HCC): ICD-10-CM

## 2025-02-03 DIAGNOSIS — M79.672 PAIN IN BOTH FEET: Primary | ICD-10-CM

## 2025-02-03 DIAGNOSIS — M79.671 PAIN IN BOTH FEET: Primary | ICD-10-CM

## 2025-02-03 DIAGNOSIS — E66.09 CLASS 1 OBESITY DUE TO EXCESS CALORIES WITHOUT SERIOUS COMORBIDITY WITH BODY MASS INDEX (BMI) OF 30.0 TO 30.9 IN ADULT: ICD-10-CM

## 2025-02-03 DIAGNOSIS — J45.40 MODERATE PERSISTENT ASTHMA WITHOUT COMPLICATION: ICD-10-CM

## 2025-02-03 DIAGNOSIS — E66.811 CLASS 1 OBESITY DUE TO EXCESS CALORIES WITHOUT SERIOUS COMORBIDITY WITH BODY MASS INDEX (BMI) OF 30.0 TO 30.9 IN ADULT: ICD-10-CM

## 2025-02-03 DIAGNOSIS — M79.671 FOOT PAIN, BILATERAL: ICD-10-CM

## 2025-02-03 PROCEDURE — 99213 OFFICE O/P EST LOW 20 MIN: CPT | Performed by: FAMILY MEDICINE

## 2025-02-03 NOTE — ASSESSMENT & PLAN NOTE
Pt with bilateral foot pain for a few weeks has tried different in soles and shoes with no help   Orders:  •  Ambulatory Referral to Podiatry; Future

## 2025-02-04 ENCOUNTER — PROCEDURE VISIT (OUTPATIENT)
Dept: OBGYN CLINIC | Facility: CLINIC | Age: 40
End: 2025-02-04
Payer: COMMERCIAL

## 2025-02-04 VITALS
SYSTOLIC BLOOD PRESSURE: 106 MMHG | BODY MASS INDEX: 30.7 KG/M2 | DIASTOLIC BLOOD PRESSURE: 78 MMHG | WEIGHT: 162.6 LBS | HEIGHT: 61 IN

## 2025-02-04 DIAGNOSIS — N30.10 INTERSTITIAL CYSTITIS: Primary | ICD-10-CM

## 2025-02-04 DIAGNOSIS — R10.2 PELVIC PAIN: ICD-10-CM

## 2025-02-04 PROCEDURE — 51700 IRRIGATION OF BLADDER: CPT | Performed by: OBSTETRICS & GYNECOLOGY

## 2025-02-05 NOTE — PROGRESS NOTES
"Assessment/Plan:     Diagnoses and all orders for this visit:    Interstitial cystitis    Pelvic pain       39-year-old female  Lower pelvic pain/urgency frequency/IC symptom  Diverticulosis  Smoker  Had robotic TLH BS secondary to adenomyosis  History of trichomoniasis  Plan  Different etiology of pelvic pain reviewed and discussed with patient  To follow-up with GI  To follow-up with bladder diet/Kegel   bladder instillation done today return to office in 1 week consider doing it weekly for 1 to 2 months and monitor for symptom plan explained and discussed with patient all patient questions answered and patient was satisfied       Subjective:      Patient ID: Elis Patterson is a 39 y.o. female.    HPI  Patient seen evaluated presented office today for bladder instillation secondary to pelvic pain/interstitial cystitis  Patient started on bladder instillation and noticed improvement in her symptoms  Patient said pain is improving with medication  Symptom of urgency frequency and dysuria improved  Denies any problem with bowel movement  Patient desired to continue with procedure as helping her with her symptom procedure reviewed and discussed with patient all patient questions answered and patient was satisfied  The following portions of the patient's history were reviewed and updated as appropriate: allergies, current medications, past family history, past medical history, past social history, past surgical history and problem list.    Review of Systems      Objective:      /78 (BP Location: Left arm, Patient Position: Sitting, Cuff Size: Adult)   Ht 5' 1\" (1.549 m)   Wt 73.8 kg (162 lb 9.6 oz)   LMP 09/26/2023   BMI 30.72 kg/m²          Physical Exam    Universal Protocol:  Consent: Verbal consent obtained.  Risks and benefits: risks, benefits and alternatives were discussed  Consent given by: patient  Time out: Immediately prior to procedure a \"time out\" was called to verify the correct patient, " procedure, equipment, support staff and site/side marked as required.  Patient understanding: patient states understanding of the procedure being performed  Patient consent: the patient's understanding of the procedure matches consent given  Procedure consent: procedure consent matches procedure scheduled  Patient identity confirmed: verbally with patient    Bladder catheterization    Date/Time: 2/4/2025 3:30 PM    Performed by: Brett Everett MD  Authorized by: Brett Everett MD    Consent:     Consent given by:  Patient  Universal protocol:     Procedure explained and questions answered to patient or proxy's satisfaction: yes      Immediately prior to procedure a time out was called: yes      Patient identity confirmed:  Verbally with patient  Pre-procedure details:     Procedure purpose:  Therapeutic    Preparation: Patient was prepped and draped in usual sterile fashion    Anesthesia (see MAR for exact dosages):     Anesthesia method:  Topical application    Topical anesthetic:  Lidocaine gel  Procedure details:     Bladder irrigation: yes      Catheter insertion:  Non-indwelling    Approach: natural orifice      Catheter type:  Non-latex    Catheter size:  8 Fr    Number of attempts:  1    Successful placement: yes      Urine characteristics:  Clear    Provider performed due to:  Nurse unavailable  Post-procedure details:     Patient tolerance of procedure:  Tolerated well, no immediate complications  Comments:      After 8 Indian catheter was inserted 50 cc of clear urine was drained from the bladder following the bladder instillation was performed using 40,000 international unit heparin mixed with 10 mL of sterile water mixed with 5 mL of bicarb followed by 3 cc of lidocaine gel  Patient tolerated procedure well patient instructed to hold her urine for 3 hours then return to office in 1 week

## 2025-02-06 ENCOUNTER — APPOINTMENT (OUTPATIENT)
Dept: PHYSICAL THERAPY | Facility: REHABILITATION | Age: 40
End: 2025-02-06
Payer: COMMERCIAL

## 2025-02-10 ENCOUNTER — PROCEDURE VISIT (OUTPATIENT)
Dept: OBGYN CLINIC | Facility: CLINIC | Age: 40
End: 2025-02-10

## 2025-02-10 VITALS
DIASTOLIC BLOOD PRESSURE: 76 MMHG | SYSTOLIC BLOOD PRESSURE: 108 MMHG | BODY MASS INDEX: 30.58 KG/M2 | WEIGHT: 162 LBS | HEIGHT: 61 IN

## 2025-02-10 DIAGNOSIS — N30.10 INTERSTITIAL CYSTITIS: Primary | ICD-10-CM

## 2025-02-10 DIAGNOSIS — R10.2 PELVIC PAIN: ICD-10-CM

## 2025-02-10 NOTE — PROGRESS NOTES
"Assessment/Plan:     Diagnoses and all orders for this visit:    Interstitial cystitis    Pelvic pain         39-year-old female  Lower pelvic pain/urgency frequency/IC symptom respond to bladder instillation  Diverticulosis  Smoker  Had robotic TLH BS secondary to adenomyosis  History of trichomoniasis  Plan  To follow-up with GI  To continue bladder diet/Kegel   bladder instillation done today return to office in 1 week consider doing it weekly for 1 to 2 months and monitor for symptom plan explained and discussed with patient all patient questions answered and patient was satisfied      Subjective:      Patient ID: Elis Patterson is a 39 y.o. female.    HPI  Patient seen evaluated present to the office today for bladder instillation secondary to interstitial cystitis/pelvic pain  That responded to bladder instillation  Procedure explained and discussed with patient patient noticed improvement in her pain and symptoms since started the procedure  Denies any urgency frequency or dysuria  Denies any nocturia  All patient questions answered and patient was satisfied  The following portions of the patient's history were reviewed and updated as appropriate: allergies, current medications, past family history, past medical history, past social history, past surgical history and problem list.    Review of Systems      Objective:      /76 (BP Location: Left arm, Patient Position: Sitting, Cuff Size: Adult)   Ht 5' 1\" (1.549 m)   Wt 73.5 kg (162 lb)   LMP 09/26/2023   BMI 30.61 kg/m²          Physical Exam      Universal Protocol:  Consent: Verbal consent obtained.  Risks and benefits: risks, benefits and alternatives were discussed  Consent given by: patient  Time out: Immediately prior to procedure a \"time out\" was called to verify the correct patient, procedure, equipment, support staff and site/side marked as required.  Patient understanding: patient states understanding of the procedure being " performed  Patient consent: the patient's understanding of the procedure matches consent given  Procedure consent: procedure consent matches procedure scheduled  Patient identity confirmed: verbally with patient     Bladder catheterization     Date/Time: 2/10/2025  3.15pm     Performed by: Brett Everett MD  Authorized by: Brett Everett MD    Consent:     Consent given by:  Patient  Universal protocol:     Procedure explained and questions answered to patient or proxy's satisfaction: yes      Immediately prior to procedure a time out was called: yes      Patient identity confirmed:  Verbally with patient  Pre-procedure details:     Procedure purpose:  Therapeutic    Preparation: Patient was prepped and draped in usual sterile fashion        Anesthesia method:  Topical application    Topical anesthetic:  Lidocaine gel  Procedure details:     Bladder irrigation: yes      Catheter insertion:  Non-indwelling    Approach: natural orifice      Catheter type:  Non-latex    Catheter size:  8 Fr    Number of attempts:  1    Successful placement: yes      Urine characteristics:  Clear    Provider performed due to:  Nurse unavailable  Post-procedure details:     Patient tolerance of procedure:  Tolerated well, no immediate complications  Comments:      After 8 Yoruba catheter was inserted 55 cc of clear urine was drained from the bladder following the bladder instillation was performed using 40,000 international unit heparin mixed with 10 mL of sterile water mixed with 5 mL of bicarb followed by 3 cc of lidocaine gel  Patient tolerated procedure well patient instructed to hold her urine for 3 hours then return to office in 1 week

## 2025-02-14 ENCOUNTER — OFFICE VISIT (OUTPATIENT)
Dept: PHYSICAL THERAPY | Facility: CLINIC | Age: 40
End: 2025-02-14
Payer: COMMERCIAL

## 2025-02-14 DIAGNOSIS — M54.42 CHRONIC BILATERAL LOW BACK PAIN WITH BILATERAL SCIATICA: Primary | ICD-10-CM

## 2025-02-14 DIAGNOSIS — M25.551 BILATERAL HIP PAIN: ICD-10-CM

## 2025-02-14 DIAGNOSIS — M54.41 CHRONIC BILATERAL LOW BACK PAIN WITH BILATERAL SCIATICA: Primary | ICD-10-CM

## 2025-02-14 DIAGNOSIS — G89.29 CHRONIC BILATERAL LOW BACK PAIN WITH BILATERAL SCIATICA: Primary | ICD-10-CM

## 2025-02-14 DIAGNOSIS — M25.552 BILATERAL HIP PAIN: ICD-10-CM

## 2025-02-14 PROCEDURE — 97110 THERAPEUTIC EXERCISES: CPT

## 2025-02-14 NOTE — PROGRESS NOTES
Daily Note     Today's date: 2025  Patient name: Elis Patterson  : 1985  MRN: [id number]  Referring provider: Korin Caballero MD  Dx:   Encounter Diagnosis     ICD-10-CM    1. Chronic bilateral low back pain with bilateral sciatica  M54.42     M54.41     G89.29       2. Bilateral hip pain  M25.551     M25.552           Start Time: 0732  Stop Time: 0800  Total time in clinic (min): 28 minutes    Subjective: Pt reports that she is having 7/10 low back pain and 5/10 L knee pain today. Reports that sciatic nerve glide has been bothering her neck.       Objective: See treatment diary below      Assessment: Low back pain reduced to 5/10 and L knee pain reduced to 2/10 after 2x10 prone press up. L knee pain further reduced to 1/10 after prone press up with pt overpressure, however low back pain did not change. Prescribed HEP of prone press up with pt overpressure for HEP, removed sciatic nerve glide. All treatments tolerated well, no adverse response.       Plan: Continue per plan of care.      Precautions: none    [email or web address]  Access Code: [email or web address]  Sciatic nerve glide  Seated hip IR stretch    Manuals 12/19 12/23 1/3 1/6 1/17 1/31 2/14      Lumbar CPA       L1-L5 grade IV                                             Neuro Re-Ed             Sciatic nerve glide X10 B X10 B    2x10 B       PPT    x10         PPT + 3 breaths    x10         PPT + march    x10         Paloff press    Standing  2x10 B Tband (blue) Standing 2x15 Tband (blue) seated 2x15 B                                 Ther Ex             Seated hip IR stretch X10 ea X10 ea           Seated figure 4 stretch X30sec ea            LTR  19c9hwn B   45q6kal B X10 B       Knee to chest stretch  1k04ipc B           Open book  X15 B X15 B          Prone on elbows   x1min          Prone lying   x1min;  2x2min x1min         Standing repeated lumbar ext   2x10 x10         Prone press up       2x10  2x10 w/ pt OP      Seated thoracic  extension w/ lumbar roll       2x10      Thoracic extension on foam roll       2x5       Standing hip ext   X15 B  Bent forward on table 2x10        Glute bridge    2x10 2x10        Seated lumbar ext w/ Taras roll     2x10        Lumbar L side glide on wall     2x10        Edu      Re: objective findings       Ther Activity             Total gym squat  Lvl14  3x15           Leg press   60# x15  65# x22          Gait Training                                       Modalities             Moist heat  3 min

## 2025-02-18 ENCOUNTER — PROCEDURE VISIT (OUTPATIENT)
Dept: OBGYN CLINIC | Facility: CLINIC | Age: 40
End: 2025-02-18
Payer: COMMERCIAL

## 2025-02-18 VITALS
BODY MASS INDEX: 30.58 KG/M2 | HEIGHT: 61 IN | DIASTOLIC BLOOD PRESSURE: 72 MMHG | SYSTOLIC BLOOD PRESSURE: 110 MMHG | WEIGHT: 162 LBS

## 2025-02-18 DIAGNOSIS — N30.10 INTERSTITIAL CYSTITIS: Primary | ICD-10-CM

## 2025-02-18 PROCEDURE — 51700 IRRIGATION OF BLADDER: CPT | Performed by: OBSTETRICS & GYNECOLOGY

## 2025-02-18 NOTE — PROGRESS NOTES
"Assessment/Plan:     There are no diagnoses linked to this encounter.      39-year-old female  Lower pelvic pain/urgency frequency/IC symptom respond to bladder instillation  Diverticulosis  Smoker  Had robotic TLH BS secondary to adenomyosis  History of trichomoniasis  Plan  To follow-up with GI  To continue bladder diet/Kegel   bladder instillation done today return to office in 1 week consider doing it weekly for 1 to 2 months and monitor for symptom plan explained and discussed with patient all patient questions answered and patient was satisfied       Subjective:      Patient ID: Elis Patterson is a 39 y.o. female.    HPI  Patient seen evaluated present to the office today for bladder instillation secondary to interstitial cystitis  That respond to bladder instillation  Desires to continue  Procedure explained and discussed with patient all patient questions answered and patient was satisfied  The following portions of the patient's history were reviewed and updated as appropriate: allergies, current medications, past family history, past medical history, past social history, past surgical history and problem list.    Review of Systems      Objective:      /72 (BP Location: Left arm, Patient Position: Sitting, Cuff Size: Adult)   Ht 5' 1\" (1.549 m)   Wt 73.5 kg (162 lb)   LMP 09/26/2023   BMI 30.61 kg/m²          Physical Exam      Universal Protocol:  Consent: Verbal consent obtained.  Risks and benefits: risks, benefits and alternatives were discussed  Consent given by: patient  Time out: Immediately prior to procedure a \"time out\" was called to verify the correct patient, procedure, equipment, support staff and site/side marked as required.  Patient understanding: patient states understanding of the procedure being performed  Patient consent: the patient's understanding of the procedure matches consent given  Procedure consent: procedure consent matches procedure scheduled  Patient identity " confirmed: verbally with patient     Bladder catheterization     Date/Time: 2/18/2025 3.45pm     Performed by: Brett Everett MD  Authorized by: Brett Everett MD    Consent:     Consent given by:  Patient  Universal protocol:     Procedure explained and questions answered to patient or proxy's satisfaction: yes      Immediately prior to procedure a time out was called: yes      Patient identity confirmed:  Verbally with patient  Pre-procedure details:     Procedure purpose:  Therapeutic    Preparation: Patient was prepped and draped in usual sterile fashion         Anesthesia method:  Topical application    Topical anesthetic:  Lidocaine gel  Procedure details:     Bladder irrigation: yes      Catheter insertion:  Non-indwelling    Approach: natural orifice      Catheter type:  Non-latex    Catheter size:  8 Fr    Number of attempts:  1    Successful placement: yes      Urine characteristics:  Clear    Provider performed due to:  Nurse unavailable  Post-procedure details:     Patient tolerance of procedure:  Tolerated well, no immediate complications  Comments:      After 8 Nepali catheter was inserted 45 cc of clear urine was drained from the bladder following the bladder instillation was performed using 40,000 international unit heparin mixed with 10 mL of sterile water mixed with 5 mL of bicarb followed by 3 cc of lidocaine gel  Patient tolerated procedure well patient instructed to hold her urine for 3 hours then return to office in 1 week

## 2025-02-20 ENCOUNTER — OFFICE VISIT (OUTPATIENT)
Dept: PHYSICAL THERAPY | Facility: REHABILITATION | Age: 40
End: 2025-02-20
Payer: COMMERCIAL

## 2025-02-20 DIAGNOSIS — R10.2 PELVIC PAIN: Primary | ICD-10-CM

## 2025-02-20 PROCEDURE — 97140 MANUAL THERAPY 1/> REGIONS: CPT | Performed by: PHYSICAL THERAPIST

## 2025-02-20 PROCEDURE — 97112 NEUROMUSCULAR REEDUCATION: CPT | Performed by: PHYSICAL THERAPIST

## 2025-02-20 PROCEDURE — 97530 THERAPEUTIC ACTIVITIES: CPT | Performed by: PHYSICAL THERAPIST

## 2025-02-20 NOTE — PROGRESS NOTES
Daily Note     Today's date: 2025  Patient name: Elis Patterson  : 1985  MRN: [id number]  Referring provider: Brett Everett MD  Dx:   Encounter Diagnosis     ICD-10-CM    1. Pelvic pain  R10.2           Start Time: 1600  Stop Time: 1653  Total time in clinic (min): 53 minutes    Subjective: The patient reports that she has had 4-5 bladder instillations so far. She is not going next week because Dr. Everett is away. She does have a few more scheduled. She feels that her pelvic pain is getting a little better, but she still has it. She does notice a small improvement in her urinary frequency and urgency, but does still have some pain with emptying. After her last PT session 3 weeks ago, she notes having pelvic pain that lasted for about one week which started in her suprapubic region and wrapped around to her lower back. She has been trying to follow the IC diet and avoid irritants. She is scheduled for a PT re-assessment for her low back and hips next week.       Objective: See treatment diary below      Assessment: Tolerated treatment well. Patient  demonstrates pelvic floor muscle tension today, R > L. Performed gentle pelvic floor muscle releases today. No pain or tenderness during treatment session today. She had no complaints of low back or hip pain while positioned on the treatment table. Assess overall response to session next visit.  She will return in one week for her next visit.       Plan: Continue per plan of care.    Precautions: anxiety/depression; hysterectomy 2024; asthma  [employer] HEP: [id number]   Treatment Goals:         IE RE   [place]-18     PFDI-20     V-Q 39.39    PGQ         Date                                                 Manuals             Scar mobilization             STM mobilization             Abdominal Tri Planar Myofascial release    10 min 10 min        Pelvic Floor Muscle Releases  10 min  10 min 10 min                                  Patient  Education             Neuro Re-Ed             Biofeedback   20 min          TA ADIM             PFMC slow holds             Diaphragmatic Breathing  10 min Done  done 10 min with PFM relaxation        DKC with Diaphragmatic Breathing  15 sec x 3 ea hold            Child's Pose with DiaphragmaticBreathing  15 sec x 3 ea hold          Body Scan for PFM release/relaxatoin  8 min           Pelvic Floor Drops in deep squat             PFM contractions  10x supine/hooklying 10 x supine    5x sitting 10x supine         DB seated on pball   2 min          Pelvic circles on pball for PFM relaxation   10x cw/ccw 10x cw/ccw 10x cw/ccw                                  Ther Ex             PPT             LTR             Cat Cow             UL Butterfly Stretch             Butterfly stretch             Pball pelvic tilts             Pball pelvic circles             Pball hip add stretch                                                                 Ther Activity             EDUCATION/COUNSELING 20 min 10 min 15 min 15 min 15 min        Dilator Training             Gait Training                                       Modalities

## 2025-02-27 ENCOUNTER — OFFICE VISIT (OUTPATIENT)
Dept: PHYSICAL THERAPY | Facility: REHABILITATION | Age: 40
End: 2025-02-27
Payer: COMMERCIAL

## 2025-02-27 DIAGNOSIS — R10.2 PELVIC PAIN: Primary | ICD-10-CM

## 2025-02-27 PROCEDURE — 97140 MANUAL THERAPY 1/> REGIONS: CPT | Performed by: PHYSICAL THERAPIST

## 2025-02-27 PROCEDURE — 97112 NEUROMUSCULAR REEDUCATION: CPT | Performed by: PHYSICAL THERAPIST

## 2025-02-27 NOTE — PROGRESS NOTES
Daily Note     Today's date: 2025  Patient name: Elis Patterson  : 1985  MRN: [id number]  Referring provider: Brett Everett MD  Dx:   Encounter Diagnosis     ICD-10-CM    1. Pelvic pain  R10.2           Start Time: 1600  Stop Time: 1650  Total time in clinic (min): 50 minutes    Subjective: The patient notes that her pelvic and low back pain have not been too bad over the last week. She did not have a bladder instillation this week as Dr. Everett was away.       Objective: See treatment diary below      Assessment: Tolerated treatment well. Patient  does continue to demonstrate some pelvic floor muscle tension. She notes some discomfort and pressure with manual therapy techniques today, but no pain.  Worked on diaphragmatic breathing for PFM relaxation and PFM engagement. She demonstrated weakness with PFM engagement today and some glute compensation. She will return in one week for her next visit.       Plan: Continue per plan of care.      Precautions: anxiety/depression; hysterectomy 2024; asthma  Medbridge HEP: [id number]   Treatment Goals:         IE RE   [place]-18     PFDI-20     V-Q 39.39    PGQ         Date                                               Manuals             Scar mobilization             STM mobilization             Abdominal Tri Planar Myofascial release    10 min 10 min 10 min       Pelvic Floor Muscle Releases  10 min  10 min 10 min 10 min                                 Patient Education             Neuro Re-Ed             Biofeedback   20 min          TA ADIM             PFMC slow holds      5x       Diaphragmatic Breathing  10 min Done  done 10 min with PFM relaxation 5 min with PFM relaxation       DKC with Diaphragmatic Breathing  15 sec x 3 ea hold            Child's Pose with DiaphragmaticBreathing  15 sec x 3 ea hold          Body Scan for PFM release/relaxatoin  8 min           Pelvic Floor Drops in deep squat             PFM contractions   10x supine/hooklying 10 x supine    5x sitting 10x supine         DB seated on pball   2 min          Pelvic circles on pball for PFM relaxation   10x cw/ccw 10x cw/ccw 10x cw/ccw 15x cw/ccw                                 Ther Ex             PPT             LTR             Cat Cow             UL Butterfly Stretch             Butterfly stretch             Pball pelvic tilts             Pball pelvic circles             Pball hip add stretch                                                                 Ther Activity             EDUCATION/COUNSELING 20 min 10 min 15 min 15 min 15 min        Dilator Training             Gait Training                                       Modalities

## 2025-02-27 NOTE — PROGRESS NOTES
Pre-charting for Appointment      Reason for being seen today: Bladder Instillation.    Any current testing/imaging done prior to exam today:

## 2025-02-28 ENCOUNTER — EVALUATION (OUTPATIENT)
Dept: PHYSICAL THERAPY | Facility: CLINIC | Age: 40
End: 2025-02-28
Payer: COMMERCIAL

## 2025-02-28 DIAGNOSIS — M25.552 BILATERAL HIP PAIN: ICD-10-CM

## 2025-02-28 DIAGNOSIS — M54.2 NECK PAIN: ICD-10-CM

## 2025-02-28 DIAGNOSIS — M54.42 CHRONIC BILATERAL LOW BACK PAIN WITH BILATERAL SCIATICA: Primary | ICD-10-CM

## 2025-02-28 DIAGNOSIS — M25.562 CHRONIC PAIN OF LEFT KNEE: ICD-10-CM

## 2025-02-28 DIAGNOSIS — M79.671 RIGHT FOOT PAIN: ICD-10-CM

## 2025-02-28 DIAGNOSIS — M54.41 CHRONIC BILATERAL LOW BACK PAIN WITH BILATERAL SCIATICA: Primary | ICD-10-CM

## 2025-02-28 DIAGNOSIS — G89.29 CHRONIC BILATERAL LOW BACK PAIN WITH BILATERAL SCIATICA: Primary | ICD-10-CM

## 2025-02-28 DIAGNOSIS — M25.551 BILATERAL HIP PAIN: ICD-10-CM

## 2025-02-28 DIAGNOSIS — G89.29 CHRONIC PAIN OF LEFT KNEE: ICD-10-CM

## 2025-02-28 PROCEDURE — 97110 THERAPEUTIC EXERCISES: CPT

## 2025-02-28 NOTE — PROGRESS NOTES
PT Re-Evaluation     Today's date: 2025  Patient name: Elis Patterson  : 1985  MRN: [id number]  Referring provider: Korin Caballero MD  Dx:   Encounter Diagnosis     ICD-10-CM    1. Chronic bilateral low back pain with bilateral sciatica  M54.42     M54.41     G89.29       2. Bilateral hip pain  M25.551     M25.552       3. Chronic pain of left knee  M25.562     G89.29       4. Right foot pain  M79.671       5. Neck pain  M54.2               Start Time: 0755  Stop Time: 08  Total time in clinic (min): 38 minutes    Assessment  Impairments: abnormal muscle tone, abnormal or restricted ROM, abnormal movement, activity intolerance, impaired balance, impaired physical strength, lacks appropriate home exercise program, pain with function, participation limitations and activity limitations  Symptom irritability: moderate    Assessment details: Elis has made great progress with her hip pain, however low back, knee, and ankle pain persist. Overall, pt does not report significant improvement in function, as her pain is still bad at the end of the day (especially in the L knee). Treatment today was focused on addressing low back and L knee pain. Pain levels in L knee/shin decreased significantly with prone press up and open book exercises; educated on importance of performance of HEP as prescribed. Low back pain was not affected by lumbar or thoracic spine intervention; plan to address neck at next appointment.  Pt would benefit from continued skilled PT services to address impairments and improve ability to participate in functional activities in the community.  Understanding of Dx/Px/POC: fair     Prognosis: fair    Goals  Short term (1-3 weeks)  1. Pt will demo consistent and independent performance of progressive HEP. -- progressing 75%  2. Pt will demo 1/3 or greater improvement in hip flexion MMT. - met 100%  Long term (4-8 weeks)  1. Pt will demo 4+ or greater hip flexion MMT bilaterally. - met  100%  2. Pt will demo 2/10 or lower low back pain with forward bending. - progressing 25%  3. Pt will demo negative slump test bilaterally. - not met  4. Pt will demo ability to lift 25lbs from floor to waist level. - progressing 50%      Plan  Patient would benefit from: skilled physical therapy    Planned therapy interventions: abdominal trunk stabilization, joint mobilization, manual therapy, neuromuscular re-education, breathing training, strengthening, flexibility, stretching, functional ROM exercises, therapeutic activities, gait training, therapeutic exercise and home exercise program    Frequency: 1-2x week  Duration in weeks: 10  Plan of Care beginning date: 2/28/2025  Plan of Care expiration date: 5/9/2025  Treatment plan discussed with: patient      Subjective Evaluation    History of Present Illness  Mechanism of injury: Re-eval (2/28/25):  Pt reports that she has made about 50% improvement since starting PT for back and LE pain. Reports that B hips are feeling good now. Reports that low back, L knee, and R foot/ankle are primary areas to work on. Reports that pain is always worse after a day of work. Reports that she has been performing press up exercise, which helps with pain. Reports that she also has neck pain that affects her throughout the day; reports that pain starts at the midneck and travels down to lower back.    Patient Goals  Patient goals for therapy: increased strength, decreased pain, increased motion and return to sport/leisure activities        Objective     AROM R L   Lumbar flexion WNL   Lumbar extension WNL   Lumbar side bend WNL WNL   Lumbar rotation WNL WNL     AROM R L   Hip flexion 120 100   Hip extension     Hip abduction     Hip adduction     Hip ER 50 50   Hip IR 45 45   Knee flexion WFL WFL   Knee extension 2 deg hyper 2 deg hyper   Ankle DF WFL WFL   Ankle PF WFL WFL   Ankle inversion     Ankle eversion       MMT R L   Hip flexion 5 4+   Hip extension     Hip abduction      Hip adduction     Hip ER     Hip IR     Knee flexion 5 4+ (pain in anterior knee)   Knee extension 5 5   Ankle DF 5 5   Ankle PF     Ankle inversion     Ankle eversion       Slump test: (+) bilaterally    Straight leg raise: (-) B    5x STS: 10 sec       Precautions: none    [email or web address]  [email or web address]: [email or web address]  Sciatic nerve glide  Seated hip IR stretch    Manuals 12/19 12/23 1/3 1/6 1/17 1/31 2/14 2/28     Lumbar CPA       L1-L5 grade IV                                             Neuro Re-Ed             Sciatic nerve glide X10 B X10 B    2x10 B       PPT    x10         PPT + 3 breaths    x10         PPT + march    x10         Paloff press    Standing  2x10 B Tband (blue) Standing 2x15 Tband (blue) seated 2x15 B                                 Ther Ex             Seated hip IR stretch X10 ea X10 ea           Seated figure 4 stretch X30sec ea            LTR  [id number] B   [id number] B X10 B       Knee to chest stretch  [id number] B           Open book  X15 B X15 B     2x10 L     Prone on elbows   x1min          Prone lying   x1min;  2x2min x1min         Standing repeated lumbar ext   2x10 x10         Prone press up       2x10  2x10 w/ pt OP X10 w/ pt OP (knee pain decrease 4/10 to 2/10     Seated thoracic extension w/ lumbar roll       2x10 2x10     Thoracic extension on foam roll       2x5       Seated thoracic flexion        2x10 (stop)     Standing hip ext   X15 B  Bent forward on table 2x10        Glute bridge    2x10 2x10        Seated lumbar ext w/ Taras roll     2x10        Lumbar L side glide on wall     2x10        Edu      Re: objective findings  Re: objective findings, HEP, POC     Ther Activity             Total gym squat  Lvl14  3x15           Leg press   60# x15  65# x22          Gait Training                                       Modalities             Moist heat  3 min

## 2025-03-04 ENCOUNTER — PROCEDURE VISIT (OUTPATIENT)
Dept: OBGYN CLINIC | Facility: CLINIC | Age: 40
End: 2025-03-04
Payer: COMMERCIAL

## 2025-03-04 VITALS — DIASTOLIC BLOOD PRESSURE: 62 MMHG | BODY MASS INDEX: 29.85 KG/M2 | WEIGHT: 158 LBS | SYSTOLIC BLOOD PRESSURE: 104 MMHG

## 2025-03-04 DIAGNOSIS — N30.10 IC (INTERSTITIAL CYSTITIS): Primary | ICD-10-CM

## 2025-03-04 PROCEDURE — 51700 IRRIGATION OF BLADDER: CPT | Performed by: OBSTETRICS & GYNECOLOGY

## 2025-03-05 NOTE — PROGRESS NOTES
"Assessment/Plan:     Diagnoses and all orders for this visit:    IC (interstitial cystitis)        39-year-old female  Lower pelvic pain/urgency frequency/IC symptom respond to bladder instillation  Diverticulosis  Smoker  Had robotic TLH BS secondary to adenomyosis  History of trichomoniasis  Plan  To follow-up with GI  To continue bladder diet/Kegel   bladder instillation done today return to office in 1 week consider doing it weekly for 1 to 2 months and monitor for symptom plan explained and discussed with patient all patient questions answered and patient was satisfied       Subjective:      Patient ID: Elis Patterson is a 39 y.o. female.    HPI  Patient seen evaluated present to the office today for bladder instillation secondary to interstitial cystitis  Patient was complaining of prolonged history of lower pelvic pain patient started bladder instillation and noted improvement in her symptom and frequency of her pelvic pain/dysuria  Denies any urgency frequency  Denies any problem with her bowel movement  Denies any vaginal itching odor or discharge  Patient satisfied the procedure and desires to continue risk-benefit side effect reviewed discussed with patient all patient questions answered and patient was satisfied    The following portions of the patient's history were reviewed and updated as appropriate: allergies, current medications, past family history, past medical history, past social history, past surgical history and problem list.    Review of Systems      Objective:      /62 (BP Location: Left arm, Patient Position: Sitting, Cuff Size: Adult)   Wt 71.7 kg (158 lb)   LMP 09/26/2023   BMI 29.85 kg/m²          Physical Exam    Universal Protocol:  Consent: Verbal consent obtained.  Risks and benefits: risks, benefits and alternatives were discussed  Consent given by: patient  Time out: Immediately prior to procedure a \"time out\" was called to verify the correct patient, procedure, " equipment, support staff and site/side marked as required.  Patient understanding: patient states understanding of the procedure being performed  Patient consent: the patient's understanding of the procedure matches consent given  Procedure consent: procedure consent matches procedure scheduled  Patient identity confirmed: verbally with patient    Bladder catheterization    Date/Time: 3/4/2025 3:30 PM    Performed by: Brett Everett MD  Authorized by: Brett Everett MD    Patient location:  Bedside  Consent:     Consent given by:  Patient  Universal protocol:     Procedure explained and questions answered to patient or proxy's satisfaction: yes      Immediately prior to procedure a time out was called: yes      Patient identity confirmed:  Verbally with patient  Pre-procedure details:     Procedure purpose:  Therapeutic    Preparation: Patient was prepped and draped in usual sterile fashion    Anesthesia (see MAR for exact dosages):     Anesthesia method:  Topical application    Topical anesthetic:  Lidocaine gel  Procedure details:     Bladder irrigation: yes      Catheter insertion:  Non-indwelling    Catheter type:  Non-latex    Catheter size:  8 Fr    Number of attempts:  1    Successful placement: yes      Urine characteristics:  Clear  Post-procedure details:     Patient tolerance of procedure:  Tolerated well, no immediate complications  Comments:      After 8 Amharic catheter was inserted 50 cc of clear urine was drained from the bladder following the bladder instillation was performed using 40,000 international unit heparin mixed with 10 mL of sterile water mixed with 5 mL of bicarb followed by 3 cc of lidocaine gel  Patient tolerated procedure well patient instructed to hold her urine for 3 hours then return to office in 1 week

## 2025-03-06 ENCOUNTER — OFFICE VISIT (OUTPATIENT)
Dept: PHYSICAL THERAPY | Facility: REHABILITATION | Age: 40
End: 2025-03-06
Payer: COMMERCIAL

## 2025-03-06 DIAGNOSIS — R10.2 PELVIC PAIN: Primary | ICD-10-CM

## 2025-03-06 PROCEDURE — 97140 MANUAL THERAPY 1/> REGIONS: CPT | Performed by: PHYSICAL THERAPIST

## 2025-03-06 PROCEDURE — 97530 THERAPEUTIC ACTIVITIES: CPT | Performed by: PHYSICAL THERAPIST

## 2025-03-06 PROCEDURE — 97112 NEUROMUSCULAR REEDUCATION: CPT | Performed by: PHYSICAL THERAPIST

## 2025-03-06 NOTE — PROGRESS NOTES
Daily Note     Today's date: 3/6/2025  Patient name: Elis Patterson  : 1985  MRN: [id number]  Referring provider: Brett Everett MD  Dx:   Encounter Diagnosis     ICD-10-CM    1. Pelvic pain  R10.2           Start Time: 1700  Stop Time: 1745  Total time in clinic (min): 45 minutes    Subjective: The patient notes that she had her bladder instillation this week. She had some slight cramping that night. She is not sure if she noticed a difference since. She notes that overall she is having less frequent pain and slightly less severe pain.       Objective: See treatment diary below      Assessment: Tolerated treatment well. Patient  does demonstrate some pelvic floor muscle tension and responds well to manual therapy techniques. Reduction in tone noted. Some tenderness reported by the patient superficially near the vaginal opening and in the first layer of pelvic floor muscles.  She will return in one week for her next visit. Re-evaluate at that time.       Plan: Continue per plan of care.      Precautions: anxiety/depression; hysterectomy 2024; asthma  Medbridge HEP: [id number]   Treatment Goals:         IE RE   [place]-18     PFDI-20     V-Q 39.39    PGQ         Date 1/9 1/16 1/23 1/30 2/20 2/27 3/6                                             Manuals             Scar mobilization             STM mobilization             Abdominal Tri Planar Myofascial release    10 min 10 min 10 min 10 min      Pelvic Floor Muscle Releases  10 min  10 min 10 min 10 min 10 min                                Patient Education             Neuro Re-Ed             Biofeedback   20 min          TA ADIM             PFMC slow holds      5x       Diaphragmatic Breathing  10 min Done  done 10 min with PFM relaxation 5 min with PFM relaxation       DKC with Diaphragmatic Breathing  15 sec x 3 ea hold            Child's Pose with DiaphragmaticBreathing  15 sec x 3 ea hold          Body Scan for PFM release/relaxatoin  8 min          "  Pelvic Floor Drops in deep squat             PFM contractions  10x supine/hooklying 10 x supine    5x sitting 10x supine         DB seated on pball   2 min    3 min      Pelvic circles on pball for PFM relaxation   10x cw/ccw 10x cw/ccw 10x cw/ccw 15x cw/ccw 20x cw/ccw                                Ther Ex             PPT             LTR             Cat Cow             UL Butterfly Stretch             Butterfly stretch             Pball pelvic tilts             Pball pelvic circles             Pball hip add stretch       10\"x4 ea                                                          Ther Activity             EDUCATION/COUNSELING 20 min 10 min 15 min 15 min 15 min  10 min      Dilator Training             Gait Training                                       Modalities                                                                            "

## 2025-03-10 ENCOUNTER — TELEPHONE (OUTPATIENT)
Age: 40
End: 2025-03-10

## 2025-03-10 ENCOUNTER — TELEPHONE (OUTPATIENT)
Dept: PODIATRY | Facility: CLINIC | Age: 40
End: 2025-03-10

## 2025-03-10 NOTE — TELEPHONE ENCOUNTER
Hello,    Please advise if a forced appointment can be accommodated for the patient:    Call back #: [phone]    Insurance: [employer]    Reason for appointment: heel pain & numbness - Her appointment was canceled by the office.  She is a new patient.  She is having a lot of pain and made the appointment on 2/3/25.  She works until 3:00 every day and she works in [place].   She is available every other Friday.  This Friday she is off and is working next Friday.      Requested doctor and/or location: Dr Yony Fitzpatrick, DPM      Thank you.

## 2025-03-11 ENCOUNTER — PROCEDURE VISIT (OUTPATIENT)
Dept: OBGYN CLINIC | Facility: CLINIC | Age: 40
End: 2025-03-11
Payer: COMMERCIAL

## 2025-03-11 VITALS
HEIGHT: 61 IN | BODY MASS INDEX: 29.72 KG/M2 | SYSTOLIC BLOOD PRESSURE: 112 MMHG | DIASTOLIC BLOOD PRESSURE: 72 MMHG | WEIGHT: 157.4 LBS

## 2025-03-11 DIAGNOSIS — N30.10 INTERSTITIAL CYSTITIS: Primary | ICD-10-CM

## 2025-03-11 PROCEDURE — 51700 IRRIGATION OF BLADDER: CPT | Performed by: OBSTETRICS & GYNECOLOGY

## 2025-03-12 NOTE — PROGRESS NOTES
"Assessment/Plan:     Diagnoses and all orders for this visit:    Interstitial cystitis      39-year-old female  Lower pelvic pain/urgency frequency/IC symptom respond to bladder instillation  Diverticulosis  Smoker  Had robotic TLH BS secondary to adenomyosis  History of trichomoniasis  Plan  To follow-up with GI  To continue bladder diet/Kegel   bladder instillation done today return to office in 1 week consider doing it weekly for 1 to 2 months and monitor for symptom plan explained and discussed with patient all patient questions answered and patient was satisfied    Subjective:      Patient ID: Elis Patterson is a 39 y.o. female.    HPI  Patient seen evaluated present to the office today for bladder instillation secondary to interstitial cystitis that is responding to BI  Patient was complaining of prolonged history of lower pelvic pain patient started bladder instillation and noted improvement in her symptom and frequency of her pelvic pain/dysuria significantly reduce  Denies any urgency frequency  Denies any problem with her bowel movement    Patient satisfied the procedure and desires to continue risk-benefit side effect reviewed discussed with patient all patient questions answered and patient was satisfied    The following portions of the patient's history were reviewed and updated as appropriate: allergies, current medications, past family history, past medical history, past social history, past surgical history and problem list.    Review of Systems      Objective:      /72 (BP Location: Left arm, Patient Position: Sitting, Cuff Size: Adult)   Ht 5' 1\" (1.549 m)   Wt 71.4 kg (157 lb 6.4 oz)   LMP 09/26/2023   BMI 29.74 kg/m²          Physical Exam    Universal Protocol:  Consent: Verbal consent obtained.  Risks and benefits: risks, benefits and alternatives were discussed  Consent given by: patient  Time out: Immediately prior to procedure a \"time out\" was called to verify the correct " patient, procedure, equipment, support staff and site/side marked as required.  Patient understanding: patient states understanding of the procedure being performed  Patient consent: the patient's understanding of the procedure matches consent given  Procedure consent: procedure consent matches procedure scheduled  Patient identity confirmed: verbally with patient    Bladder catheterization    Date/Time: 3/11/2025 3:30 PM    Performed by: Brett Everett MD  Authorized by: Brett Everett MD    Patient location:  Bedside  Consent:     Consent given by:  Patient  Universal protocol:     Procedure explained and questions answered to patient or proxy's satisfaction: yes      Immediately prior to procedure a time out was called: yes      Patient identity confirmed:  Verbally with patient  Pre-procedure details:     Procedure purpose:  Therapeutic    Preparation: Patient was prepped and draped in usual sterile fashion    Anesthesia (see MAR for exact dosages):     Anesthesia method:  Topical application    Topical anesthetic:  Lidocaine gel  Procedure details:     Bladder irrigation: yes      Catheter insertion:  Non-indwelling    Approach: natural orifice      Catheter type:  Non-latex    Catheter size:  8 Fr    Urine characteristics:  Clear    Provider performed due to:  Nurse unavailable  Post-procedure details:     Patient tolerance of procedure:  Tolerated well, no immediate complications  Comments:       After 8 Kazakh catheter was inserted 40 cc of clear urine was drained from the bladder following the bladder instillation was performed using 40,000 international unit heparin mixed with 10 mL of sterile water mixed with 5 mL of bicarb followed by 3 cc of lidocaine gel  Patient tolerated procedure well patient instructed to hold her urine for 3 hours then return to office in 1 week

## 2025-03-13 ENCOUNTER — EVALUATION (OUTPATIENT)
Dept: PHYSICAL THERAPY | Facility: REHABILITATION | Age: 40
End: 2025-03-13
Payer: COMMERCIAL

## 2025-03-13 DIAGNOSIS — R10.2 PELVIC PAIN: Primary | ICD-10-CM

## 2025-03-13 PROCEDURE — 97530 THERAPEUTIC ACTIVITIES: CPT | Performed by: PHYSICAL THERAPIST

## 2025-03-13 PROCEDURE — 97140 MANUAL THERAPY 1/> REGIONS: CPT | Performed by: PHYSICAL THERAPIST

## 2025-03-13 NOTE — PROGRESS NOTES
PT Re-Evaluation     Today's date: 3/13/2025  Patient name: Elis Patterson  : 1985  MRN: [id number]  Referring provider: Brett Everett MD  Dx:   Encounter Diagnosis     ICD-10-CM    1. Pelvic pain  R10.2           Start Time: 1605  Stop Time: 1700  Total time in clinic (min): 55 minutes    Assessment  Impairments: abnormal coordination, abnormal or restricted ROM, activity intolerance, impaired physical strength, lacks appropriate home exercise program, pain with function, poor posture  and poor body mechanics    Assessment details: The patient is a 39 y.o. female with complaints of pelvic pain for the past 6 months or so. Her history includes a total hysterectomy (ovary sparing) in 2024 due to adenomyosis and fibroids. Her menstrual history includes heavy periods. She has been treated for a total of 8 vistis including today and her IE on . She presents with some tenderness throughout the pelvic floor R > L. This has improved overall. She also has some improved pelvic floor muscle tone as well. She continues to demonstrate pelvic floor weakness as well. She has noticed some additional improvement with receiving bladder instillations as well. She would benefit from continuing pelvic floor physical therapy to help reduce/manage pain and symptoms, address impairments and maximize function and quality of life upon discharge. She will be given updated HEP throughout episode of care.     Therapeutic activities performed upon examination included education regarding pelvic floor anatomy, explanation of exam technique, explanation of exam findings and discussion of treatment plan as well as expectations of the patient to emphasize the importance of compliance and adherence to physical therapy visits.               Understanding of Dx/Px/POC: good    Goals  ST. The patient will reduce pelvic floor muscle tone by 25 to 50%  in 12 weeks. - met, 25%  2. The patient will improve pelvic floor muscle  strength by 1 grade with improved coordination in 12 weeks. - not met  3. The patient will demonstrate good posture with sitting on toilet to promote proper pelvic floor muscle lengthening.   4. The patient will demonstrate good isolation of respiratory diaphragm and good coordination of diaphragm and transverse abdominis in 8 to 10 weeks.     LT. The patient will be compliant with exercises and self care program to independently manage symptoms upon discharge.   2. The patient will minimize straining for evacuation of bowels upon discharge.   3. The patient will have minimal to no pelvic pain upon discharge.  4. The patient will demonstrate improved bladder voiding and emptying of her bowels upon discharge.       Plan  Patient would benefit from: skilled physical therapy  Planned modality interventions: biofeedback    Planned therapy interventions: activity modification, abdominal trunk stabilization, behavior modification, body mechanics training, breathing training, coordination, flexibility, functional ROM exercises, IADL retraining, manual therapy, neuromuscular re-education, patient education, postural training, self care, strengthening, therapeutic activities and therapeutic exercise    Frequency: 1x week  Duration in weeks: 12  Plan of Care beginning date: 3/13/2025  Plan of Care expiration date: 2025  Treatment plan discussed with: patient      PT Pelvic Floor Subjective:   History of Present Illness:   Update: 3/13: The patient notes that she continues to experience pelvic pain, but it is less severe than previously. She does continue to have pain at work, she is on her feet a lot and is constantly bending and lifting. She notes that overall recovery from her hysterectomy has been ok. She notes her main issue has been pain and trying to figure out how to manage her pain. She has pain daily, intermittently, the intensity varies depending on activity.     Update 25: The patient went for her  second bladder instillation this week. She notes less discomfort with the procedure for the second time. She does feel that she had a little relief after this second instillation. She may have had a little less bladder frequency afterwards. Otherwise, no change in symptoms since beginning PT on 1/9. She does continue to experience low back pain which she attends PT at a different clinic for symptoms related to that.       IE: 1/9/25  The patient notes that she is experiencing ongoing pelvic pain. She notes that after her hysterectomy, she started to notice pelvic pain. She describes the pain as vaginal and abdominal. She experiences pain if she has to urinate or empty her bowels. She does not wake up in the middle of the night, and when she wakes up in the morning her bladder is full and this causes her pain. She does have pain when she empties, but then relief 15 minutes after emptying. She describes the pain as achy and sharp. She saw Dr. Everett recently and she recommended pelvic floor therapy and bladder instillations. She feels that her symptoms are consistent with interstitial cystitis. She is scheduled later this month to follow up with Dr. Everett and receive her first bladder instillation.     She has also been attending PT for her lower back and legs at the [facility]. Mechanism of injury: surgery    Total Hysterectomy: 6/28/24 - almost 8 months post operative  Social Support:     Lives with: lives at home with mom, brother, and sister.    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)    History of Depression: yes  Diet and Exercise:    Diet:balanced nutrition  OB/ gyn History    Gestational History:     Prior Pregnancy: No      Menstrual History:      Birth control: no contraception  Can feel cramping and ovulation symptoms  Hysterectomy: 6/28/24: total hysterectomy   Periods were very irregular and extremely heavy - she notes that they  "were like this for a long time  She did try oral BC and Depo injections to help but this did not help at all with her bleeding  Bladder Function:     Voiding Difficulties positive for: urgency (sometimes), hesitancy (takes time to start her stream; can't always go \"on demand\" unless she is really full) and incomplete emptying (sometimes)      Voiding Difficulties negative for: frequent urination and straining       Voiding Difficulties comments:     Voiding frequency: every 3-4 hours    Urinary leakage: no urine leakage    Nocturia (episodes per night): 0    Painful urination: Yes (pain and burning when she is releasing her bladder, which has improved since instillations)      Fluid Intake Type:  Water    Intake (ounces):     Intake (ounces) comment: Water:  ounces  1 cup of coffee a day  Soda: A few cans a week, not every day    Does not drink as much water as she can at work because she can't  Bowel Function:     Voiding DIfficulties: painful defecating, unfinished feeling after defecating and constipation      Bowel Function comments:  Strain to go and painful to pass  Depends on diet    Bowel frequency: daily    [place] Stool Scale: type 3, type 4, type 5 and type 6    Stool softener use: no stool softeners  Sexual Function:     Sexually Active:  Not sexually active (not currently sexually active since her surgery)    Pain during intercourse: Yes  Patient did experience pain with sex prior to her surgery:  Pain:     Current pain ratin    At best pain ratin    At worst pain ratin    Location:  Vaginal    Onset:  4-6 months ago    Quality:  Dull ache and sharp    Aggravating factors:  [place], exercise, urination, prolonged positions and bowel movements (moving, bending, lifting, has pain with work functions; sitting > standing)    Pain duration: pain is intermittent; can last for a few hours then come back.    Relieving factors:  Medications, heat and ice    Progression:  Improved (not as " frequently severe)      Objective       Abdominal Assessment:      Abdominal Assessment: Soft; no tenderness reported  Some pressure in suprapubic region    Skin inspection:   scars present.   Number of scars: 4  Additional skin inspection details: Laparoscopic incisions - all well healed; no tenderness, redness etc; mobility WNL, sensitivity WNL      General Perineum Exam:   perineum intact.   Negative for swelling, descent, lesion and gaping introitus    General perineum exam comments: Pelvic floor verbal consent and written consent signed and in chart    Education provided today:   Time Spent on Patient Education: 20 min    Pelvic floor anatomy and function  Physiology/relationship of abdominal canister and pelvis/pelvic organs/pelvic floor muscles  Bowel and Bladder anatomy and function    Pelvic and chronic pain education  Surgical and post op considerations  PT exam and course of treatment      Future Education To be Provided:     Visual Inspection of Perineum:   Excursion of perineal body in cephalad direction with contraction of pelvic floor muscles (PFM): weak  Excursion of perineal body in caudal direction with relaxation of pelvic floor muscles (PFM): unable  Involuntary contraction with coughing: no  Involuntary relaxation with bearing down: no  PFM Contraction Comments: [place] palpation: no tenderness noted with palpation today      Pelvic Clock (external muscle palpation): no pain or tenderness noted      [place] swab test: non-tender  Sensation: intact    Pelvic Organ Prolapse   no pelvic organ prolapse  Perineal body inspection: within normal limits        Pelvic Floor Muscle Exam:     Muscle Contraction: overflow, substitution and compensation with glutes and hip adductors   Breathing pattern with contraction: holding breath   Pelvic floor muscle relaxation is incomplete.   75% pelvic floor relaxation        PERFECT Score   Power right: 1/5   Power left: 1/5          pelvic floor exam consent  given by patient    Pelvic exam completed: vaginally     SMEG Biofeedback   Sensor used: external sensors placed perianally  Positioning: supine    power protocol   Secs work/Secs rest/Reps: 2  Max achieved (microvolts): 7  Working average (microvolts): 5  Recruitment: moderate  Holding ability: poor  Derecruitment: fair  Biofeedback comments: Compensation with glutes, breath holding, and hip adductors.   Limited muscle recruitment and coordination noted    Graphical documentation:     Patient did experience pain with sex prior to her surgery           Precautions: anxiety/depression; hysterectomy June 2024; asthma  Medbridge HEP: [id number]   Treatment Goals:         IE RE   [place]-18     PFDI-20     V-Q 39.39    PGQ         Date 1/9 1/16 1/23 1/30 2/20 2/27 3/6 3/13                                            Manuals             Scar mobilization             STM mobilization             Abdominal Tri Planar Myofascial release    10 min 10 min 10 min 10 min      Pelvic Floor Muscle Releases  10 min  10 min 10 min 10 min 10 min 10 min                               Patient Education             Neuro Re-Ed             Biofeedback   20 min          TA ADIM             PFMC slow holds      5x       Diaphragmatic Breathing  10 min Done  done 10 min with PFM relaxation 5 min with PFM relaxation       DKC with Diaphragmatic Breathing  15 sec x 3 ea hold            Child's Pose with DiaphragmaticBreathing  15 sec x 3 ea hold          Body Scan for PFM release/relaxatoin  8 min           Pelvic Floor Drops in deep squat             PFM contractions  10x supine/hooklying 10 x supine    5x sitting 10x supine         DB seated on pball   2 min    3 min      Pelvic circles on pball for PFM relaxation   10x cw/ccw 10x cw/ccw 10x cw/ccw 15x cw/ccw 20x cw/ccw                                Ther Ex             PPT             LTR             Cat Cow             UL Butterfly Stretch             Butterfly stretch             Pball pelvic  "tilts             Pball pelvic circles             Pball hip add stretch       10\"x4 ea                                                          Ther Activity             EDUCATION/COUNSELING 20 min 10 min 15 min 15 min 15 min  10 min 40 min     Dilator Training             Gait Training                                       Modalities                                                                            "

## 2025-03-14 ENCOUNTER — OFFICE VISIT (OUTPATIENT)
Dept: PHYSICAL THERAPY | Facility: CLINIC | Age: 40
End: 2025-03-14
Payer: COMMERCIAL

## 2025-03-14 DIAGNOSIS — M25.562 CHRONIC PAIN OF LEFT KNEE: ICD-10-CM

## 2025-03-14 DIAGNOSIS — M25.552 BILATERAL HIP PAIN: ICD-10-CM

## 2025-03-14 DIAGNOSIS — G89.29 CHRONIC PAIN OF LEFT KNEE: ICD-10-CM

## 2025-03-14 DIAGNOSIS — M54.42 CHRONIC BILATERAL LOW BACK PAIN WITH BILATERAL SCIATICA: Primary | ICD-10-CM

## 2025-03-14 DIAGNOSIS — M54.41 CHRONIC BILATERAL LOW BACK PAIN WITH BILATERAL SCIATICA: Primary | ICD-10-CM

## 2025-03-14 DIAGNOSIS — G89.29 CHRONIC BILATERAL LOW BACK PAIN WITH BILATERAL SCIATICA: Primary | ICD-10-CM

## 2025-03-14 DIAGNOSIS — M79.671 RIGHT FOOT PAIN: ICD-10-CM

## 2025-03-14 DIAGNOSIS — M25.551 BILATERAL HIP PAIN: ICD-10-CM

## 2025-03-14 DIAGNOSIS — M54.2 NECK PAIN: ICD-10-CM

## 2025-03-14 PROCEDURE — 97110 THERAPEUTIC EXERCISES: CPT

## 2025-03-14 PROCEDURE — 97112 NEUROMUSCULAR REEDUCATION: CPT

## 2025-03-14 NOTE — PROGRESS NOTES
Daily Note     Today's date: 3/14/2025  Patient name: Elis Patterson  : 1985  MRN: [id number]  Referring provider: Korin Caballero MD  Dx:   Encounter Diagnosis     ICD-10-CM    1. Chronic bilateral low back pain with bilateral sciatica  M54.42     M54.41     G89.29       2. Bilateral hip pain  M25.551     M25.552       3. Chronic pain of left knee  M25.562     G89.29       4. Right foot pain  M79.671       5. Neck pain  M54.2           Start Time: 0730  Stop Time: 0800  Total time in clinic (min): 30 minutes    Subjective: Pt reports 4/10 low back pain, no hip, knee, or foot/ankle pain. Reports that hips have still been feeling good, but knee and foot/ankle have been remaining the same.      Objective: See treatment diary below      Assessment: Treatment today was focused on lumbar spine mobility exercises and core/LE neuromuscular activation exercises. Pt reported no change in back pain with mobility exercises and appropriate amount of challenge and fatigue with neuro re-ed exercises. Provided clamshell (w/ green Tband), SLR and SL hip abd exercises for HEP. All treatments tolerated well, no adverse response.       Plan: Continue per plan of care.      Precautions: none    [email or web address]  Access Code: [email or web address]  Sciatic nerve glide  Seated hip IR stretch    Manuals 3/14  1/3 1/6 1/17 1/31 2/14 2/28   Lumbar CPA       L1-L5 grade IV                                     Neuro Re-Ed           Sciatic nerve glide      2x10 B     PPT    x10       PPT + 3 breaths    x10       PPT + march    x10       Paloff press    Standing  2x10 B Tband (blue) Standing 2x15 Tband (blue) seated 2x15 B     PPT+clamshell 2x20          PPT+SLR X15 ea  2# x10 ea          SL hip abd 2x10 ea          Ther Ex           Seated hip IR stretch           Seated figure 4 stretch           LTR R 2x15 (no change)    [id number] B X10 B     Knee to chest stretch           Open book   X15 B     2x10 L   Prone on elbows   x1min         Prone lying   x1min;  2x2min x1min       Standing repeated lumbar ext   2x10 x10       Prone press up 3x10 (no change)      2x10  2x10 w/ pt OP X10 w/ pt OP (knee pain decrease 4/10 to 2/10   Double knees to chest 3x15 (no change)          Seated thoracic extension w/ lumbar roll       2x10 2x10   Thoracic extension on foam roll       2x5     Seated thoracic flexion        2x10 (stop)   Standing hip ext   X15 B  Bent forward on table 2x10      Glute bridge    2x10 2x10      Seated lumbar ext w/ [place] roll     2x10      Lumbar L side glide on wall     2x10      Edu      Re: objective findings  Re: objective findings, HEP, POC   Ther Activity           Total gym squat           Leg press   60# x15  65# x22        Gait Training                                 Modalities           Moist heat

## 2025-03-18 ENCOUNTER — PROCEDURE VISIT (OUTPATIENT)
Dept: OBGYN CLINIC | Facility: CLINIC | Age: 40
End: 2025-03-18
Payer: COMMERCIAL

## 2025-03-18 VITALS
SYSTOLIC BLOOD PRESSURE: 110 MMHG | BODY MASS INDEX: 29.27 KG/M2 | HEIGHT: 61 IN | WEIGHT: 155 LBS | DIASTOLIC BLOOD PRESSURE: 80 MMHG

## 2025-03-18 DIAGNOSIS — N30.10 IC (INTERSTITIAL CYSTITIS): Primary | ICD-10-CM

## 2025-03-18 PROCEDURE — 51700 IRRIGATION OF BLADDER: CPT | Performed by: OBSTETRICS & GYNECOLOGY

## 2025-03-18 NOTE — PROGRESS NOTES
"Assessment/Plan:     Diagnoses and all orders for this visit:    IC (interstitial cystitis)  -     Bladder instillation; Future      39-year-old female  Lower pelvic pain/urgency frequency/IC symptom respond to bladder instillation  Diverticulosis  Smoker  Had robotic TLH BS secondary to adenomyosis  History of trichomoniasis  Plan  To follow-up with GI  To continue bladder diet/Kegel   bladder instillation done today return to office in 1 week consider doing it weekly for 1 to 2 months and monitor for symptom plan explained and discussed with patient all patient questions answered and patient was satisfied       Subjective:      Patient ID: Elis Patterson is a 39 y.o. female.    HPI  Patient seen evaluated presented office today for bladder instillation secondary to interstitial cystitis that controlled with bladder instillation currently doing it weekly  Symptom is controlled denies any urgency frequency or dysuria  Denies any pelvic pain  Significantly symptom improved desire to continue procedure explained and discussed with patient all patient questions answered patient was satisfied  The following portions of the patient's history were reviewed and updated as appropriate: allergies, current medications, past family history, past medical history, past social history, past surgical history and problem list.    Review of Systems      Objective:      /80 (BP Location: Left arm, Patient Position: Sitting, Cuff Size: Adult)   Ht 5' 1\" (1.549 m)   Wt 70.3 kg (155 lb)   LMP 09/26/2023   BMI 29.29 kg/m²          Physical Exam    Universal Protocol:  Consent: Verbal consent obtained. Written consent obtained.  Risks and benefits: risks, benefits and alternatives were discussed  Consent given by: patient  Time out: Immediately prior to procedure a \"time out\" was called to verify the correct patient, procedure, equipment, support staff and site/side marked as required.  Patient understanding: patient states " understanding of the procedure being performed  Patient consent: the patient's understanding of the procedure matches consent given  Procedure consent: procedure consent matches procedure scheduled  Patient identity confirmed: verbally with patient    Bladder catheterization    Date/Time: 3/18/2025 3:45 PM    Performed by: Brett Everett MD  Authorized by: Brett Everett MD    Patient location:  Bedside  Consent:     Consent given by:  Patient  Universal protocol:     Procedure explained and questions answered to patient or proxy's satisfaction: yes      Immediately prior to procedure a time out was called: yes      Patient identity confirmed:  Verbally with patient  Pre-procedure details:     Procedure purpose:  Therapeutic    Preparation: Patient was prepped and draped in usual sterile fashion    Anesthesia (see MAR for exact dosages):     Anesthesia method:  Topical application    Topical anesthetic:  Lidocaine gel  Procedure details:     Bladder irrigation: yes      Catheter insertion:  Non-indwelling    Catheter type:  Non-latex    Catheter size:  8 Fr    Number of attempts:  1    Successful placement: yes      Urine characteristics:  Clear    Provider performed due to:  Nurse unavailable  Post-procedure details:     Patient tolerance of procedure:  Tolerated well, no immediate complications  Comments:      After bladder catheterization performed bladder instillation was done using 40,000 international unit of heparin mixed with 10 mL of sterile water mixed with 5 mL of bicarb followed by 3 cc of lidocaine gel injection patient tolerated procedure well

## 2025-03-20 ENCOUNTER — OFFICE VISIT (OUTPATIENT)
Dept: PHYSICAL THERAPY | Facility: REHABILITATION | Age: 40
End: 2025-03-20
Payer: COMMERCIAL

## 2025-03-20 DIAGNOSIS — R10.2 PELVIC PAIN: Primary | ICD-10-CM

## 2025-03-20 PROCEDURE — 97112 NEUROMUSCULAR REEDUCATION: CPT | Performed by: PHYSICAL THERAPIST

## 2025-03-20 PROCEDURE — 97140 MANUAL THERAPY 1/> REGIONS: CPT | Performed by: PHYSICAL THERAPIST

## 2025-03-20 PROCEDURE — 97530 THERAPEUTIC ACTIVITIES: CPT | Performed by: PHYSICAL THERAPIST

## 2025-03-20 NOTE — PROGRESS NOTES
Daily Note     Today's date: 3/20/2025  Patient name: Elis Patterson  : 1985  MRN: [id number]  Referring provider: Brett Everett MD  Dx:   Encounter Diagnosis     ICD-10-CM    1. Pelvic pain  R10.2           Start Time: 1600  Stop Time: 1645  Total time in clinic (min): 45 minutes    Subjective: The patient notes that she has been feeling better with PT and her bladder instillations over the last week. She notes that her pain was a 1/10 at worst over the last week.       Objective: See treatment diary below      Assessment: Tolerated treatment well. Patient  notes some tenderness in the left pelvic floor today. She does demonstrate some tension in the pelvic floor, R > L sides. She does have some reduction in tone overall noted and some further improvement post treatment. She will return on 3/31 for her next visit.       Plan: Continue per plan of care.      Precautions: none    Precautions: anxiety/depression; hysterectomy 2024; asthma  Medbridge HEP: [id number]   Treatment Goals:         IE RE   [place]-18     PFDI-20     V-Q 39.39    PGQ         Date 1/9 1/16 1/23 1/30 2/20 2/27 3/6 3/13 3/20                                           Manuals             Scar mobilization             STM mobilization             Abdominal Tri Planar Myofascial release    10 min 10 min 10 min 10 min  10 min    Pelvic Floor Muscle Releases  10 min  10 min 10 min 10 min 10 min 10 min 10 min                              Patient Education             Neuro Re-Ed             Biofeedback   20 min          TA ADIM             PFMC slow holds      5x       Diaphragmatic Breathing  10 min Done  done 10 min with PFM relaxation 5 min with PFM relaxation       DKC with Diaphragmatic Breathing  15 sec x 3 ea hold            Child's Pose with DiaphragmaticBreathing  15 sec x 3 ea hold          Body Scan for PFM release/relaxatoin  8 min           Pelvic Floor Drops in deep squat             PFM contractions  10x supine/hooklying 10 x  "supine    5x sitting 10x supine         DB seated on pball   2 min    3 min  3 min    Pelvic circles on pball for PFM relaxation   10x cw/ccw 10x cw/ccw 10x cw/ccw 15x cw/ccw 20x cw/ccw  20x cw/ccw                              Ther Ex             PPT             LTR             Cat Cow             UL Butterfly Stretch             Butterfly stretch             Pball pelvic tilts             Pball pelvic circles             Pball hip add stretch       10\"x4 ea  10\"x5 ea                                                        Ther Activity             EDUCATION/COUNSELING 20 min 10 min 15 min 15 min 15 min  10 min 40 min 10 min    Dilator Training             Gait Training                                       Modalities                                                                            "

## 2025-03-28 ENCOUNTER — OFFICE VISIT (OUTPATIENT)
Dept: FAMILY MEDICINE CLINIC | Facility: CLINIC | Age: 40
End: 2025-03-28
Payer: COMMERCIAL

## 2025-03-28 ENCOUNTER — OFFICE VISIT (OUTPATIENT)
Dept: PHYSICAL THERAPY | Facility: CLINIC | Age: 40
End: 2025-03-28
Payer: COMMERCIAL

## 2025-03-28 VITALS
BODY MASS INDEX: 29.83 KG/M2 | DIASTOLIC BLOOD PRESSURE: 72 MMHG | WEIGHT: 158 LBS | RESPIRATION RATE: 16 BRPM | SYSTOLIC BLOOD PRESSURE: 122 MMHG | HEART RATE: 95 BPM | HEIGHT: 61 IN | TEMPERATURE: 97.8 F | OXYGEN SATURATION: 99 %

## 2025-03-28 DIAGNOSIS — G89.29 CHRONIC BILATERAL LOW BACK PAIN WITH BILATERAL SCIATICA: Primary | ICD-10-CM

## 2025-03-28 DIAGNOSIS — M54.2 NECK PAIN: ICD-10-CM

## 2025-03-28 DIAGNOSIS — M25.551 BILATERAL HIP PAIN: ICD-10-CM

## 2025-03-28 DIAGNOSIS — M79.671 RIGHT FOOT PAIN: ICD-10-CM

## 2025-03-28 DIAGNOSIS — G89.29 CHRONIC PAIN OF LEFT KNEE: ICD-10-CM

## 2025-03-28 DIAGNOSIS — M54.42 CHRONIC BILATERAL LOW BACK PAIN WITH BILATERAL SCIATICA: Primary | ICD-10-CM

## 2025-03-28 DIAGNOSIS — M25.562 CHRONIC PAIN OF LEFT KNEE: ICD-10-CM

## 2025-03-28 DIAGNOSIS — M54.41 CHRONIC BILATERAL LOW BACK PAIN WITH BILATERAL SCIATICA: Primary | ICD-10-CM

## 2025-03-28 DIAGNOSIS — M25.552 BILATERAL HIP PAIN: ICD-10-CM

## 2025-03-28 DIAGNOSIS — M62.830 SPASM OF BOTH TRAPEZIUS MUSCLES: Primary | ICD-10-CM

## 2025-03-28 PROCEDURE — 97112 NEUROMUSCULAR REEDUCATION: CPT

## 2025-03-28 PROCEDURE — 99213 OFFICE O/P EST LOW 20 MIN: CPT | Performed by: FAMILY MEDICINE

## 2025-03-28 PROCEDURE — 97110 THERAPEUTIC EXERCISES: CPT

## 2025-03-28 RX ORDER — MELOXICAM 15 MG/1
15 TABLET ORAL DAILY PRN
Qty: 30 TABLET | Refills: 3 | Status: SHIPPED | OUTPATIENT
Start: 2025-03-28

## 2025-03-28 RX ORDER — METHOCARBAMOL 500 MG/1
500 TABLET, FILM COATED ORAL 3 TIMES DAILY
Qty: 30 TABLET | Refills: 1 | Status: SHIPPED | OUTPATIENT
Start: 2025-03-28

## 2025-03-28 NOTE — PROGRESS NOTES
PT Re-Evaluation     Today's date: 3/28/2025  Patient name: Elis Patterson  : 1985  MRN: [id number]  Referring provider: Korin Caballero MD  Dx:   Encounter Diagnosis     ICD-10-CM    1. Chronic bilateral low back pain with bilateral sciatica  M54.42     M54.41     G89.29       2. Bilateral hip pain  M25.551     M25.552       3. Chronic pain of left knee  M25.562     G89.29       4. Right foot pain  M79.671       5. Neck pain  M54.2         Start Time: 730  Stop Time: 816  Total time in clinic (min): 46 minutes    Assessment  Impairments: abnormal muscle tone, abnormal or restricted ROM, abnormal movement, activity intolerance, impaired balance, impaired physical strength, lacks appropriate home exercise program, pain with function, participation limitations and activity limitations  Symptom irritability: moderate    Assessment details: Elis has continued to maintain consistent decrease in hip pain, however low back, knee, and ankle pain persist. She does not report significant improvement in function, as her pain is still bad at the end of the day. Strength, range of motion, and functional testing has not improved significantly. As she reports that neck and UE pain are now more restrictive for her, I evaluated neck and shoulders more closely today; unable to definitively determine, but I suspect derangement syndrome with directional preference into L side bend. Will continue to assess at future appointments. Educated pt on examination findings. Neurological screen negative for UMNL. No outside referral is indicated at this time.   Understanding of Dx/Px/POC: fair     Prognosis: fair    Goals  Short term (1-3 weeks)  1. Pt will demo consistent and independent performance of progressive HEP. -- progressing 75%  2. Pt will demo 1/3 or greater improvement in hip flexion MMT. - met 100%  Long term (4-8 weeks)  1. Pt will demo 4+ or greater hip flexion MMT bilaterally. - met 100%  2. Pt will demo 2/10 or  lower low back pain with forward bending. - progressing 25%  3. Pt will demo negative slump test bilaterally. - met 100%  4. Pt will demo ability to lift 25lbs from floor to waist level. - progressing 50%  5. Pt will demo ability to reach behind her back without pain in LUE.  6. Pt will demo ability to look to the side without neck pain bilaterally.      Plan  Patient would benefit from: skilled physical therapy    Planned therapy interventions: abdominal trunk stabilization, joint mobilization, manual therapy, neuromuscular re-education, breathing training, strengthening, flexibility, stretching, functional ROM exercises, therapeutic activities, gait training, therapeutic exercise and home exercise program    Frequency: 1-2x week  Duration in weeks: 10  Plan of Care beginning date: 2/28/2025  Plan of Care expiration date: 5/9/2025  Treatment plan discussed with: patient        Subjective Evaluation    History of Present Illness  Mechanism of injury: Re-eval (3/28/25):  Pt reports that in the past month she has been making some improvements with low back pain, as the pain is not as constant. She reports that she has some days where she does not have low back pain. She reports that she now has 2-3 days per week where she does not have low back pain. She reports that her hips are still feeling good; have not bothered her for a while. Reports that L knee and R foot/ankle pain is staying about the same. Reports that she has not felt improvement with navigating stairs and bending forward. Pt reports that she is experiencing neck pain at the CT junction that spreads across the tops of the shoulders and down the shoulder blades. She describes the pain as sharp. All movements make the pain worse. Pain is constant (best 2/10), currently 5/10, at worst 9/10. Reports that last week, she experienced 3 days where she experienced pain in R>L shoulder that would shoot down the RUE to her hand. She reports that she occasionally  experiences pain shooting down the LUE. Reports that pain is worse when she wakes up and many movements she makes at work aggravate the pain.    Re-eval (2/28/25):  Pt reports that she has made about 50% improvement since starting PT for back and LE pain. Reports that B hips are feeling good now. Reports that low back, L knee, and R foot/ankle are primary areas to work on. Reports that pain is always worse after a day of work. Reports that she has been performing press up exercise, which helps with pain. Reports that she also has neck pain that affects her throughout the day; reports that pain starts at the midneck and travels down to lower back.    Patient Goals  Patient goals for therapy: increased strength, decreased pain, increased motion and return to sport/leisure activities          Objective     AROM R L   Lumbar flexion WNL   Lumbar extension WNL   Lumbar side bend WNL WNL   Lumbar rotation WNL WNL     AROM R L   Hip flexion 120 120   Hip extension     Hip abduction     Hip adduction     Hip ER 50 50   Hip IR 45 45   Knee flexion WFL WFL   Knee extension 2 deg hyper 2 deg hyper   Ankle DF WFL WFL   Ankle PF WFL WFL   Ankle inversion     Ankle eversion       MMT R L   Hip flexion 5 4+   Hip extension     Hip abduction     Hip adduction     Hip ER     Hip IR     Knee flexion 5 5   Knee extension 5 5   Ankle DF 5 5   Ankle PF     Ankle inversion     Ankle eversion       Slump test: (-) bilaterally    Straight leg raise: (-) B    5x STS: 10 sec    AROM R L   Cervical spine flexion WNL (pain in neck and shoulder)   Cervical spine extension WNL   Cervical spine rotation WNL WNL   Cervical spine side bend WNL (pain in shoulder blades) WNL   Cervical spine protraction WNL   Cervical spine retraction WNL (pain in shoulder blades)      AROM R L   Shoulder flexion WNL* WNL   Shoulder extension     Shoulder abduction WNL WNL   Shoulder adduction     Shoulder ER Functional reach WNL Functional reach WNL   Shoulder IR  Functional reach limited 25% * Functional reach WNL   Elbow flexion WNL WNL   Elbow extension WNL WNL   Supination     Pronation     Wrist flexion     Wrist extension     Wrist radial deviation     Wrist ulnar deviation        MMT R L   Shoulder flexion 4+ 4+   Shoulder extension     Shoulder abduction 5 5   Shoulder adduction     Shoulder ER 5 5   Shoulder IR 5 5   Elbow flexion 5 5   Elbow extension 5 5   Supination     Pronation     Wrist flexion 5 5   Wrist extension 5 5   Wrist radial deviation     Wrist ulnar deviation       C2-T2 myotomes WNL    Reflexes R L   C5-C6 (biceps) 2+ 2+   C5-C6 (brachioradialis) 2+ 2+   C6-C7 (triceps) 2+ 2+   Inverted supinator (-) (-)   Fabrice's (-) (+)   Clonus (-) (-)      Repeated motions exam - cervical spine:  - Retraction: increase, no worse  - Retraction+extension: increase, no worse  - R side bend: increase, worse  - L side bend: increase, better?       Precautions: none    [email or web address]  [email or web address]: [email or web address]  Sciatic nerve glide  Seated hip IR stretch    Manuals 3/14 3/28   1/17 1/31 2/14 2/28   Lumbar CPA       L1-L5 grade IV                                     Neuro Re-Ed           Sciatic nerve glide      2x10 B     PPT           PPT + 3 breaths           PPT + march Paloff press     Tband (blue) Standing 2x15 Tband (blue) seated 2x15 B     PPT+clamshell 2x20          PPT+SLR X15 ea  2# x10 ea          SL hip abd 2x10 ea          Edu  Re: objective findings, cervical spine neuroanatomy, pathophysiolog         Ther Ex           Seated hip IR stretch           Seated figure 4 stretch           LTR R 2x15 (no change)    [id number] B X10 B     Knee to chest stretch           Open book        2x10 L   Prone on elbows           Prone lying           Standing repeated lumbar ext           Prone press up 3x10 (no change)      2x10  2x10 w/ pt OP X10 w/ pt OP (knee pain decrease 4/10 to 2/10   Double knees to chest 3x15 (no change)           Seated thoracic extension w/ lumbar roll       2x10 2x10   Thoracic extension on foam roll       2x5     Seated thoracic flexion        2x10 (stop)   Standing hip ext     Bent forward on table 2x10      Glute bridge     2x10      Seated lumbar ext w/ Taras roll     2x10      Lumbar L side glide on wall     2x10      Edu  Re: POC, HEP, examination findings    Re: objective findings  Re: objective findings, HEP, POC   Ther Activity           Total gym squat           Leg press           Gait Training                                 Modalities           Moist heat

## 2025-03-28 NOTE — ASSESSMENT & PLAN NOTE
Cont meloxicam and start robaxin and pt to have dedicated PT for this   Orders:  •  XR spine cervical complete 4 or 5 vw non injury; Future  •  methocarbamol (ROBAXIN) 500 mg tablet; Take 1 tablet (500 mg total) by mouth 3 (three) times a day  •  Ambulatory Referral to Physical Therapy; Future  •  meloxicam (MOBIC) 15 mg tablet; Take 1 tablet (15 mg total) by mouth daily as needed for moderate pain

## 2025-03-28 NOTE — PROGRESS NOTES
"Name: Elis Patterson      : 1985      MRN: [id number]  Encounter Provider: Korin Caballero MD  Encounter Date: 3/28/2025   Encounter department: [facility] MEDICINE  :  Assessment & Plan  Spasm of both trapezius muscles  Cont meloxicam and start robaxin and pt to have dedicated PT for this   Orders:  •  XR spine cervical complete 4 or 5 vw non injury; Future  •  methocarbamol (ROBAXIN) 500 mg tablet; Take 1 tablet (500 mg total) by mouth 3 (three) times a day  •  Ambulatory Referral to Physical Therapy; Future  •  meloxicam (MOBIC) 15 mg tablet; Take 1 tablet (15 mg total) by mouth daily as needed for moderate pain           History of Present Illness   Pt with neck pain right side worse than right extending down right arm   a little numbness below elbow going through PT did have some neck exercises  pt has had this symptom for a few weeks no injury  uses tylenol no help    Neck Pain   Associated symptoms include headaches.   Shoulder Pain     Headache    Review of Systems   Musculoskeletal:  Positive for neck pain (right worse than left  to right shoulder).   Neurological:  Positive for headaches.       Objective   /72 (BP Location: Left arm, Patient Position: Sitting, Cuff Size: Standard)   Pulse 95   Temp 97.8 °F (36.6 °C) (Tympanic)   Resp 16   Ht 5' 1\" (1.549 m)   Wt 71.7 kg (158 lb)   LMP 2023   SpO2 99%   BMI 29.85 kg/m²      Physical Exam  Musculoskeletal:         General: Tenderness (trapezius muscle  tenderness right more than left) present.         "

## 2025-03-31 ENCOUNTER — HOSPITAL ENCOUNTER (OUTPATIENT)
Dept: RADIOLOGY | Facility: HOSPITAL | Age: 40
Discharge: HOME | End: 2025-03-31
Payer: COMMERCIAL

## 2025-03-31 ENCOUNTER — OFFICE VISIT (OUTPATIENT)
Dept: PODIATRY | Facility: CLINIC | Age: 40
End: 2025-03-31

## 2025-03-31 ENCOUNTER — OFFICE VISIT (OUTPATIENT)
Dept: PHYSICAL THERAPY | Facility: REHABILITATION | Age: 40
End: 2025-03-31
Payer: COMMERCIAL

## 2025-03-31 ENCOUNTER — HOSPITAL ENCOUNTER (OUTPATIENT)
Dept: RADIOLOGY | Facility: HOSPITAL | Age: 40
Discharge: HOME | End: 2025-03-31
Attending: PODIATRIST
Payer: COMMERCIAL

## 2025-03-31 ENCOUNTER — RESULTS FOLLOW-UP (OUTPATIENT)
Dept: FAMILY MEDICINE CLINIC | Facility: CLINIC | Age: 40
End: 2025-03-31

## 2025-03-31 VITALS — HEART RATE: 84 BPM | OXYGEN SATURATION: 99 % | BODY MASS INDEX: 30.02 KG/M2 | WEIGHT: 159 LBS | HEIGHT: 61 IN

## 2025-03-31 DIAGNOSIS — M76.62 ACHILLES TENDINITIS OF BOTH LOWER EXTREMITIES: ICD-10-CM

## 2025-03-31 DIAGNOSIS — M79.672 PAIN IN BOTH FEET: ICD-10-CM

## 2025-03-31 DIAGNOSIS — M72.2 PLANTAR FASCIITIS: Primary | ICD-10-CM

## 2025-03-31 DIAGNOSIS — M76.61 ACHILLES TENDINITIS OF BOTH LOWER EXTREMITIES: ICD-10-CM

## 2025-03-31 DIAGNOSIS — R10.2 PELVIC PAIN: Primary | ICD-10-CM

## 2025-03-31 DIAGNOSIS — M79.671 PAIN IN BOTH FEET: ICD-10-CM

## 2025-03-31 DIAGNOSIS — M62.830 SPASM OF BOTH TRAPEZIUS MUSCLES: ICD-10-CM

## 2025-03-31 PROCEDURE — 97112 NEUROMUSCULAR REEDUCATION: CPT | Performed by: PHYSICAL THERAPIST

## 2025-03-31 PROCEDURE — 97530 THERAPEUTIC ACTIVITIES: CPT | Performed by: PHYSICAL THERAPIST

## 2025-03-31 PROCEDURE — 73630 X-RAY EXAM OF FOOT: CPT

## 2025-03-31 PROCEDURE — 72050 X-RAY EXAM NECK SPINE 4/5VWS: CPT

## 2025-03-31 PROCEDURE — 97140 MANUAL THERAPY 1/> REGIONS: CPT | Performed by: PHYSICAL THERAPIST

## 2025-03-31 RX ORDER — TRIAMCINOLONE ACETONIDE 40 MG/ML
20 INJECTION, SUSPENSION INTRA-ARTICULAR; INTRAMUSCULAR
Status: SHIPPED | OUTPATIENT
Start: 2025-03-31

## 2025-03-31 RX ORDER — BUPIVACAINE HYDROCHLORIDE 7.5 MG/ML
2 INJECTION, SOLUTION EPIDURAL; RETROBULBAR
Status: SHIPPED | OUTPATIENT
Start: 2025-03-31

## 2025-03-31 RX ADMIN — BUPIVACAINE HYDROCHLORIDE 2 ML: 7.5 INJECTION, SOLUTION EPIDURAL; RETROBULBAR at 09:00

## 2025-03-31 RX ADMIN — TRIAMCINOLONE ACETONIDE 20 MG: 40 INJECTION, SUSPENSION INTRA-ARTICULAR; INTRAMUSCULAR at 09:00

## 2025-03-31 NOTE — PROGRESS NOTES
Foot/lower extremity injection    Performed by: Yony Fitzpatrick DPM  Authorized by: Yony Fitzpatrick DPM    Procedure:     Other Assisting Provider: No      Verbal consent obtained?: Yes      Risks and benefits: Risks, benefits and alternatives were discussed      Consent given by:  Patient    Time out: Immediately prior to the procedure a time out was called      Time out performed at:  3/31/2025 9:15 AM    Patient states understanding of procedure being performed: Yes      Patient identity confirmed:  Verbally with patient and provided demographic data    Supporting Documentation:     Indications:  Pain    Procedure Details:    Prep: patient was prepped and draped in usual sterile fashion                Ethyl Chloride was applied      Needle size: 25 G G    Ultrasound Guidance: no      Approach:  Medial    Laterality:  Right    Cyst Aspiration/Injection: No      Location: aponeurosis      Aponeurosis Structures: Plantar fascia origin      Injection Information:       Medications:  2 mL bupivacaine (PF) 0.75 %; 20 mg triamcinolone acetonide 40 mg/mL    Patient tolerance:  Patient tolerated the procedure well with no immediate complications

## 2025-03-31 NOTE — PROGRESS NOTES
Podiatry Clinic Visit  Elis Patterson 39 y.o. female MRN: [id number]  Encounter: [id number]    Assessment & Plan        Diagnoses and all orders for this visit:    Pain in both feet  -     XR foot 3+ vw left; Future  -     XR foot 3+ vw right; Future           Plan:  Patient was examined, evaluated, and treated with all questions and concerns addressed.  X-ray of bilateral feet evaluated. Plantar heel spur noted with no signs of fracture or erosion.  Patient was informed that she has bilateral plantar fasciitis and Achilles tendon insertional tendinitis. Stretching exercises are recommended. Because patient is already in PT, a prescription of formal PT workup of both the plantar fasciitis and Achilles tendon insertional tendinitis exercises were ordered.  Patient was offered a plantar fascia injection for the right foot pain.  Patient was encouraged to use some good OTC orthosis and supportive sneakers. Specific brands were recommended.  Patient was re-appointed for 3-4 weeks    - Dr. Fitzpatrick was available/present for entirety of patient encounter and present for all procedures.          History of Present Illness     HPI: Elis Patterson is a 39 y.o. female who presents with complaint of bilateral heel pain. She started to have the pain to both plantar heels and posterior heels a few months ago with no eliciting incidents and has been working with PT and use cushioning insoles which did not help much. She is having up to 7/10 pain to the right foot and less to the left foot. Post static pain and end of the day pain. Patient works standing/walking through the day. The patient has no further podiatric complaints at this time.    Review of Systems   Constitutional: Negative.    HENT: Negative.    Eyes: Negative.    Respiratory: Negative.    Cardiovascular: Negative.    Gastrointestinal: Negative.    Musculoskeletal: bilateral heel pain  Skin: negative  Neurological: Negative.        Historical Information   Past  "Medical History:   Diagnosis Date    Adenomyosis     Anxiety     Asthma     Depression     Difficulty walking     Dysmenorrhea     Fibroid     GERD (gastroesophageal reflux disease)     Migraine      Past Surgical History:   Procedure Laterality Date    CYSTOSCOPY N/A 06/28/2024    Procedure: CYSTOSCOPY;  Surgeon: Brett Everett MD;  Location:  MAIN OR;  Service: Gynecology    HYSTERECTOMY  06/28/24    MA LAPS TOTAL HYSTERECT 250 GM/< W/RMVL TUBE/OVARY N/A 06/28/2024    Procedure: EXAM UNDER ANESTHESIA; HYSTERECTOMY LAPAROSCOPIC TOTAL (LTH) W/ ROBOTICS AND BILATERAL SALPINGECTOMY.;  Surgeon: Brett Everett MD;  Location:  MAIN OR;  Service: Gynecology     Social History   Social History     Substance and Sexual Activity   Alcohol Use Not Currently    Comment: rare     Social History     Substance and Sexual Activity   Drug Use Never     Social History     Tobacco Use   Smoking Status Every Day    Current packs/day: 0.25    Average packs/day: 0.3 packs/day for 17.2 years (4.3 ttl pk-yrs)    Types: Cigarettes    Start date: 2008   Smokeless Tobacco Never     Family History:   Family History   Problem Relation Age of Onset    Heart disease Mother     Hyperlipidemia Mother     Hypertension Mother     Diabetes Mother     Asthma Mother     Depression Mother     Anxiety disorder Mother     No Known Problems Father     Cancer Other         Pt states dads side has cancer unsure what type of cancer.       Meds/Allergies   Not in a hospital admission.  No Known Allergies    Objective     Current Vitals:   Pulse: 84 (03/31/25 0833)  Height: 5' 1\" (154.9 cm) (03/31/25 0833)  Weight - Scale: 72.1 kg (159 lb) (03/31/25 0833)  SpO2: 99 % (03/31/25 0833)        Pulse 84   Ht 5' 1\" (1.549 m)   Wt 72.1 kg (159 lb)   LMP 09/26/2023   SpO2 99%   BMI 30.04 kg/m²       Lower Extremity Exam:    Foot Exam    Musculoskeletal:  MMT is 5/5 to all compartments of the LE B/L, +0/4 edema B/L,  Pain on  palpation of bilateral medial " tubercle and Achilles tendon insertion, Equinus is present. pain with passive ROM of bilateral 1st MPJ     Vascular:   DP pulses are present bilaterally and PT pulses are present bilaterally., CFT< 3sec to all digits. Pedal hair is Absent. Skin temperature warm to warm B/L.     Dermatological:  No open Lesions. Skin of the LE is normal texture, temperature, turgor B/L. Interdigital maceration is not present. All nails are normal. Xerotic skin is noted of the dorsum and plantar surface of the feet bilaterally.        Neurologic:  Gross sensation is Intact. Monofilament sensation is Intact. Sharp sensation is present.                 Biomechanical Exam of LE:  Hip ROM WNL Bilateral, external and internal rotation about equal at 45° b/l  Knee ROM WNL Bilateral, no hyperextension noted b/l, no genu varum/valgum noted b/l  Ankle dorsiflexion with knee extended is limited and unable to get pass vertical on right and limited and unable to get pass vertical on left  Ankle dorsiflexion with knee flexed is limited and unable to get pass vertical on right and limited and unable to get pass vertical on left  Malleolar positioning is WNL b/l  STJ ROM WNL b/l, heel inversion is approximately 20° on right and 20° on left; heel eversion is approximately 10° on right and 10° on left; neutral calcaneal stance position is 0°   1st ray ROM WNL b/l, unable to dorsiflex/plantarflex b/l because of guarding of heel pain  Tibial varum is 0° b/l, Resting calcaneal stance position is valgus and approximately 5° on right and valgus and approximately 3° on left  Gait analysis: pronated and guarding   Gross deformity noted: pes cavus

## 2025-03-31 NOTE — PROGRESS NOTES
Daily Note     Today's date: 3/31/2025  Patient name: Elis Patterson  : 1985  MRN: [id number]  Referring provider: Brett Everett MD  Dx:   Encounter Diagnosis     ICD-10-CM    1. Pelvic pain  R10.2           Start Time: 1145  Stop Time: 1240  Total time in clinic (min): 55 minutes    Subjective: The patient notes that she had 2-3 days of a flare up since her last visit. She feels like it does coincide with what would have been her cycle. She notes some low back and pelvic pain during her flare ups. She did not have a bladder instillation last week, but is scheduled for one tomorrow.       Objective: See treatment diary below      Assessment: Tolerated treatment well. Patient  does demonstrate some tension in her right and pelvic floor muscles, although improved. She does note some soreness on the left side. She felt like her bladder was full  and emptied immediately following manuals. She reported a little discomfort while emptying. Worked on some pelvic floor muscle downtraining exercises afterwards as she holds tension in her hips and her feet. She is scheduled to return next on . She is on cancellation list for sooner appointment it it becomes available.       Plan: Continue per plan of care.    Precautions: anxiety/depression; hysterectomy 2024; asthma  [employer] HEP: [id number]   Treatment Goals:         IE RE   [place]-18     PFDI-20     V-Q 39.39    PGQ         Date 1/9 1/16 1/23 1/30 2/20 2/27 3/6 3/13 3/20 3/31                                          Manuals             Scar mobilization             STM mobilization             Abdominal Tri Planar Myofascial release    10 min 10 min 10 min 10 min  10 min 10 min   Pelvic Floor Muscle Releases  10 min  10 min 10 min 10 min 10 min 10 min 10 min 10 min                             Patient Education             Neuro Re-Ed             Biofeedback   20 min          TA ADIM             PFMC slow holds      5x       Diaphragmatic Breathing  10 min  "Done  done 10 min with PFM relaxation 5 min with PFM relaxation    5 min   DKC with Diaphragmatic Breathing  15 sec x 3 ea hold         30 sec x 2   Child's Pose with DiaphragmaticBreathing  15 sec x 3 ea hold          Body Scan for PFM release/relaxatoin  8 min           Pelvic Floor Drops in deep squat             PFM contractions  10x supine/hooklying 10 x supine    5x sitting 10x supine         DB seated on pball   2 min    3 min  3 min    Pelvic circles on pball for PFM relaxation   10x cw/ccw 10x cw/ccw 10x cw/ccw 15x cw/ccw 20x cw/ccw  20x cw/ccw 20x cw/ccw                             Ther Ex             PPT             LTR             Cat Cow             UL Butterfly Stretch             Butterfly stretch             Pball pelvic tilts             Pball pelvic circles             Pball hip add stretch       10\"x4 ea  10\"x5 ea 10\"x5 ea                                                       Ther Activity             EDUCATION/COUNSELING 20 min 10 min 15 min 15 min 15 min  10 min 40 min 10 min 10 min   Dilator Training             Gait Training                                       Modalities                                                                            "

## 2025-03-31 NOTE — PATIENT INSTRUCTIONS
Recommend quality over the counter arch supports:    - Powerstep [place] series insoles  - Spenco Orthotic Arch insoles  - Superfeet insoles   - PCSsole- 3/4 length insoles  
No indicators present

## 2025-04-01 ENCOUNTER — PROCEDURE VISIT (OUTPATIENT)
Dept: OBGYN CLINIC | Facility: CLINIC | Age: 40
End: 2025-04-01

## 2025-04-01 VITALS
SYSTOLIC BLOOD PRESSURE: 118 MMHG | DIASTOLIC BLOOD PRESSURE: 64 MMHG | BODY MASS INDEX: 30.13 KG/M2 | WEIGHT: 159.6 LBS | HEIGHT: 61 IN

## 2025-04-01 DIAGNOSIS — N30.10 INTERSTITIAL CYSTITIS: Primary | ICD-10-CM

## 2025-04-01 NOTE — PROGRESS NOTES
"Assessment/Plan:     Diagnoses and all orders for this visit:    Interstitial cystitis       39-year-old female  Lower pelvic pain/urgency frequency/IC symptom respond to bladder instillation  Diverticulosis  Smoker  Had robotic TLH BS secondary to adenomyosis  History of trichomoniasis  Plan  To follow-up with GI  To continue bladder diet/Kegel  Bladder instillation done today  Return to office in 2 weeks for bladder instillation    Subjective:      Patient ID: Elis Patterson is a 39 y.o. female.    HPI  Patient seen evaluated presented to the office today  For bladder instillation secondary to interstitial cystitis  Patient said her symptom improved significantly with bladder instillation and desire to continue  Procedure explained and  Discussed with patient all patient questions answered patient was satisfied    The following portions of the patient's history were reviewed and updated as appropriate: allergies, current medications, past family history, past medical history, past social history, past surgical history and problem list.    Review of Systems      Objective:      /64 (BP Location: Left arm, Patient Position: Sitting, Cuff Size: Adult)   Ht 5' 1\" (1.549 m)   Wt 72.4 kg (159 lb 9.6 oz)   LMP 09/26/2023   BMI 30.16 kg/m²          Physical Exam    Universal Protocol:  Consent: Verbal consent obtained. Written consent obtained.  Risks and benefits: risks, benefits and alternatives were discussed  Consent given by: patient  Time out: Immediately prior to procedure a \"time out\" was called to verify the correct patient, procedure, equipment, support staff and site/side marked as required.  Patient understanding: patient states understanding of the procedure being performed  Patient consent: the patient's understanding of the procedure matches consent given  Procedure consent: procedure consent matches procedure scheduled  Patient identity confirmed: verbally with patient    Bladder " catheterization    Date/Time: 4/1/2025 4:45 PM    Performed by: Brett Everett MD  Authorized by: Brett Everett MD    Consent:     Consent given by:  Patient  Universal protocol:     Procedure explained and questions answered to patient or proxy's satisfaction: yes      Immediately prior to procedure a time out was called: yes      Patient identity confirmed:  Verbally with patient  Pre-procedure details:     Procedure purpose:  Therapeutic    Preparation: Patient was prepped and draped in usual sterile fashion    Anesthesia (see MAR for exact dosages):     Anesthesia method:  Topical application    Topical anesthetic:  Lidocaine gel  Procedure details:     Catheter insertion:  Non-indwelling    Catheter type:  Non-latex    Catheter size:  8 Fr    Number of attempts:  1    Successful placement: yes      Urine characteristics:  Clear    Provider performed due to:  Nurse unavailable  Post-procedure details:     Patient tolerance of procedure:  Tolerated well, no immediate complications  Comments:      Bladder catheterization was performed following instillation was done using 40,000 international unit of heparin mixed with 10 mL of sterile water mixed with 5 mL of bicarb followed by 3 cc of lidocaine gel patient tolerated procedure well

## 2025-04-11 ENCOUNTER — OFFICE VISIT (OUTPATIENT)
Dept: PHYSICAL THERAPY | Facility: CLINIC | Age: 40
End: 2025-04-11
Payer: COMMERCIAL

## 2025-04-11 DIAGNOSIS — M79.671 RIGHT FOOT PAIN: ICD-10-CM

## 2025-04-11 DIAGNOSIS — G89.29 CHRONIC PAIN OF LEFT KNEE: ICD-10-CM

## 2025-04-11 DIAGNOSIS — M54.2 NECK PAIN: ICD-10-CM

## 2025-04-11 DIAGNOSIS — M25.551 BILATERAL HIP PAIN: ICD-10-CM

## 2025-04-11 DIAGNOSIS — G89.29 CHRONIC BILATERAL LOW BACK PAIN WITH BILATERAL SCIATICA: Primary | ICD-10-CM

## 2025-04-11 DIAGNOSIS — M25.552 BILATERAL HIP PAIN: ICD-10-CM

## 2025-04-11 DIAGNOSIS — M25.562 CHRONIC PAIN OF LEFT KNEE: ICD-10-CM

## 2025-04-11 DIAGNOSIS — M54.42 CHRONIC BILATERAL LOW BACK PAIN WITH BILATERAL SCIATICA: Primary | ICD-10-CM

## 2025-04-11 DIAGNOSIS — M54.41 CHRONIC BILATERAL LOW BACK PAIN WITH BILATERAL SCIATICA: Primary | ICD-10-CM

## 2025-04-11 PROCEDURE — 97110 THERAPEUTIC EXERCISES: CPT

## 2025-04-11 NOTE — PROGRESS NOTES
Daily Note     Today's date: 2025  Patient name: Elis Patterson  : 1985  MRN: [id number]  Referring provider: Korin Caballero MD  Dx:   Encounter Diagnosis     ICD-10-CM    1. Chronic bilateral low back pain with bilateral sciatica  M54.42     M54.41     G89.29       2. Bilateral hip pain  M25.551     M25.552       3. Chronic pain of left knee  M25.562     G89.29       4. Right foot pain  M79.671       5. Neck pain  M54.2             Start Time: 731  Stop Time: 758  Total time in clinic (min): 27 minutes    Subjective: Pt reports 4/10 low back pain, no hip, knee, or foot/ankle pain. Reports that hips have still been feeling good, but knee and foot/ankle have been remaining the same.      Objective: See treatment diary below      Assessment: L ankle pain was reproduced with repeated extension in prone; this was abolished with lumbar L side glide (pain centralized, low back worse). Due to this response, I believe there is contribution to foot/ankle pain from the lumbar spine. Instructed pt to perform this for HEP, as long as distal symptoms reduce. No change in neck pain was achieved; further evaluation is warranted. Plan to send a referral to [facility].      Plan: Continue per plan of care.      Precautions: none    [email or web address]  Access Code: [email or web address]  Sciatic nerve glide  Seated hip IR stretch    Manuals 3/14 3/28 4/11  1/17 1/31 2/14 2/28   Lumbar CPA       L1-L5 grade IV                                     Neuro Re-Ed           Sciatic nerve glide      2x10 B     PPT           PPT + 3 breaths           PPT +  press     Tband (blue) Standing 2x15 Tband (blue) seated 2x15 B     PPT+clamshell 2x20          PPT+SLR X15 ea  2# x10 ea          SL hip abd 2x10 ea          Edu  Re: objective findings, cervical spine neuroanatomy, pathophysiolog         Ther Ex           Seated hip IR stretch           Seated figure 4 stretch           LTR R 2x15  (no change)    [id number] B X10 B     Knee to chest stretch           Open book        2x10 L   Prone on elbows           Prone lying           Standing repeated lumbar ext           Prone press up 3x10 (no change)  2x10 (worse L ankle)    2x10  2x10 w/ pt OP X10 w/ pt OP (knee pain decrease 4/10 to 2/10   Double knees to chest 3x15 (no change)          Seated thoracic extension w/ lumbar roll   X10 (worse)    2x10 2x10   Thoracic extension on foam roll       2x5     Seated thoracic flexion   X10 (worse)     2x10 (stop)   Standing hip ext     Bent forward on table 2x10      Glute bridge     2x10      Seated lumbar ext w/ Taras roll     2x10      Lumbar L side glide on wall     2x10      Lumbar R side glide on wall   2x10 (L ankle better)        Heel raise   X20 (no change)        Cervical spine retraction   2x10 (no change)  X5 supine (worse)        Cervical spine protraction   2x10 (no change)        Edu  Re: POC, HEP, examination findings    Re: objective findings  Re: objective findings, HEP, POC   Ther Activity           Total gym squat           Leg press           Gait Training                                 Modalities           Moist heat

## 2025-04-14 ENCOUNTER — PROCEDURE VISIT (OUTPATIENT)
Dept: OBGYN CLINIC | Facility: CLINIC | Age: 40
End: 2025-04-14

## 2025-04-14 VITALS
BODY MASS INDEX: 29.34 KG/M2 | WEIGHT: 155.4 LBS | DIASTOLIC BLOOD PRESSURE: 82 MMHG | HEIGHT: 61 IN | SYSTOLIC BLOOD PRESSURE: 112 MMHG

## 2025-04-14 DIAGNOSIS — N30.10 INTERSTITIAL CYSTITIS: Primary | ICD-10-CM

## 2025-04-14 NOTE — PROGRESS NOTES
"Assessment/Plan:     Diagnoses and all orders for this visit:    Interstitial cystitis        39-year-old female  Lower pelvic pain/urgency frequency/IC symptom respond to bladder instillation  Diverticulosis  Smoker  Had robotic TLH BS secondary to adenomyosis  History of trichomoniasis  Plan  To follow-up with GI  To continue bladder diet/Kegel  Bladder instillation done today  Return to office in 2 weeks for bladder instillation    Subjective:      Patient ID: Elis Patterson is a 39 y.o. female.    HPI  Patient seen evaluated presented to the office today for bladder instillation secondary to interstitial cystitis  Procedure explained and discussed with patient patient noted significant improvement in her symptoms desired procedure to continue symptoms still controlled with spacing of the procedure to every 2 weeks    The following portions of the patient's history were reviewed and updated as appropriate: allergies, current medications, past family history, past medical history, past social history, past surgical history and problem list.    Review of Systems      Objective:      /82 (BP Location: Left arm, Patient Position: Sitting, Cuff Size: Adult)   Ht 5' 1\" (1.549 m)   Wt 70.5 kg (155 lb 6.4 oz)   LMP 09/26/2023   BMI 29.36 kg/m²          Physical Exam    Universal Protocol:  Consent: Verbal consent obtained.  Risks and benefits: risks, benefits and alternatives were discussed  Consent given by: patient  Time out: Immediately prior to procedure a \"time out\" was called to verify the correct patient, procedure, equipment, support staff and site/side marked as required.  Patient understanding: patient states understanding of the procedure being performed  Patient consent: the patient's understanding of the procedure matches consent given  Procedure consent: procedure consent matches procedure scheduled  Relevant documents: relevant documents present and verified  Patient identity confirmed: " verbally with patient    Bladder catheterization    Date/Time: 4/14/2025 1:15 PM    Performed by: Brett Everett MD  Authorized by: Brett Everett MD    Consent:     Consent given by:  Patient  Universal protocol:     Procedure explained and questions answered to patient or proxy's satisfaction: yes      Relevant documents present and verified: yes      Immediately prior to procedure a time out was called: yes      Patient identity confirmed:  Verbally with patient  Pre-procedure details:     Procedure purpose:  Therapeutic    Preparation: Patient was prepped and draped in usual sterile fashion    Anesthesia (see MAR for exact dosages):     Anesthesia method:  Topical application    Topical anesthetic:  Lidocaine gel  Procedure details:     Bladder irrigation: no      Catheter insertion:  Non-indwelling    Catheter type:  Non-latex    Catheter size:  8 Fr    Successful placement: yes      Urine characteristics:  Dark    Provider performed due to:  Nurse unavailable  Post-procedure details:     Patient tolerance of procedure:  Tolerated well, no immediate complications  Comments:      Bladder catheterization was performed following the bladder instillation was done using 40,000 international unit of heparin mixed with 10 mL of sterile water mixed with 5 mL of bicarb followed by 3 cc of lidocaine gel patient tolerated procedure well patient instructed to hold her urine for 3 hours

## 2025-04-21 ENCOUNTER — OFFICE VISIT (OUTPATIENT)
Dept: PODIATRY | Facility: CLINIC | Age: 40
End: 2025-04-21
Payer: COMMERCIAL

## 2025-04-21 ENCOUNTER — OFFICE VISIT (OUTPATIENT)
Dept: PHYSICAL THERAPY | Facility: REHABILITATION | Age: 40
End: 2025-04-21
Attending: OBSTETRICS & GYNECOLOGY
Payer: COMMERCIAL

## 2025-04-21 VITALS — HEIGHT: 61 IN | BODY MASS INDEX: 29.64 KG/M2 | WEIGHT: 157 LBS | HEART RATE: 83 BPM | OXYGEN SATURATION: 97 %

## 2025-04-21 DIAGNOSIS — M79.671 PAIN OF RIGHT HEEL: ICD-10-CM

## 2025-04-21 DIAGNOSIS — M72.2 PLANTAR FASCIITIS, BILATERAL: Primary | ICD-10-CM

## 2025-04-21 DIAGNOSIS — M54.2 NECK PAIN: Primary | ICD-10-CM

## 2025-04-21 DIAGNOSIS — R10.2 PELVIC PAIN: ICD-10-CM

## 2025-04-21 PROCEDURE — 97140 MANUAL THERAPY 1/> REGIONS: CPT | Performed by: PHYSICAL THERAPIST

## 2025-04-21 PROCEDURE — 99213 OFFICE O/P EST LOW 20 MIN: CPT | Performed by: PODIATRIST

## 2025-04-21 PROCEDURE — 20550 NJX 1 TENDON SHEATH/LIGAMENT: CPT | Performed by: PODIATRIST

## 2025-04-21 PROCEDURE — 97110 THERAPEUTIC EXERCISES: CPT | Performed by: PHYSICAL THERAPIST

## 2025-04-21 RX ADMIN — BUPIVACAINE HYDROCHLORIDE 2 ML: 2.5 INJECTION, SOLUTION INFILTRATION; PERINEURAL at 10:00

## 2025-04-21 RX ADMIN — METHYLPREDNISOLONE ACETATE 0.5 ML: 40 INJECTION, SUSPENSION INTRA-ARTICULAR; INTRALESIONAL; INTRAMUSCULAR; SOFT TISSUE at 10:00

## 2025-04-21 NOTE — PROGRESS NOTES
:  Assessment & Plan  Plantar fasciitis, bilateral    Orders:    Foot/lower extremity injection    Pain of right heel    Orders:    Foot/lower extremity injection    The patient's clinical examination today is significant for mild to moderate tenderness palpation to the plantar medial tubercle of the right os calcis.  There is no tenderness with lateral squeeze of the right calcaneus.  There is no erythema nor edema no calor or ecchymosis.  Pain is slightly improved compared to her prior visit.  Pedal pulses palpable.  There are no open lesions.    Treatment options were discussed with the patient today.  She would like to proceed with repeat injection therapy.  After prepping the skin with alcohol, CSI was administered to the plantar medial tubercle of the right os calcis via a plantar approach today.  The injection was well-tolerated.  Ethyl chloride was utilized for topical anesthesia.    Continue supportive shoe gear and home stretching exercises.  Recommend follow-up in 4 weeks.      History of Present Illness     Elis Patterson is a 39 y.o. female   The patient presents today for follow-up of bilateral heel pain, right worse than the left.  Symptoms on the left are relatively mild.  She does notice some interval improvement since her last visit after injection therapy.  She still notes tenderness when getting up after periods of rest.  Although it is not quite as intense.      PAST MEDICAL HISTORY:  Past Medical History:   Diagnosis Date    Adenomyosis     Anxiety     Asthma     Depression     Difficulty walking     Dysmenorrhea     Fibroid     GERD (gastroesophageal reflux disease)     Migraine        PAST SURGICAL HISTORY:  Past Surgical History:   Procedure Laterality Date    CYSTOSCOPY N/A 06/28/2024    Procedure: CYSTOSCOPY;  Surgeon: Brett Everett MD;  Location:  MAIN OR;  Service: Gynecology    HYSTERECTOMY  06/28/24    GA LAPS TOTAL HYSTERECT 250 GM/< W/RMVL TUBE/OVARY N/A 06/28/2024     Procedure: EXAM UNDER ANESTHESIA; HYSTERECTOMY LAPAROSCOPIC TOTAL (LTH) W/ ROBOTICS AND BILATERAL SALPINGECTOMY.;  Surgeon: Brett Everett MD;  Location:  MAIN OR;  Service: Gynecology        ALLERGIES:  Patient has no known allergies.    MEDICATIONS:  Current Outpatient Medications   Medication Sig Dispense Refill    acetaminophen (TYLENOL) 500 mg tablet Take 500 mg by mouth every 6 (six) hours as needed for mild pain      budesonide-formoterol (Symbicort) 160-4.5 mcg/act inhaler Inhale 2 puffs 2 (two) times a day Rinse mouth after use. 10.2 g 1    buPROPion (WELLBUTRIN XL) 150 mg 24 hr tablet TAKE 1 TABLET BY MOUTH EVERY DAY IN THE MORNING 90 tablet 1    escitalopram (LEXAPRO) 10 mg tablet TAKE 1 TABLET BY MOUTH EVERY DAY 90 tablet 1    esomeprazole (NexIUM) 40 MG capsule TAKE 1 CAPSULE (40 MG TOTAL) BY MOUTH EVERY MORNING. 90 capsule 1    Heparin Sodium, Porcine, (heparin, porcine,) 83366 UNIT/ML To bring to the office for administration by provider 2 mL 3    lidocaine (XYLOCAINE) 2 % topical gel Apply topically as needed for mild pain 5 mL 3    meloxicam (MOBIC) 15 mg tablet Take 1 tablet (15 mg total) by mouth daily as needed for moderate pain 30 tablet 3    methocarbamol (ROBAXIN) 500 mg tablet Take 1 tablet (500 mg total) by mouth 3 (three) times a day 30 tablet 1    ondansetron (ZOFRAN-ODT) 4 mg disintegrating tablet Take 1 tablet (4 mg total) by mouth every 8 (eight) hours as needed for nausea or vomiting 20 tablet 1     Current Facility-Administered Medications   Medication Dose Route Frequency Provider Last Rate Last Admin    bupivacaine (PF) (MARCAINE) 0.75 % injection 2 mL  2 mL Infiltration     2 mL at 03/31/25 0900    triamcinolone acetonide (Kenalog-40) 40 mg/mL injection 20 mg  20 mg Infiltration     20 mg at 03/31/25 0900       SOCIAL HISTORY:  Social History     Socioeconomic History    Marital status: Single     Spouse name: None    Number of children: None    Years of education: None     "Highest education level: None   Occupational History    None   Tobacco Use    Smoking status: Every Day     Current packs/day: 0.25     Average packs/day: 0.3 packs/day for 17.3 years (4.3 ttl pk-yrs)     Types: Cigarettes     Start date: 2008    Smokeless tobacco: Never   Vaping Use    Vaping status: Never Used   Substance and Sexual Activity    Alcohol use: Not Currently     Comment: rare    Drug use: Never    Sexual activity: Not Currently     Partners: Male     Birth control/protection: Abstinence, Condom Male   Other Topics Concern    None   Social History Narrative    ** Merged History Encounter **          Social Drivers of Health     Financial Resource Strain: Not on file   Food Insecurity: Not on file   Transportation Needs: Not on file   Physical Activity: Not on file   Stress: Not on file   Social Connections: Not on file   Intimate Partner Violence: Not on file   Housing Stability: Not on file      Review of Systems   Constitutional: Negative.    Respiratory:  Negative for shortness of breath.    Cardiovascular:  Negative for leg swelling.   Psychiatric/Behavioral: Negative.       Objective   Pulse 83   Ht 5' 1\" (1.549 m)   Wt 71.2 kg (157 lb)   LMP 09/26/2023   SpO2 97%   BMI 29.66 kg/m²      Physical Exam  Vitals and nursing note reviewed.   Constitutional:       General: She is not in acute distress.     Appearance: She is well-developed.   HENT:      Head: Normocephalic and atraumatic.   Cardiovascular:      Pulses:           Dorsalis pedis pulses are 2+ on the right side and 2+ on the left side.        Posterior tibial pulses are 2+ on the right side and 2+ on the left side.   Pulmonary:      Effort: Pulmonary effort is normal.   Musculoskeletal:        Feet:    Feet:      Right foot:      Skin integrity: Skin integrity normal.      Left foot:      Skin integrity: Skin integrity normal.      Comments: The patient's clinical examination today is significant for mild to moderate tenderness " palpation to the plantar medial tubercle of the right os calcis.  There is no tenderness with lateral squeeze of the right calcaneus.  There is no erythema nor edema no calor or ecchymosis.  Pain is slightly improved compared to her prior visit.  Pedal pulses palpable.  There are no open lesions.  Skin:     General: Skin is warm and dry.      Capillary Refill: Capillary refill takes less than 2 seconds.   Neurological:      General: No focal deficit present.      Mental Status: She is alert and oriented to person, place, and time.   Psychiatric:         Mood and Affect: Mood normal.           Foot/lower extremity injection    Performed by: Yony Fitzpatrick DPM  Authorized by: Yony Fitzpatrick DPM    Procedure:     Other Assisting Provider: No      Verbal consent obtained?: No      Risks and benefits: Risks, benefits and alternatives were discussed      Consent given by:  Patient    Time out: Immediately prior to the procedure a time out was called      Time out performed at:  4/21/2025 9:50 AM    Patient states understanding of procedure being performed: Yes      Patient identity confirmed:  Verbally with patient and provided demographic data    Supporting Documentation:     Indications:  Pain    Procedure Details:    Prep: patient was prepped and draped in usual sterile fashion                Ethyl Chloride was applied      Needle size: 25 G G    Ultrasound Guidance: no      Approach:  Plantar    Laterality:  Right    Cyst Aspiration/Injection: No      Location: aponeurosis      Aponeurosis Structures: Plantar fascia origin      Injection Information:       Medications:  2 mL bupivacaine 0.25 %; 0.5 mL methylPREDNISolone acetate 40 mg/mL    Patient tolerance:  Patient tolerated the procedure well with no immediate complications

## 2025-04-21 NOTE — PROGRESS NOTES
Daily Note     Today's date: 2025  Patient name: Elis Patterson  : 1985  MRN: [id number]  Referring provider: Brett Everett MD  Dx:   Encounter Diagnosis     ICD-10-CM    1. Neck pain  M54.2       2. Pelvic pain  R10.2           Start Time: 1453  Stop Time: 1530  Total time in clinic (min): 37 minutes    Subjective: The patient continues to go for bladder instillations. She is not going once every two weeks. She is also going to see pain management for her lower back and neck. She has 3-4 smaller flare ups since she was last seen in 3/21 in PT. She notes that her flare ups are not lasting as long.      Objective: See treatment diary below      Assessment: Tolerated treatment well. Patient  demonstrates some tension in the right pelvic floor today, but notes some soreness and pressure on the left. She did have some difficulty with the exercises today because she received an injection in her right foot this morning and it was sore. She will return on  for her next visit. Re-evaluate at that time.      Plan: Continue per plan of care.      Precautions: anxiety/depression; hysterectomy 2024; asthma  [employer] HEP: [id number]   Treatment Goals:         IE RE   [place]-18     PFDI-20     V-Q 39.39    PGQ         Date 4/21 1/16 1/23 1/30 2/20 2/27 3/6 3/13 3/20 3/31                                          Manuals             Scar mobilization             STM mobilization             Abdominal Tri Planar Myofascial release 12 min   10 min 10 min 10 min 10 min  10 min 10 min   Pelvic Floor Muscle Releases 10 min 10 min  10 min 10 min 10 min 10 min 10 min 10 min 10 min                             Patient Education             Neuro Re-Ed             Biofeedback   20 min          TA ADIM             PFMC slow holds      5x       Diaphragmatic Breathing  10 min Done  done 10 min with PFM relaxation 5 min with PFM relaxation    5 min   DKC with Diaphragmatic Breathing  15 sec x 3 ea hold         30 sec x 2  "  Child's Pose with DiaphragmaticBreathing  15 sec x 3 ea hold          Body Scan for PFM release/relaxatoin  8 min           Pelvic Floor Drops in deep squat             PFM contractions  10x supine/hooklying 10 x supine    5x sitting 10x supine         DB seated on pball   2 min    3 min  3 min    Pelvic circles on pball for PFM relaxation 20x ccw/cw  10x cw/ccw 10x cw/ccw 10x cw/ccw 15x cw/ccw 20x cw/ccw  20x cw/ccw 20x cw/ccw                             Ther Ex             PPT             LTR             Cat Cow             UL Butterfly Stretch             Butterfly stretch             Pball pelvic tilts             Pball pelvic circles             Pball hip add stretch 10\"x5 ea      10\"x4 ea  10\"x5 ea 10\"x5 ea                                                       Ther Activity             EDUCATION/COUNSELING 20 min 10 min 15 min 15 min 15 min  10 min 40 min 10 min 10 min   Dilator Training             Gait Training                                       Modalities                                                                            "

## 2025-04-25 ENCOUNTER — OFFICE VISIT (OUTPATIENT)
Dept: PHYSICAL THERAPY | Facility: CLINIC | Age: 40
End: 2025-04-25
Payer: COMMERCIAL

## 2025-04-25 DIAGNOSIS — M79.671 RIGHT FOOT PAIN: ICD-10-CM

## 2025-04-25 DIAGNOSIS — G89.29 CHRONIC PAIN OF LEFT KNEE: ICD-10-CM

## 2025-04-25 DIAGNOSIS — M25.552 BILATERAL HIP PAIN: ICD-10-CM

## 2025-04-25 DIAGNOSIS — M54.41 CHRONIC BILATERAL LOW BACK PAIN WITH BILATERAL SCIATICA: ICD-10-CM

## 2025-04-25 DIAGNOSIS — M25.562 CHRONIC PAIN OF LEFT KNEE: ICD-10-CM

## 2025-04-25 DIAGNOSIS — G89.29 CHRONIC BILATERAL LOW BACK PAIN WITH BILATERAL SCIATICA: ICD-10-CM

## 2025-04-25 DIAGNOSIS — M25.551 BILATERAL HIP PAIN: ICD-10-CM

## 2025-04-25 DIAGNOSIS — M54.42 CHRONIC BILATERAL LOW BACK PAIN WITH BILATERAL SCIATICA: ICD-10-CM

## 2025-04-25 DIAGNOSIS — M54.2 NECK PAIN: Primary | ICD-10-CM

## 2025-04-25 PROCEDURE — 97110 THERAPEUTIC EXERCISES: CPT

## 2025-04-25 RX ORDER — METHYLPREDNISOLONE ACETATE 40 MG/ML
0.5 INJECTION, SUSPENSION INTRA-ARTICULAR; INTRALESIONAL; INTRAMUSCULAR; SOFT TISSUE
Status: SHIPPED | OUTPATIENT
Start: 2025-04-21

## 2025-04-25 RX ORDER — BUPIVACAINE HYDROCHLORIDE 2.5 MG/ML
2 INJECTION, SOLUTION INFILTRATION; PERINEURAL
Status: SHIPPED | OUTPATIENT
Start: 2025-04-21

## 2025-04-25 NOTE — PROGRESS NOTES
Daily Note     Today's date: 2025  Patient name: Elis Patterson  : 1985  MRN: [id number]  Referring provider: Korin Caballero MD  Dx:   Encounter Diagnosis     ICD-10-CM    1. Neck pain  M54.2       2. Chronic bilateral low back pain with bilateral sciatica  M54.42     M54.41     G89.29       3. Bilateral hip pain  M25.551     M25.552       4. Chronic pain of left knee  M25.562     G89.29       5. Right foot pain  M79.671           Start Time: 730  Stop Time: 075  Total time in clinic (min): 28 minutes    Subjective: Pt reports that she got an injection to her L foot on Monday; the pain has decreased since Monday. She reports that her heel feels a little bit better, but the rest of her foot still hurts. She reports that her back pain averages 5/10, but gets up to 7-8/10 at end of day. She reports that neck and headache pain have been bothering her and she has been getting vertigo more this week. She notices the vertigo when she turns over in bed, occasionally randomly. Reports that her knees are feeling good.      Objective: See treatment diary below      Assessment: Manuals to neck worsened neck pain, headache, and dizziness; unable to provide relief with gentle mobilization and distraction. As such, PT treatment of neck will be put on hold until evaluation from Sp[facility] and Pain Management. Focused on exercises for heel and foot after; pain did not worsen with these exercises. Encouraged continued performance of heel raises at home for tendon remodeling in heels.All treatments tolerated well, no adverse response.       Plan: Continue per plan of care.      Precautions: none    [email or web address]  Access [email or web address]: [email or web address]  Sciatic nerve glide  Seated hip IR stretch    Manuals 3/14 3/28 4/11 4/25  1/31 2/14 2/28   Lumbar CPA       L1-L5 grade IV    Cervical spine    Grade III- UPA C3-C6 w/ rotation B                             Neuro Re-Ed           Sciatic nerve glide      2x10 B     PPT            PPT + 3 breaths           PPT + march           Paloff press      Tband (blue) seated 2x15 B     PPT+clamshell 2x20          PPT+SLR X15 ea  2# x10 ea          SL hip abd 2x10 ea          Edu  Re: objective findings, cervical spine neuroanatomy, pathophysiolog         Ther Ex           Seated hip IR stretch           Seated figure 4 stretch           LTR R 2x15 (no change)     X10 B     Knee to chest stretch           Open book        2x10 L   Prone on elbows           Prone lying           Standing repeated lumbar ext           Prone press up 3x10 (no change)  2x10 (worse L ankle)    2x10  2x10 w/ pt OP X10 w/ pt OP (knee pain decrease 4/10 to 2/10   Double knees to chest 3x15 (no change)          Seated thoracic extension w/ lumbar roll   X10 (worse)    2x10 2x10   Thoracic extension on foam roll       2x5     Seated thoracic flexion   X10 (worse)     2x10 (stop)   Standing hip ext           Glute bridge           Seated lumbar ext w/ Taras roll           Lumbar L side glide on wall           Lumbar R side glide on wall   2x10 (L ankle better)        Calf stretch    Slant board [id number]       Heel raise   X20 (no change) 3x15 B       Cervical spine retraction   2x10 (no change)  X5 supine (worse) X10       Cervical spine flexion    X10       Cervical spine protraction   2x10 (no change)        Edu  Re: POC, HEP, examination findings  Re: POC, HEP  Re: objective findings  Re: objective findings, HEP, POC   Ther Activity           Total gym squat           Leg press           Gait Training                                 Modalities           Moist heat

## 2025-04-28 ENCOUNTER — PROCEDURE VISIT (OUTPATIENT)
Dept: OBGYN CLINIC | Facility: CLINIC | Age: 40
End: 2025-04-28

## 2025-04-28 VITALS
DIASTOLIC BLOOD PRESSURE: 62 MMHG | SYSTOLIC BLOOD PRESSURE: 108 MMHG | HEIGHT: 61 IN | WEIGHT: 156.2 LBS | BODY MASS INDEX: 29.49 KG/M2

## 2025-04-28 DIAGNOSIS — N30.10 INTERSTITIAL CYSTITIS: Primary | ICD-10-CM

## 2025-04-28 NOTE — PROGRESS NOTES
"Assessment/Plan:     There are no diagnoses linked to this encounter.           39-year-old female  Lower pelvic pain/urgency frequency/IC symptom respond to bladder instillation  Diverticulosis  Smoker  Had robotic TLH BS secondary to adenomyosis  History of trichomoniasis  Plan  To follow-up with GI  To continue bladder diet/Kegel  Bladder instillation done today  Return to office in 2 weeks for bladder instillation  Subjective:      Patient ID: Elis Patterson is a 39 y.o. female.    HPI  Patient seen evaluated presented office today for bladder instillation secondary to interstitial cystitis patient is responding well with bladder instillation desired to continue  Procedures plan discussed with patient all patient questions answered patient was satisfied  Denies any urgency frequency or dysuria denies any pelvic pain  The following portions of the patient's history were reviewed and updated as appropriate: allergies, current medications, past family history, past medical history, past social history, past surgical history and problem list.    Review of Systems      Objective:      /62 (BP Location: Left arm, Patient Position: Sitting, Cuff Size: Adult)   Ht 5' 1\" (1.549 m)   Wt 70.9 kg (156 lb 3.2 oz)   LMP 09/26/2023   BMI 29.51 kg/m²          Physical Exam    Universal Protocol:  Consent: Verbal consent obtained.  Risks and benefits: risks, benefits and alternatives were discussed  Consent given by: patient  Time out: Immediately prior to procedure a \"time out\" was called to verify the correct patient, procedure, equipment, support staff and site/side marked as required.  Patient understanding: patient states understanding of the procedure being performed  Patient consent: the patient's understanding of the procedure matches consent given  Procedure consent: procedure consent matches procedure scheduled  Patient identity confirmed: verbally with patient    Bladder catheterization    Date/Time: " 4/28/2025 1:15 PM    Performed by: Brett Everett MD  Authorized by: Brett Everett MD    Consent:     Consent given by:  Patient  Universal protocol:     Procedure explained and questions answered to patient or proxy's satisfaction: yes      Immediately prior to procedure a time out was called: yes      Patient identity confirmed:  Verbally with patient  Pre-procedure details:     Procedure purpose:  Therapeutic    Preparation: Patient was prepped and draped in usual sterile fashion    Anesthesia (see MAR for exact dosages):     Anesthesia method:  Topical application    Topical anesthetic:  Lidocaine gel  Procedure details:     Bladder irrigation: no      Catheter type:  Non-latex    Catheter size:  8 Fr    Number of attempts:  1    Successful placement: yes      Urine characteristics:  Clear    Provider performed due to:  Nurse unavailable  Post-procedure details:     Patient tolerance of procedure:  Tolerated well, no immediate complications  Comments:      Bladder catheterization performed following the bladder instillation was done using 40,000 international units of heparin mixed with 10 mL of sterile water mixed with 5 mL of bicarb followed by 3 cc of lidocaine patient tolerated procedure well

## 2025-05-05 ENCOUNTER — APPOINTMENT (OUTPATIENT)
Dept: PHYSICAL THERAPY | Facility: REHABILITATION | Age: 40
End: 2025-05-05
Payer: COMMERCIAL

## 2025-05-08 ENCOUNTER — CONSULT (OUTPATIENT)
Dept: PAIN MEDICINE | Facility: CLINIC | Age: 40
End: 2025-05-08
Attending: FAMILY MEDICINE
Payer: COMMERCIAL

## 2025-05-08 VITALS
SYSTOLIC BLOOD PRESSURE: 109 MMHG | HEIGHT: 61 IN | DIASTOLIC BLOOD PRESSURE: 73 MMHG | BODY MASS INDEX: 28.89 KG/M2 | HEART RATE: 86 BPM | WEIGHT: 153 LBS

## 2025-05-08 DIAGNOSIS — M54.2 CHRONIC NECK PAIN: Primary | ICD-10-CM

## 2025-05-08 DIAGNOSIS — M54.2 NECK PAIN: ICD-10-CM

## 2025-05-08 DIAGNOSIS — M54.41 CHRONIC BILATERAL LOW BACK PAIN WITH BILATERAL SCIATICA: ICD-10-CM

## 2025-05-08 DIAGNOSIS — G89.29 CHRONIC NECK PAIN: Primary | ICD-10-CM

## 2025-05-08 DIAGNOSIS — G89.29 CHRONIC BILATERAL LOW BACK PAIN WITH BILATERAL SCIATICA: ICD-10-CM

## 2025-05-08 DIAGNOSIS — M79.18 MYOFASCIAL PAIN SYNDROME: ICD-10-CM

## 2025-05-08 DIAGNOSIS — M54.42 CHRONIC BILATERAL LOW BACK PAIN WITH BILATERAL SCIATICA: ICD-10-CM

## 2025-05-08 PROCEDURE — 99244 OFF/OP CNSLTJ NEW/EST MOD 40: CPT | Performed by: PHYSICAL MEDICINE & REHABILITATION

## 2025-05-08 NOTE — PROGRESS NOTES
Assessment  1. Chronic neck pain    2. Neck pain    3. Chronic bilateral low back pain with bilateral sciatica    4. Myofascial pain syndrome        Plan  Patient is a 39-year-old female sent in as referral from [facility] for evaluation of chronic neck pain and chronic low back pain. Patient reports chronic axial neck pain without radicular symptoms over the past years without any inciting event. She states symptoms worsen when rotating her head to the right and radiates up the occipital area and to bilateral trapezius areas. She has been going to PT for her chronic neck and chronic low back pain which is somewhat helping her symptoms. Currently taking Meloxicam 15 mg QD and Robaxin 500 mg QHS. She had an XR cervical spine done in  and interpreted by me showing mild facet arthropathy in cervical spine, XR lumbar spine done in  and interpreted by me showing mild facet arthropathy at L4-L5 and L5-S1. At this time, I suspect that patient's symptoms are secondary to cervical facet arthritis. Differential includes myofascial pain syndrome. At this time, patient would like to focus on her neck pain at this time and defer management of her low back.       Given that clinical presentation of cervical facet arthritis, I would like to proceed forward with performing Right C3-C5 MBB #1. If successful, will proceed forward with MBB # 2 and then RFA.. Risks vs benefits discussed in detail with the patient. These risks include, but are not limited to, bleeding, infection, nerve damage, paralysis. Patient is on/not on anticoagulant therapy. Patient denies contrast allergy. All patient’s questions were answered. Patient understands risks and is willing to pursue the procedure. The approach was demonstrated using models and literature was provided. Verbal consent obtained.  If symptom's do not improve, will consider performing TPI of bilateral trapezius areas.  Advised patient to titrate Robaxin to 500 mg BID for  muscle spasm     My impressions and treatment recommendations were discussed in detail with the patient who verbalized understanding and had no further questions.  Discharge instructions were provided. I personally saw and examined the patient and I agree with the above discussed plan of care.    Orders Placed This Encounter   Procedures    FL spine and pain procedure     Standing Status:   Future     Expected Date:   5/8/2025     Expiration Date:   5/8/2029     Reason for Exam::   Right C3-C5 MBB # 1     Is the patient pregnant?:   No     Anticoagulant hold needed?:   no     No orders of the defined types were placed in this encounter.      History of Present Illness    Elis Patterson is a 39 y.o. female sent in as referral from [facility] for chronic neck and chronic low back pain. Patient reports chronic axial neck pain without radicular symptoms over the past years without any inciting event. She states symptoms worsen when rotating her head to the right and radiates up the occipital area and to bilateral trapezius areas. She has been going to PT for her chronic neck and chronic low back pain which is somewhat helping her symptoms. Currently taking Meloxicam 15 mg QD and Robaxin 500 mg QHS.  She states that intensity is severe, states pain occurs constantly, described as a pressure and throbbing sensation, worsens with exercise.     I have personally reviewed and/or updated the patient's past medical history, past surgical history, family history, social history, current medications, allergies, and vital signs today.     Review of Systems   Constitutional:  Negative for fever and unexpected weight change.   HENT:  Negative for trouble swallowing.    Eyes:  Negative for visual disturbance.   Respiratory:  Negative for shortness of breath and wheezing.    Cardiovascular:  Negative for chest pain and palpitations.   Gastrointestinal:  Negative for constipation, diarrhea, nausea and vomiting.   Endocrine:  Negative for cold intolerance, heat intolerance and polydipsia.   Genitourinary:  Negative for difficulty urinating and frequency.   Musculoskeletal:  Positive for back pain and neck pain. Negative for arthralgias, gait problem, joint swelling and myalgias.   Skin:  Negative for rash.   Neurological:  Positive for dizziness. Negative for seizures, syncope, weakness and headaches.   Hematological:  Does not bruise/bleed easily.   Psychiatric/Behavioral:  Negative for dysphoric mood.    All other systems reviewed and are negative.  General: no fevers, chills, infections  Neuro: no saddle anesthesia, no dropping objects or balance issues  GI: no changes in bowel habits  : no changes in bladder habits  Hem: no bleeding      Patient Active Problem List   Diagnosis    Annual physical exam    Numbness and tingling in both hands    Gastroesophageal reflux disease without esophagitis    Moderate persistent asthma without complication    Cigarette nicotine dependence    Dysmenorrhea    Bilateral carpal tunnel syndrome    Irritant contact dermatitis    Class 1 obesity due to excess calories without serious comorbidity with body mass index (BMI) of 30.0 to 30.9 in adult    S/P robotic assisted laparoscopic hysterectomy    Major depressive disorder with single episode, in full remission (HCC)    Chronic pain of both knees    Left elbow pain    Chronic low back pain with bilateral sciatica    Bilateral hip pain    Pain in both feet    Foot pain, bilateral    Spasm of both trapezius muscles       Past Medical History:   Diagnosis Date    Adenomyosis     Anxiety     Asthma     Depression     Difficulty walking     Dysmenorrhea     Fibroid     GERD (gastroesophageal reflux disease)     Migraine        Past Surgical History:   Procedure Laterality Date    CYSTOSCOPY N/A 06/28/2024    Procedure: CYSTOSCOPY;  Surgeon: Brett Everett MD;  Location:  MAIN OR;  Service: Gynecology    HYSTERECTOMY  06/28/24    WA LAPS TOTAL HYSTERECT  250 GM/< W/RMVL TUBE/OVARY N/A 06/28/2024    Procedure: EXAM UNDER ANESTHESIA; HYSTERECTOMY LAPAROSCOPIC TOTAL (LTH) W/ ROBOTICS AND BILATERAL SALPINGECTOMY.;  Surgeon: Brett Everett MD;  Location:  MAIN OR;  Service: Gynecology       Family History   Problem Relation Age of Onset    Heart disease Mother     Hyperlipidemia Mother     Hypertension Mother     Diabetes Mother     Asthma Mother     Depression Mother     Anxiety disorder Mother     No Known Problems Father     Cancer Other         Pt states dads side has cancer unsure what type of cancer.       Social History     Occupational History    Not on file   Tobacco Use    Smoking status: Every Day     Current packs/day: 0.25     Average packs/day: 0.3 packs/day for 17.3 years (4.3 ttl pk-yrs)     Types: Cigarettes     Start date: 2008    Smokeless tobacco: Never   Vaping Use    Vaping status: Never Used   Substance and Sexual Activity    Alcohol use: Not Currently     Comment: rare    Drug use: Never    Sexual activity: Not Currently     Partners: Male     Birth control/protection: Abstinence, Condom Male       Current Outpatient Medications on File Prior to Visit   Medication Sig    acetaminophen (TYLENOL) 500 mg tablet Take 500 mg by mouth every 6 (six) hours as needed for mild pain    budesonide-formoterol (Symbicort) 160-4.5 mcg/act inhaler Inhale 2 puffs 2 (two) times a day Rinse mouth after use.    buPROPion (WELLBUTRIN XL) 150 mg 24 hr tablet TAKE 1 TABLET BY MOUTH EVERY DAY IN THE MORNING    escitalopram (LEXAPRO) 10 mg tablet TAKE 1 TABLET BY MOUTH EVERY DAY    esomeprazole (NexIUM) 40 MG capsule TAKE 1 CAPSULE (40 MG TOTAL) BY MOUTH EVERY MORNING.    Heparin Sodium, Porcine, (heparin, porcine,) 09620 UNIT/ML To bring to the office for administration by provider    lidocaine (XYLOCAINE) 2 % topical gel Apply topically as needed for mild pain    meloxicam (MOBIC) 15 mg tablet Take 1 tablet (15 mg total) by mouth daily as needed for moderate pain     "methocarbamol (ROBAXIN) 500 mg tablet Take 1 tablet (500 mg total) by mouth 3 (three) times a day    ondansetron (ZOFRAN-ODT) 4 mg disintegrating tablet Take 1 tablet (4 mg total) by mouth every 8 (eight) hours as needed for nausea or vomiting     Current Facility-Administered Medications on File Prior to Visit   Medication    bupivacaine (MARCAINE) 0.25 % injection 2 mL    bupivacaine (PF) (MARCAINE) 0.75 % injection 2 mL    methylPREDNISolone acetate (DEPO-MEDROL) injection 0.5 mL    triamcinolone acetonide (Kenalog-40) 40 mg/mL injection 20 mg       No Known Allergies    Physical Exam    /73 (BP Location: Right arm, Patient Position: Sitting, Cuff Size: Standard)   Pulse 86   Ht 5' 1\" (1.549 m)   Wt 69.4 kg (153 lb)   LMP 09/26/2023   BMI 28.91 kg/m²     Vitals:    05/08/25 0932   BP: 109/73   Pulse: 86     Constitutional: no apparent distress, does not appear sedated   HEENT: pupils equal and round, symmetric facial muscles   Neck: supple  Pulmonary: good chest wall excursion, breathing unlabored   Psych: appropriate affect and insight. no evidence of aberrant behavior   Skin: No apparent rashes or lesions  Neuro: cranial nerves II-XII grossly intact   MSK:  Inspection: no signs of infection to neck or back  Palpation: tenderness to palpation to bilateral upper cervical facets  ROM: decreased rotation of neck to right  MMT 5/5 strength in B/L UE  Sensation to light touch intact B/L UE   DTR: 2+ in B/L biceps, triceps  Special test: + cervical facet loading   Gait: ambulates unassisted, gait is not antalgic        Imaging  "

## 2025-05-12 ENCOUNTER — OFFICE VISIT (OUTPATIENT)
Dept: PODIATRY | Facility: CLINIC | Age: 40
End: 2025-05-12
Payer: COMMERCIAL

## 2025-05-12 ENCOUNTER — PROCEDURE VISIT (OUTPATIENT)
Dept: OBGYN CLINIC | Facility: CLINIC | Age: 40
End: 2025-05-12
Payer: COMMERCIAL

## 2025-05-12 ENCOUNTER — OFFICE VISIT (OUTPATIENT)
Dept: PHYSICAL THERAPY | Facility: REHABILITATION | Age: 40
End: 2025-05-12
Payer: COMMERCIAL

## 2025-05-12 ENCOUNTER — TELEPHONE (OUTPATIENT)
Dept: PAIN MEDICINE | Facility: CLINIC | Age: 40
End: 2025-05-12

## 2025-05-12 VITALS — BODY MASS INDEX: 28.89 KG/M2 | HEART RATE: 78 BPM | HEIGHT: 61 IN | OXYGEN SATURATION: 98 % | WEIGHT: 153 LBS

## 2025-05-12 VITALS
DIASTOLIC BLOOD PRESSURE: 64 MMHG | WEIGHT: 153 LBS | HEIGHT: 61 IN | BODY MASS INDEX: 28.89 KG/M2 | SYSTOLIC BLOOD PRESSURE: 122 MMHG

## 2025-05-12 DIAGNOSIS — R10.2 PELVIC PAIN: Primary | ICD-10-CM

## 2025-05-12 DIAGNOSIS — M72.2 PLANTAR FASCIITIS, BILATERAL: ICD-10-CM

## 2025-05-12 DIAGNOSIS — N30.10 INTERSTITIAL CYSTITIS: Primary | ICD-10-CM

## 2025-05-12 DIAGNOSIS — M72.2 PLANTAR FASCIITIS, LEFT: Primary | ICD-10-CM

## 2025-05-12 PROCEDURE — 20550 NJX 1 TENDON SHEATH/LIGAMENT: CPT | Performed by: PODIATRIST

## 2025-05-12 PROCEDURE — 51700 IRRIGATION OF BLADDER: CPT | Performed by: OBSTETRICS & GYNECOLOGY

## 2025-05-12 PROCEDURE — 97530 THERAPEUTIC ACTIVITIES: CPT | Performed by: PHYSICAL THERAPIST

## 2025-05-12 PROCEDURE — 97140 MANUAL THERAPY 1/> REGIONS: CPT | Performed by: PHYSICAL THERAPIST

## 2025-05-12 PROCEDURE — 97110 THERAPEUTIC EXERCISES: CPT | Performed by: PHYSICAL THERAPIST

## 2025-05-12 PROCEDURE — 99213 OFFICE O/P EST LOW 20 MIN: CPT | Performed by: PODIATRIST

## 2025-05-12 RX ORDER — BUPIVACAINE HYDROCHLORIDE 2.5 MG/ML
2 INJECTION, SOLUTION INFILTRATION; PERINEURAL
Status: SHIPPED | OUTPATIENT
Start: 2025-05-12

## 2025-05-12 RX ORDER — TRIAMCINOLONE ACETONIDE 40 MG/ML
20 INJECTION, SUSPENSION INTRA-ARTICULAR; INTRAMUSCULAR
Status: SHIPPED | OUTPATIENT
Start: 2025-05-12

## 2025-05-12 RX ADMIN — BUPIVACAINE HYDROCHLORIDE 2 ML: 2.5 INJECTION, SOLUTION INFILTRATION; PERINEURAL at 08:00

## 2025-05-12 RX ADMIN — TRIAMCINOLONE ACETONIDE 20 MG: 40 INJECTION, SUSPENSION INTRA-ARTICULAR; INTRAMUSCULAR at 08:00

## 2025-05-12 NOTE — PROGRESS NOTES
:  Assessment & Plan  Plantar fasciitis, left    Orders:    Foot/lower extremity injection  The patient's clinical examination today significant for moderate tenderness to palpation to the plantar medial tubercle of the left os calcis.  There is no erythema nor edema no calor or ecchymosis.  The right heel is much improved, the patient rates her pain to the right as a 1 out of 10 on the pain scale.  Pulses palpable bilaterally.    The patient was amenable to injection therapy on the left foot today as it worked well for her right.  After prepping the skin with alcohol, CSI was administered to the plantar medial tubercle via a medial approach.  The injection was well-tolerated.  Ethyl chloride used utilized for topical anesthesia.    Recommend follow-up in 3 to 4 weeks.  Plantar fasciitis, bilateral             History of Present Illness     Elis Patterson is a 39 y.o. female   The patient presents today for follow-up of bilateral heel pain secondary plantar fasciitis.  She has received 2 steroid injections of the right heel.  There is been good interval improvement.  She rates the pain to the right heel at about a 1 out of 10 on the pain scale.  She notes moderate tenderness to the left that persists.  She would like to proceed with injection therapy on her left heel today.      PAST MEDICAL HISTORY:  Past Medical History:   Diagnosis Date    Adenomyosis     Anxiety     Asthma     Depression     Difficulty walking     Dysmenorrhea     Fibroid     GERD (gastroesophageal reflux disease)     Migraine        PAST SURGICAL HISTORY:  Past Surgical History:   Procedure Laterality Date    CYSTOSCOPY N/A 06/28/2024    Procedure: CYSTOSCOPY;  Surgeon: Brett Everett MD;  Location:  MAIN OR;  Service: Gynecology    HYSTERECTOMY  06/28/24    NM LAPS TOTAL HYSTERECT 250 GM/< W/RMVL TUBE/OVARY N/A 06/28/2024    Procedure: EXAM UNDER ANESTHESIA; HYSTERECTOMY LAPAROSCOPIC TOTAL (LTH) W/ ROBOTICS AND BILATERAL SALPINGECTOMY.;   Surgeon: Brett Everett MD;  Location:  MAIN OR;  Service: Gynecology        ALLERGIES:  Patient has no known allergies.    MEDICATIONS:  Current Outpatient Medications   Medication Sig Dispense Refill    acetaminophen (TYLENOL) 500 mg tablet Take 500 mg by mouth every 6 (six) hours as needed for mild pain      budesonide-formoterol (Symbicort) 160-4.5 mcg/act inhaler Inhale 2 puffs 2 (two) times a day Rinse mouth after use. 10.2 g 1    buPROPion (WELLBUTRIN XL) 150 mg 24 hr tablet TAKE 1 TABLET BY MOUTH EVERY DAY IN THE MORNING 90 tablet 1    escitalopram (LEXAPRO) 10 mg tablet TAKE 1 TABLET BY MOUTH EVERY DAY 90 tablet 1    esomeprazole (NexIUM) 40 MG capsule TAKE 1 CAPSULE (40 MG TOTAL) BY MOUTH EVERY MORNING. 90 capsule 1    Heparin Sodium, Porcine, (heparin, porcine,) 70326 UNIT/ML To bring to the office for administration by provider 2 mL 3    lidocaine (XYLOCAINE) 2 % topical gel Apply topically as needed for mild pain 5 mL 3    meloxicam (MOBIC) 15 mg tablet Take 1 tablet (15 mg total) by mouth daily as needed for moderate pain 30 tablet 3    methocarbamol (ROBAXIN) 500 mg tablet Take 1 tablet (500 mg total) by mouth 3 (three) times a day 30 tablet 1    ondansetron (ZOFRAN-ODT) 4 mg disintegrating tablet Take 1 tablet (4 mg total) by mouth every 8 (eight) hours as needed for nausea or vomiting 20 tablet 1     Current Facility-Administered Medications   Medication Dose Route Frequency Provider Last Rate Last Admin    bupivacaine (MARCAINE) 0.25 % injection 2 mL  2 mL Infiltration     2 mL at 04/21/25 1000    bupivacaine (PF) (MARCAINE) 0.75 % injection 2 mL  2 mL Infiltration     2 mL at 03/31/25 0900    methylPREDNISolone acetate (DEPO-MEDROL) injection 0.5 mL  0.5 mL Infiltration     0.5 mL at 04/21/25 1000    triamcinolone acetonide (Kenalog-40) 40 mg/mL injection 20 mg  20 mg Infiltration     20 mg at 03/31/25 0900       SOCIAL HISTORY:  Social History     Socioeconomic History    Marital status: Single  "    Spouse name: None    Number of children: None    Years of education: None    Highest education level: None   Occupational History    None   Tobacco Use    Smoking status: Every Day     Current packs/day: 0.25     Average packs/day: 0.3 packs/day for 17.4 years (4.3 ttl pk-yrs)     Types: Cigarettes     Start date: 2008    Smokeless tobacco: Never   Vaping Use    Vaping status: Never Used   Substance and Sexual Activity    Alcohol use: Not Currently     Comment: rare    Drug use: Never    Sexual activity: Not Currently     Partners: Male     Birth control/protection: Abstinence, Condom Male   Other Topics Concern    None   Social History Narrative    ** Merged History Encounter **          Social Drivers of Health     Financial Resource Strain: Not on file   Food Insecurity: Not on file   Transportation Needs: Not on file   Physical Activity: Not on file   Stress: Not on file   Social Connections: Not on file   Intimate Partner Violence: Not on file   Housing Stability: Not on file      Review of Systems   Constitutional: Negative.    HENT: Negative.     Eyes: Negative.    Respiratory: Negative.     Cardiovascular: Negative.    Endocrine: Negative.    Musculoskeletal: Negative.    Neurological: Negative.    Hematological: Negative.    Psychiatric/Behavioral: Negative.       Objective   Pulse 78   Ht 5' 1\" (1.549 m)   Wt 69.4 kg (153 lb)   LMP 09/26/2023   SpO2 98%   BMI 28.91 kg/m²      Physical Exam  Vitals and nursing note reviewed.   Constitutional:       General: She is not in acute distress.     Appearance: She is well-developed.   HENT:      Head: Normocephalic and atraumatic.   Eyes:      Conjunctiva/sclera: Conjunctivae normal.   Cardiovascular:      Pulses:           Dorsalis pedis pulses are 2+ on the right side and 2+ on the left side.        Posterior tibial pulses are 2+ on the right side and 2+ on the left side.   Pulmonary:      Effort: Pulmonary effort is normal.   Abdominal:      Palpations: " Abdomen is soft.   Musculoskeletal:        Feet:    Feet:      Right foot:      Skin integrity: Skin integrity normal.      Left foot:      Skin integrity: Skin integrity normal.      Comments: The patient's clinical examination today significant for moderate tenderness to palpation to the plantar medial tubercle of the left os calcis.  There is no erythema nor edema no calor or ecchymosis.  The right heel is much improved, the patient rates her pain to the right as a 1 out of 10 on the pain scale.  Pulses palpable bilaterally.  Skin:     General: Skin is warm and dry.      Capillary Refill: Capillary refill takes less than 2 seconds.   Neurological:      General: No focal deficit present.      Mental Status: She is alert and oriented to person, place, and time.   Psychiatric:         Mood and Affect: Mood normal.         Foot/lower extremity injection    Performed by: Yony Fitzpatrick DPM  Authorized by: Yony Fitzpatrick DPM    Procedure:     Other Assisting Provider: No      Verbal consent obtained?: Yes      Risks and benefits: Risks, benefits and alternatives were discussed      Consent given by:  Patient    Time out: Immediately prior to the procedure a time out was called      Time out performed at:  5/12/2025 8:20 AM    Patient states understanding of procedure being performed: Yes      Patient identity confirmed:  Verbally with patient and provided demographic data    Supporting Documentation:     Indications:  Pain    Procedure Details:    Prep: patient was prepped and draped in usual sterile fashion                Ethyl Chloride was applied      Needle size: 25 G G    Ultrasound Guidance: no      Approach:  Medial    Laterality:  Left    Cyst Aspiration/Injection: No      Location: aponeurosis      Aponeurosis Structures: Plantar fascia origin      Injection Information:       Medications:  2 mL bupivacaine 0.25 %; 20 mg triamcinolone acetonide 40 mg/mL    Patient tolerance:  Patient tolerated the  procedure well with no immediate complications

## 2025-05-12 NOTE — PROGRESS NOTES
"Assessment/Plan:     Diagnoses and all orders for this visit:    Interstitial cystitis       39-year-old female  Lower pelvic pain/urgency frequency/IC symptom respond to bladder instillation  Diverticulosis  Smoker  Had robotic TLH BS secondary to adenomyosis  History of trichomoniasis  Plan  To follow-up with GI  To continue bladder diet/Kegel  Bladder instillation done today  Return to office in 2 weeks for bladder instillation    Subjective:      Patient ID: Elis Patterson is a 39 y.o. female.    HPI  Patient seen evaluated presented office today for bladder instillation secondary to interstitial cystitis that controlled with bladder instillation  Procedure explained and discussed with patient all patient questions answered and patient was satisfied      The following portions of the patient's history were reviewed and updated as appropriate: allergies, current medications, past family history, past medical history, past social history, past surgical history and problem list.    Review of Systems      Objective:      /64 (BP Location: Left arm, Patient Position: Sitting, Cuff Size: Adult)   Ht 5' 1\" (1.549 m)   Wt 69.4 kg (153 lb)   LMP 09/26/2023   BMI 28.91 kg/m²          Physical Exam        Universal Protocol:  Consent: Verbal consent obtained. Written consent obtained.  Risks and benefits: risks, benefits and alternatives were discussed  Consent given by: patient  Time out: Immediately prior to procedure a \"time out\" was called to verify the correct patient, procedure, equipment, support staff and site/side marked as required.  Patient understanding: patient states understanding of the procedure being performed  Patient consent: the patient's understanding of the procedure matches consent given  Procedure consent: procedure consent matches procedure scheduled  Patient identity confirmed: verbally with patient    Bladder catheterization    Date/Time: 5/12/2025 1:15 PM    Performed by: Brett" MD Karlos  Authorized by: Brett Everett MD    Consent:     Consent given by:  Patient  Universal protocol:     Procedure explained and questions answered to patient or proxy's satisfaction: yes      Immediately prior to procedure a time out was called: yes      Patient identity confirmed:  Verbally with patient  Pre-procedure details:     Procedure purpose:  Therapeutic    Preparation: Patient was prepped and draped in usual sterile fashion    Anesthesia (see MAR for exact dosages):     Anesthesia method:  Topical application    Topical anesthetic:  Lidocaine gel  Procedure details:     Bladder irrigation: no      Approach: natural orifice      Catheter type:  Non-latex    Catheter size:  8 Fr    Number of attempts:  1    Successful placement: yes      Urine characteristics:  Clear    Provider performed due to:  Nurse unavailable  Post-procedure details:     Patient tolerance of procedure:  Tolerated well, no immediate complications  Comments:      Bladder catheterization performed following that bladder instillation was done using 40,000 international units of heparin mixed with 10 mL of sterile water mixed with 5 mL of bicarb followed by 3 cc of lidocaine gel patient tolerated procedure well

## 2025-05-12 NOTE — PROGRESS NOTES
PT Re-Evaluation     Today's date: 2025  Patient name: Elis Patterson  : 1985  MRN: [id number]  Referring provider: Brett Everett MD  Dx:   Encounter Diagnosis     ICD-10-CM    1. Pelvic pain  R10.2           Start Time: 1545  Stop Time: 1630  Total time in clinic (min): 45 minutes    Assessment  Impairments: abnormal coordination, abnormal or restricted ROM, activity intolerance, impaired physical strength, lacks appropriate home exercise program, pain with function, poor posture  and poor body mechanics    Assessment details: The patient is a 39 y.o. female with complaints of pelvic pain for the past 6 months or so. Her history includes a total hysterectomy (ovary sparing) in 2024 due to adenomyosis and fibroids. Her menstrual history includes heavy periods. She has been treated for a total of 12 visits including today and her IE on . She presents with some continued tenderness throughout the pelvic floor R > L. This has improved in severity overall. She also has some improved pelvic floor muscle tone as well. She does have some continued tension and pelvic floor weakness as well. She has noticed some additional improvement with receiving bladder instillations as well. She would benefit from continuing pelvic floor physical therapy to help reduce/manage pain and symptoms, address impairments and maximize function and quality of life upon discharge. She will be given updated HEP throughout episode of care.     Therapeutic activities performed upon examination included education regarding pelvic floor anatomy, explanation of exam technique, explanation of exam findings and discussion of treatment plan as well as expectations of the patient to emphasize the importance of compliance and adherence to physical therapy visits.               Understanding of Dx/Px/POC: good    Goals  ST. The patient will reduce pelvic floor muscle tone by 25 to 50%  in 12 weeks. - met, 25%  2. The patient  will improve pelvic floor muscle strength by 1 grade with improved coordination in 12 weeks. - not met  3. The patient will demonstrate good posture with sitting on toilet to promote proper pelvic floor muscle lengthening.   4. The patient will demonstrate good isolation of respiratory diaphragm and good coordination of diaphragm and transverse abdominis in 8 to 10 weeks.     LT. The patient will be compliant with exercises and self care program to independently manage symptoms upon discharge.   2. The patient will minimize straining for evacuation of bowels upon discharge.   3. The patient will have minimal to no pelvic pain upon discharge.  4. The patient will demonstrate improved bladder voiding and emptying of her bowels upon discharge.       Plan  Patient would benefit from: skilled physical therapy  Planned modality interventions: biofeedback    Planned therapy interventions: activity modification, abdominal trunk stabilization, behavior modification, body mechanics training, breathing training, coordination, flexibility, functional ROM exercises, IADL retraining, manual therapy, neuromuscular re-education, patient education, postural training, self care, strengthening, therapeutic activities and therapeutic exercise    Frequency: 1x week  Duration in weeks: 8  Plan of Care beginning date: 2025  Plan of Care expiration date: 2025  Treatment plan discussed with: patient        PT Pelvic Floor Subjective:   History of Present Illness:   Update: : The patient did have a bladder instillation today. She is not receiving them every two weeks and currently has two more scheduled. She notes that her bladder has been feeling ok overall. She has not been having much bladder pain. She does still feel some bladder pressure right before she empties. She did have a little bladder leakage today when she coughed hard. This happens on occasion but not often. She notes some improvements in her overall pain  levels but still some urgency and incontinence. She has been doing her exercises at home.     Update: 3/13: The patient notes that she continues to experience pelvic pain, but it is less severe than previously. She does continue to have pain at work, she is on her feet a lot and is constantly bending and lifting. She notes that overall recovery from her hysterectomy has been ok. She notes her main issue has been pain and trying to figure out how to manage her pain. She has pain daily, intermittently, the intensity varies depending on activity.     Update 1/30/25: The patient went for her second bladder instillation this week. She notes less discomfort with the procedure for the second time. She does feel that she had a little relief after this second instillation. She may have had a little less bladder frequency afterwards. Otherwise, no change in symptoms since beginning PT on 1/9. She does continue to experience low back pain which she attends PT at a different clinic for symptoms related to that.       IE: 1/9/25  The patient notes that she is experiencing ongoing pelvic pain. She notes that after her hysterectomy, she started to notice pelvic pain. She describes the pain as vaginal and abdominal. She experiences pain if she has to urinate or empty her bowels. She does not wake up in the middle of the night, and when she wakes up in the morning her bladder is full and this causes her pain. She does have pain when she empties, but then relief 15 minutes after emptying. She describes the pain as achy and sharp. She saw Dr. Everett recently and she recommended pelvic floor therapy and bladder instillations. She feels that her symptoms are consistent with interstitial cystitis. She is scheduled later this month to follow up with Dr. Everett and receive her first bladder instillation.     She has also been attending PT for her lower back and legs at the [facility]. Mechanism of injury: surgery    Total  "Hysterectomy: 6/28/24 - almost 8 months post operative  Social Support:     Lives with: lives at home with mom, brother, and sister.    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)    History of Depression: yes  Diet and Exercise:    Diet:balanced nutrition  OB/ gyn History    Gestational History:     Prior Pregnancy: No      Menstrual History:      Birth control: no contraception  Can feel cramping and ovulation symptoms  Hysterectomy: 6/28/24: total hysterectomy   Periods were very irregular and extremely heavy - she notes that they were like this for a long time  She did try oral BC and Depo injections to help but this did not help at all with her bleeding  Bladder Function:     Voiding Difficulties positive for: hesitancy (takes time to start her stream; can't always go \"on demand\" unless she is really full) and incomplete emptying (sometimes)      Voiding Difficulties negative for: urgency, frequent urination and straining       Voiding Difficulties comments:     Voiding frequency: every 3-4 hours    Urinary leakage: urine leakage    Urinary leakage aggravated by: coughing    Nocturia (episodes per night): 0    Painful urination: Yes (pain and burning (more mild) when she is releasing her bladder, which has improved since instillations)      Fluid Intake Type:  Water    Intake (ounces):     Intake (ounces) comment: Water: 32 ounce water bottle 3x a day  1 iced coffee in the morning  Juice with lunch  Soda: A few cans a week, not every day    Does not drink as much water as she can at work because she can't - trying to increase water intake at work if able  Bowel Function:     Voiding DIfficulties: painful defecating, unfinished feeling after defecating and constipation      Bowel Function comments:  Strain to go and painful to pass  Depends on diet    Bowel frequency: daily    [place]o Stool Scale: type 3, type 4, type 5 and type 6    Stool softener " use: no stool softeners  Sexual Function:     Sexually Active:  Not sexually active (not currently sexually active since her surgery)    Pain during intercourse: Yes  Patient did experience pain with sex prior to her surgery:  Pain:     Current pain ratin    At best pain ratin    At worst pain ratin    Location:  Bladder: 3/10 on average; pelvic pain: 1/10 at best; /10    Onset:  7-12 months ago    Quality:  Dull ache, sharp and pressure    Aggravating factors:  [place], exercise, urination, prolonged positions and bowel movements (moving, bending, lifting, has pain with work functions; sitting > standing)    Pain duration: pain is intermittent; can last for a few hours then come back.    Relieving factors:  Medications, heat and ice    Progression:  Improved (not as frequently severe)      Objective       Abdominal Assessment:      Abdominal Assessment: Soft; no tenderness reported  Some pressure in suprapubic region  Tenderness R pubic bone and R LQ    Skin inspection:   scars present.   Number of scars: 4  Additional skin inspection details: Laparoscopic incisions - all well healed; no tenderness, redness etc; mobility WNL, sensitivity WNL      General Perineum Exam:   perineum intact.   Negative for swelling, descent, lesion and gaping introitus    General perineum exam comments: Pelvic floor verbal consent and written consent signed and in chart    Education provided today:   Time Spent on Patient Education: 15 min    Pelvic floor anatomy and function  Physiology/relationship of abdominal canister and pelvis/pelvic organs/pelvic floor muscles  Bowel and Bladder anatomy and function    Pelvic and chronic pain education  Surgical and post op considerations  PT exam and course of treatment      Future Education To be Provided:     Visual Inspection of Perineum:   Excursion of perineal body in cephalad direction with contraction of pelvic floor muscles (PFM): weak  Excursion of perineal body in  caudal direction with relaxation of pelvic floor muscles (PFM): unable  Involuntary contraction with coughing: no  Involuntary relaxation with bearing down: no  PFM Contraction Comments: Bony [place] palpation: tenderness over the pubic bone      Pelvic Clock (external muscle palpation): no pain or tenderness noted      [place] swab test: non-tender  Sensation: intact    Pelvic Organ Prolapse   no pelvic organ prolapse  Perineal body inspection: within normal limits        Pelvic Floor Muscle Exam:     Muscle Contraction: overflow, substitution and compensation with glutes and hip adductors   Breathing pattern with contraction: holding breath   Pelvic floor muscle relaxation is incomplete.   75% pelvic floor relaxation        PERFECT Score   Power right: 1/5   Power left: 1/5      Perfect Score: Pelvic floor muscle weakness noted - most likely due to tension today      pelvic floor exam consent given by patient    Pelvic exam completed: vaginally     SMEG Biofeedback   Sensor used: external sensors placed perianally  Positioning: supine    power protocol   Secs work/Secs rest/Reps: 2  Max achieved (microvolts): 7  Working average (microvolts): 5  Recruitment: moderate  Holding ability: poor  Derecruitment: fair  Biofeedback comments: Compensation with glutes, breath holding, and hip adductors.   Limited muscle recruitment and coordination noted    Graphical documentation:     Patient did experience pain with sex prior to her surgery           Precautions: anxiety/depression; hysterectomy June 2024; asthma  [employer] HEP: [id number]   Treatment Goals:         IE RE   [place]-18     PFDI-20     V-Q 39.39    PGQ         Date 4/21 5/12 1/23 1/30 2/20 2/27 3/6 3/13 3/20 3/31                                          Manuals             Scar mobilization             STM mobilization             Abdominal Tri Planar Myofascial release 12 min   10 min 10 min 10 min 10 min  10 min 10 min   Pelvic Floor Muscle Releases 10 min 10 min  " 10 min 10 min 10 min 10 min 10 min 10 min 10 min                             Patient Education             Neuro Re-Ed             Biofeedback   20 min          TA ADIM             PFMC slow holds      5x       Diaphragmatic Breathing  10 min Done  done 10 min with PFM relaxation 5 min with PFM relaxation    5 min   DKC with Diaphragmatic Breathing   hold         30 sec x 2   Child's Pose with DiaphragmaticBreathing   hold          Body Scan for PFM release/relaxatoin             Pelvic Floor Drops in deep squat             PFM contractions   10 x supine    5x sitting 10x supine         DB seated on pball   2 min    3 min  3 min    Pelvic circles on pball for PFM relaxation 20x ccw/cw  10x cw/ccw 10x cw/ccw 10x cw/ccw 15x cw/ccw 20x cw/ccw  20x cw/ccw 20x cw/ccw                             Ther Ex             PPT             LTR             Cat Cow             UL Butterfly Stretch             Butterfly stretch             Pball pelvic tilts             Pball pelvic circles             Pball hip add stretch 10\"x5 ea 10x5\" ea     10\"x4 ea  10\"x5 ea 10\"x5 ea                                                       Ther Activity             EDUCATION/COUNSELING 20 min 15 min 15 min 15 min 15 min  10 min 40 min 10 min 20 min   Dilator Training             Gait Training                                       Modalities                                                         "

## 2025-05-13 ENCOUNTER — OFFICE VISIT (OUTPATIENT)
Dept: FAMILY MEDICINE CLINIC | Facility: CLINIC | Age: 40
End: 2025-05-13
Payer: COMMERCIAL

## 2025-05-13 VITALS
TEMPERATURE: 97.9 F | WEIGHT: 155 LBS | SYSTOLIC BLOOD PRESSURE: 118 MMHG | BODY MASS INDEX: 29.27 KG/M2 | OXYGEN SATURATION: 98 % | RESPIRATION RATE: 16 BRPM | DIASTOLIC BLOOD PRESSURE: 72 MMHG | HEART RATE: 90 BPM | HEIGHT: 61 IN

## 2025-05-13 DIAGNOSIS — J06.9 ACUTE URI: Primary | ICD-10-CM

## 2025-05-13 PROCEDURE — 99213 OFFICE O/P EST LOW 20 MIN: CPT | Performed by: FAMILY MEDICINE

## 2025-05-13 RX ORDER — BENZONATATE 200 MG/1
200 CAPSULE ORAL 3 TIMES DAILY PRN
Qty: 30 CAPSULE | Refills: 0 | Status: SHIPPED | OUTPATIENT
Start: 2025-05-13

## 2025-05-13 RX ORDER — AMOXICILLIN 875 MG/1
875 TABLET, COATED ORAL 2 TIMES DAILY
Qty: 20 TABLET | Refills: 0 | Status: SHIPPED | OUTPATIENT
Start: 2025-05-13 | End: 2025-05-23

## 2025-05-13 NOTE — PROGRESS NOTES
"Name: Elis Patterson      : 1985      MRN: [id number]  Encounter Provider: Korin Caballero MD  Encounter Date: 2025   Encounter department: [facility]  :  Assessment & Plan  Acute URI  Persistent for 2 weeks  will treat amoxicillin     Orders:    benzonatate (TESSALON) 200 MG capsule; Take 1 capsule (200 mg total) by mouth 3 (three) times a day as needed for cough    amoxicillin (AMOXIL) 875 mg tablet; Take 1 tablet (875 mg total) by mouth 2 (two) times a day for 10 days           History of Present Illness   Pt with cough congestion body aches for 2 weeks  had initial fever none now     Cough  This is a recurrent problem. The current episode started 1 to 4 weeks ago. The problem has been unchanged. The problem occurs hourly. The cough is Non-productive. Associated symptoms include chest pain, ear congestion, ear pain, a fever, headaches, myalgias, nasal congestion, postnasal drip, rhinorrhea, a sore throat and shortness of breath. Pertinent negatives include no chills, rash or wheezing. Nothing aggravates the symptoms. Risk factors for lung disease include smoking/tobacco exposure.   Headache  Fever  Associated symptoms include chest pain, coughing, a fever, headaches, myalgias and a sore throat. Pertinent negatives include no chills, congestion or rash.     Review of Systems   Constitutional:  Positive for fever. Negative for chills.   HENT:  Positive for ear pain, postnasal drip, rhinorrhea and sore throat. Negative for congestion, mouth sores and sinus pressure.    Respiratory:  Positive for cough and shortness of breath. Negative for wheezing.    Cardiovascular:  Positive for chest pain.   Musculoskeletal:  Positive for myalgias.   Skin:  Negative for rash.   Neurological:  Positive for headaches.       Objective   /72 (BP Location: Left arm, Patient Position: Sitting, Cuff Size: Standard)   Pulse 90   Temp 97.9 °F (36.6 °C) (Tympanic)   Resp 16   Ht 5' 1\" " (1.549 m)   Wt 70.3 kg (155 lb)   LMP 09/26/2023   SpO2 98%   BMI 29.29 kg/m²      Physical Exam  Vitals and nursing note reviewed.   Constitutional:       General: She is not in acute distress.     Appearance: Normal appearance. She is well-developed. She is not ill-appearing.   HENT:      Head: Normocephalic.      Right Ear: Tympanic membrane normal. There is no impacted cerumen.      Left Ear: Tympanic membrane normal.      Nose: No mucosal edema, congestion or rhinorrhea.      Right Sinus: No maxillary sinus tenderness or frontal sinus tenderness.      Left Sinus: No maxillary sinus tenderness or frontal sinus tenderness.      Mouth/Throat:      Mouth: Mucous membranes are moist.      Pharynx: Oropharynx is clear. No oropharyngeal exudate or posterior oropharyngeal erythema.   Eyes:      Extraocular Movements: Extraocular movements intact.      Conjunctiva/sclera: Conjunctivae normal.      Pupils: Pupils are equal, round, and reactive to light.   Cardiovascular:      Rate and Rhythm: Normal rate and regular rhythm.      Heart sounds: Normal heart sounds.   Pulmonary:      Effort: Pulmonary effort is normal. No respiratory distress.      Breath sounds: Normal breath sounds. No wheezing, rhonchi or rales.   Musculoskeletal:      Cervical back: Normal range of motion and neck supple.   Lymphadenopathy:      Cervical: No cervical adenopathy.   Neurological:      General: No focal deficit present.      Mental Status: She is alert and oriented to person, place, and time.   Psychiatric:         Mood and Affect: Mood normal.

## 2025-05-13 NOTE — ASSESSMENT & PLAN NOTE
Persistent for 2 weeks  will treat amoxicillin     Orders:    benzonatate (TESSALON) 200 MG capsule; Take 1 capsule (200 mg total) by mouth 3 (three) times a day as needed for cough    amoxicillin (AMOXIL) 875 mg tablet; Take 1 tablet (875 mg total) by mouth 2 (two) times a day for 10 days

## 2025-05-29 ENCOUNTER — PROCEDURE VISIT (OUTPATIENT)
Dept: OBGYN CLINIC | Facility: CLINIC | Age: 40
End: 2025-05-29

## 2025-05-29 VITALS
WEIGHT: 153.6 LBS | BODY MASS INDEX: 29 KG/M2 | SYSTOLIC BLOOD PRESSURE: 118 MMHG | HEIGHT: 61 IN | DIASTOLIC BLOOD PRESSURE: 70 MMHG

## 2025-05-29 DIAGNOSIS — N30.10 INTERSTITIAL CYSTITIS: Primary | ICD-10-CM

## 2025-05-29 NOTE — PROGRESS NOTES
"Assessment/Plan:     Diagnoses and all orders for this visit:    Interstitial cystitis      39-year-old female  Lower pelvic pain/urgency frequency/IC symptom respond to bladder instillation  Diverticulosis  Smoker  Had robotic TLH BS secondary to adenomyosis  History of trichomoniasis  Plan  To follow-up with GI  To continue bladder diet/Kegel  Bladder instillation done today  Return to office in 2 weeks for bladder instillation    Subjective:      Patient ID: Elis Patterson is a 39 y.o. female.    HPI  Patient seen evaluated presented office today for bladder instillation secondary to interstitial cystitis  Patient said symptoms significantly improved desires to continue  Denies any urgency frequency or dysuria  Denies any pelvic pain  Procedure explained and discussed with patient all patient questions answered patient was satisfied  The following portions of the patient's history were reviewed and updated as appropriate: allergies, current medications, past family history, past medical history, past social history, past surgical history and problem list.    Review of Systems      Objective:      /70 (BP Location: Left arm, Patient Position: Sitting, Cuff Size: Adult)   Ht 5' 1\" (1.549 m)   Wt 69.7 kg (153 lb 9.6 oz)   LMP 09/26/2023   BMI 29.02 kg/m²          Physical Exam        Universal Protocol:  Consent: Verbal consent obtained  Risks and benefits: risks, benefits and alternatives were discussed  Consent given by: patient  Patient understanding: patient states understanding of the procedure being performed  Patient consent: the patient's understanding of the procedure matches consent given  Procedure consent: procedure consent matches procedure scheduled  Patient identity confirmed: verbally with patient    Bladder catheterization    Date/Time: 5/29/2025 3:45 PM    Performed by: Brett Everett MD  Authorized by: Brett Everett MD    Consent:     Consent given by:  Patient  Universal protocol: "     Procedure explained and questions answered to patient or proxy's satisfaction: yes      Patient identity confirmed:  Verbally with patient  Pre-procedure details:     Procedure purpose:  Therapeutic    Preparation: Patient was prepped and draped in usual sterile fashion    Anesthesia (see MAR for exact dosages):     Anesthesia method:  Topical application    Topical anesthetic:  Lidocaine gel  Procedure details:     Catheter type:  [place]    Catheter size:  8 Fr    Number of attempts:  1    Successful placement: yes      Urine characteristics:  Clear  Post-procedure details:     Patient tolerance of procedure:  Tolerated well, no immediate complications  Comments:      Bladder catheterization performed following that bladder instillation was done using 40,000 international unit of heparin mixed with 10 mL of sterile water mixed with 5 mL of bicarb followed by 3 cc of lidocaine gel  Patient tolerated procedure well

## 2025-06-03 PROBLEM — E66.09 CLASS 1 OBESITY DUE TO EXCESS CALORIES WITHOUT SERIOUS COMORBIDITY WITH BODY MASS INDEX (BMI) OF 30.0 TO 30.9 IN ADULT: Status: RESOLVED | Noted: 2024-06-07 | Resolved: 2025-06-03

## 2025-06-03 PROBLEM — E78.5 DYSLIPIDEMIA: Status: ACTIVE | Noted: 2025-06-03

## 2025-06-03 PROBLEM — E66.811 CLASS 1 OBESITY DUE TO EXCESS CALORIES WITHOUT SERIOUS COMORBIDITY WITH BODY MASS INDEX (BMI) OF 30.0 TO 30.9 IN ADULT: Status: RESOLVED | Noted: 2024-06-07 | Resolved: 2025-06-03

## 2025-06-03 PROBLEM — J06.9 ACUTE URI: Status: RESOLVED | Noted: 2023-09-25 | Resolved: 2025-06-03

## 2025-06-03 NOTE — PROGRESS NOTES
Name: Elis Patterson      : 1985      MRN: [id number]  Encounter Provider: Korin Caballero MD  Encounter Date: 2025   Encounter department: [facility]  :  Assessment & Plan  Dyslipidemia  Last  recheck          Gastroesophageal reflux disease without esophagitis  Ok on nexium per GI for chronic acitve gastritis         Major depressive disorder with single episode, in full remission (HCC)  Ok on lexapro and wellbutrin         Chronic bilateral low back pain with bilateral sciatica  On robaxin   meloxicam  pt seeing spine and pain management          Moderate persistent asthma without complication  Nio recent asthma flare up s         Foot pain, bilateral  Followed by podiatry          Cigarette nicotine dependence with nicotine-induced disorder  Advised to stop pt does not want to          Annual physical exam    Orders:    CBC and differential; Future    Comprehensive metabolic panel; Future    Lipid panel; Future    TSH, 3rd generation; Future    Hemoglobin A1C; Future           History of Present Illness   Patient here for annual physical' Pt is here for interval visit and evaluation of multiple medical problems, review of medications, labs, Health Maintenance and any recent specialty consults        Review of Systems   Constitutional:  Negative for appetite change, chills, fatigue and fever.   Respiratory:  Negative for cough, chest tightness and shortness of breath.    Cardiovascular:  Negative for chest pain, palpitations and leg swelling.   Gastrointestinal:  Negative for abdominal pain, constipation, diarrhea, nausea and vomiting.   Genitourinary:  Negative for difficulty urinating and frequency.   Musculoskeletal:  Negative for arthralgias, back pain, gait problem and neck pain.   Skin:  Negative for rash.   Neurological:  Negative for dizziness, weakness, light-headedness, numbness and headaches.   Hematological:  Does not bruise/bleed easily.  Pt came from ED around 0700. Pt is A&Ox4, VSS on room air. NPO with ice chips maintained. IV fluids infusing. Voiding freely. No bowel movements during the day. NG tube to LIS. Remote tele in place, no calls. Up with SBA.  Call light within reach, will cont "  Psychiatric/Behavioral:  Negative for dysphoric mood and sleep disturbance. The patient is not nervous/anxious.        Objective   /80 (BP Location: Left arm, Patient Position: Sitting, Cuff Size: Standard)   Pulse 84   Temp (!) 97.4 °F (36.3 °C) (Tympanic)   Resp 16   Ht 5' 1\" (1.549 m)   Wt 69.9 kg (154 lb)   LMP 09/26/2023   SpO2 97%   BMI 29.10 kg/m²      Physical Exam  Vitals and nursing note reviewed.   Constitutional:       General: She is not in acute distress.     Appearance: Normal appearance. She is well-developed and normal weight.   HENT:      Head: Normocephalic and atraumatic.      Right Ear: Tympanic membrane, ear canal and external ear normal.      Left Ear: Tympanic membrane, ear canal and external ear normal.      Nose: Nose normal.      Mouth/Throat:      Mouth: Mucous membranes are moist.      Pharynx: Oropharynx is clear. No oropharyngeal exudate or posterior oropharyngeal erythema.     Eyes:      Extraocular Movements: Extraocular movements intact.      Conjunctiva/sclera: Conjunctivae normal.      Pupils: Pupils are equal, round, and reactive to light.       Cardiovascular:      Rate and Rhythm: Normal rate and regular rhythm.      Pulses: Normal pulses.      Heart sounds: Normal heart sounds. No murmur heard.  Pulmonary:      Effort: Pulmonary effort is normal. No respiratory distress.      Breath sounds: Normal breath sounds. No wheezing or rales.   Abdominal:      General: Bowel sounds are normal. There is no distension.      Palpations: Abdomen is soft. There is no mass.      Tenderness: There is no abdominal tenderness. There is no right CVA tenderness, left CVA tenderness, guarding or rebound.      Hernia: No hernia is present.     Musculoskeletal:         General: No swelling or tenderness. Normal range of motion.      Cervical back: Normal range of motion and neck supple.      Right lower leg: No edema.      Left lower leg: No edema.     Skin:     General: Skin is warm " anxiety  - Utilize distraction and/or relaxation techniques  - Monitor for opioid side effects  - Notify MD/LIP if interventions unsuccessful or patient reports new pain  - Anticipate increased pain with activity and pre-medicate as appropriate  Outcome: P PO as ordered and tolerated  - Nasogastric tube to low intermittent suction as ordered  - Evaluate effectiveness of ordered antiemetic medications  - Provide nonpharmacologic comfort measures as appropriate  - Advance diet as tolerated, if ordered  - Obtai and dry.      Capillary Refill: Capillary refill takes less than 2 seconds.      Findings: No rash.      Comments: No abn  moles seen     Neurological:      General: No focal deficit present.      Mental Status: She is alert and oriented to person, place, and time.      Cranial Nerves: No cranial nerve deficit.      Sensory: No sensory deficit.      Motor: No weakness.      Coordination: Coordination normal.      Gait: Gait normal.      Deep Tendon Reflexes: Reflexes normal.     Psychiatric:         Mood and Affect: Mood normal.         Behavior: Behavior normal.     Depression Screening Follow-up Plan: Patient's depression screening was positive with a PHQ-9 score of 6. Patient advised to follow-up with PCP for further management.  Answers submitted by the patient for this visit:  Annual Physical (Submitted on 6/4/2025)  Diet/Nutrition choices: no special diet, portion control, limited junk food, consuming 3-5 servings of fruits/vegetables daily, adequate fiber intake, adequate whole grain intake  Exercise choices: walking, moderate cardiovascular exercise, 5-7 times a week on average, 1-2 hours on average  Sleep choices: sleeps poorly, 1-3 hours of sleep on average  Hearing choices: normal hearing right ear, normal hearing left ear  Vision choices: vision problems, most recent eye exam > 1 year ago  Do you currently have an OB/GYN provider that you routinely follow with?: Yes  Any history of sexual transmitted disease/infection?: No  Contraception: hysterectomy  Do you have an advance directive/living will?: No  Do you have a durable power of  (POA)?: No

## 2025-06-06 ENCOUNTER — EVALUATION (OUTPATIENT)
Dept: PHYSICAL THERAPY | Facility: CLINIC | Age: 40
End: 2025-06-06
Payer: COMMERCIAL

## 2025-06-06 DIAGNOSIS — M54.6 THORACIC SPINE PAIN: ICD-10-CM

## 2025-06-06 DIAGNOSIS — M54.50 CHRONIC BILATERAL LOW BACK PAIN, UNSPECIFIED WHETHER SCIATICA PRESENT: ICD-10-CM

## 2025-06-06 DIAGNOSIS — G89.29 CHRONIC LEFT SHOULDER PAIN: ICD-10-CM

## 2025-06-06 DIAGNOSIS — G89.29 CHRONIC BILATERAL LOW BACK PAIN, UNSPECIFIED WHETHER SCIATICA PRESENT: ICD-10-CM

## 2025-06-06 DIAGNOSIS — M25.511 CHRONIC RIGHT SHOULDER PAIN: ICD-10-CM

## 2025-06-06 DIAGNOSIS — M79.672 HEEL PAIN, BILATERAL: ICD-10-CM

## 2025-06-06 DIAGNOSIS — M54.2 NECK PAIN: Primary | ICD-10-CM

## 2025-06-06 DIAGNOSIS — M79.672 FOOT PAIN, BILATERAL: ICD-10-CM

## 2025-06-06 DIAGNOSIS — M79.671 FOOT PAIN, BILATERAL: ICD-10-CM

## 2025-06-06 DIAGNOSIS — M25.512 CHRONIC LEFT SHOULDER PAIN: ICD-10-CM

## 2025-06-06 DIAGNOSIS — G89.29 CHRONIC RIGHT SHOULDER PAIN: ICD-10-CM

## 2025-06-06 DIAGNOSIS — M79.671 HEEL PAIN, BILATERAL: ICD-10-CM

## 2025-06-06 PROCEDURE — 97110 THERAPEUTIC EXERCISES: CPT

## 2025-06-06 PROCEDURE — 97161 PT EVAL LOW COMPLEX 20 MIN: CPT

## 2025-06-06 NOTE — HOME EXERCISE EDUCATION
Program_ID:[id number]   Access Code: [email or web address]  URL: [email or web address]  Date: 06-  Prepared By: Fermin Villarreal    Program Notes      Exercises      - Supine Bridge - 1-2 x daily - 3-5 x weekly - 2-3 sets - 10-15 reps      - Straight Leg Raise - 1-2 x daily - 3-5 x weekly - 2-3 sets - 10-15 reps      - Clamshell with Resistance - 1 x daily - 3-5 x weekly - 2-3 sets - 10-20 reps      - Seated Calf Towel Stretch - 1-2 x daily - 7 x weekly -  sets - 3-5 reps - 30sec hold      - Heel Raises with Counter Support - 1-2 x daily - 7 x weekly - 2- sets - 20-30 reps      - Seated Scapular Retraction - 3-5 x daily - 7 x weekly - 2-3 sets - 10-15 reps

## 2025-06-06 NOTE — PROGRESS NOTES
PT Evaluation     Today's date: 2025  Patient name: Elis Patterson  : 1985  MRN: [id number]  Referring provider: Korin Caballero MD  Dx:   Encounter Diagnosis     ICD-10-CM    1. Neck pain  M54.2       2. Chronic left shoulder pain  M25.512     G89.29       3. Chronic right shoulder pain  M25.511     G89.29       4. Thoracic spine pain  M54.6       5. Chronic bilateral low back pain, unspecified whether sciatica present  M54.50     G89.29       6. Heel pain, bilateral  M79.671     M79.672       7. Foot pain, bilateral  M79.671     M79.672           Start Time: 0807  Stop Time: 0900  Total time in clinic (min): 53 minutes    Assessment  Impairments: abnormal gait, abnormal muscle tone, abnormal or restricted ROM, abnormal movement, activity intolerance, impaired balance, impaired physical strength, lacks appropriate home exercise program, pain with function, participation limitations, activity limitations and endurance  Symptom irritability: moderate    Assessment details: Problem List:  1) cervicothoracic spine hypomobility  2) BUE strength impairment  3) lumbar spine hypomobility  4) BLE strength impairment  5) B ankle mobility impairment    Elis Patterson is a pleasant 39 y.o. female who presents with R>L neck, R>L shoulder, midback, low back, and R>L ankle/foot pain. She has cervical, thoracic, and lumbar spine hypomobility, BUE and BLE strength impairment, and B ankle hypomobility resulting in pain with turning head and body to the right, pain with lifting objects from the floor, pain with forward bending, and pain with walking. There is no clear directional preference for the spine at this point. No further referral appears necessary at this time based upon examination results.  I expect she will improve with general mobility and strengthening interventions for the spine, UEs, LEs, and ankles/feet. Due to insurance visit limitation, plan to follow up next week and review comprehensive home  exercise program. Reviewed and performed some HEP today.    Comparable signs:  1) Turning head right  2) Turning thoracic/lumbar spine right  3) sitting  4) walking  Understanding of Dx/Px/POC: fair     Prognosis: fair    Goals  Short term (1-3 weeks)  1. Pt will demo consistent and independent performance of progressive HEP.  2. Pt will demo ability to manage symptoms independently.  Long term (4-8 weeks)  1. Pt will demo 2/10 or lower low back pain with forward bending.  2. Pt will demo ability to lift 25lbs from floor to waist level.  3. Pt will demo ability to look to the side without neck pain bilaterally.  4. Pt will report 2/10 or lower ankle/foot pain following a full day of work.    Plan  Patient would benefit from: skilled physical therapy  Planned modality interventions: biofeedback, thermotherapy: hydrocollator packs and cryotherapy    Planned therapy interventions: abdominal trunk stabilization, joint mobilization, manual therapy, neuromuscular re-education, strengthening, flexibility, stretching, functional ROM exercises, therapeutic activities, gait training, therapeutic exercise, home exercise program and IASTM    Frequency: 1-2x week  Duration in weeks: 10  Plan of Care beginning date: 6/6/2025  Plan of Care expiration date: 8/15/2025  Treatment plan discussed with: patient        Subjective Evaluation    History of Present Illness  Mechanism of injury: Patient is a 39 y.o. Female who reports to OP PT for evaluation and treatment of neck, low back, and B foot pain. She reports pain at the base of the skull that travels down both sides of the neck R>L to the upper trap area and midback. This pain feels connected to the pain in the low back, which is on the midline. She reports that all of these pain areas aggravate and ease together most of the time. Aggravating factors for neck and upper traps include turning head to the right, looking up>down; easing factors include heat, ice, and  medication.  Aggravating factors for middle and low back include: bending forward, sitting for long periods of time, turning R>L, and laying flat on her back.    She reports B heel pain that improved after cortisone injections (two on the right, one on the left). The pain is located directly the bottom of the heel, base of calcaneus. There is also pain on the achilles tendon. Both pain areas are intermittent and can be present separately from each other. She reports intermittent muscle spasms and cramps in the calf, which occur at night after very active days. Reports numbness that surrounds the right knee (mid-thigh to mid-shin) and in B feet R>L (bottom of foot to the toes).     Pain levels  Neck/shoulder: C 4/10, B 1/10, W 10/10  Midback: C 5/10, B 1/10, W 9/10  Low back: C 8/10, B 1/10, W: 9/10  R heel: C 2/10, B 1/10, W 7/10  L heel: C 2/10, B 1/10, W 6/10    Denies history of cancer, unexpected weight change, fever/sweats/chills, bowel/bladder changes, saddle paresthesia.    Patient Goals  Patient goals for therapy: increased strength, decreased pain, increased motion and return to sport/leisure activities          Objective    L1-S2 dermatomes and myotomes WNL     AROM R L   Lumbar flexion WNL   Lumbar extension Mod limitation   Lumbar side bend WNL WNL   Lumbar rotation WNL WNL     AROM R L   Hip flexion 120 120   Hip extension     Hip abduction     Hip adduction     Hip ER 50 50   Hip IR 45 45   Knee flexion WFL WFL   Knee extension 2 deg hyper 2 deg hyper   Ankle DF WFL WFL   Ankle PF WFL WFL   Ankle inversion     Ankle eversion       MMT R L   Hip flexion 5 4+   Hip extension     Hip abduction     Hip adduction     Hip ER     Hip IR     Knee flexion 4+ 4   Knee extension 4+ 4   Ankle DF 5 5   Ankle PF 4+ 4+   Ankle inversion 5 5   Ankle eversion 5 5     Slump test: (-) B    Straight leg raise: (-) B    C2-T2 dermatomes and myotomes WNL    AROM R L   Cervical spine flexion WNL (pain in neck and shoulder)    Cervical spine extension WNL   Cervical spine rotation Mod limitation, Pain (neck, head), dizziness, lightheadedness WNL   Cervical spine side bend Min cabrera, pain L necek WNL, pain in R upper trap   Cervical spine protraction WNL   Cervical spine retraction WNL      AROM R L   Shoulder flexion WNL WNL   Shoulder extension     Shoulder abduction WNL, pain in shoulder and upper back WNL, pain in upper shoulder and back   Shoulder adduction     Shoulder ER Functional reach WNL Functional reach WNL   Shoulder IR Functional reach WNL Functional reach WNL   Elbow flexion WNL WNL   Elbow extension WNL WNL   Supination     Pronation     Wrist flexion     Wrist extension     Wrist radial deviation     Wrist ulnar deviation        MMT R L   Shoulder flexion 4+ 4+   Shoulder extension     Shoulder abduction 5 5   Shoulder adduction     Shoulder ER 5 5   Shoulder IR 5 5   Elbow flexion 5 5   Elbow extension 5 5   Supination     Pronation     Wrist flexion 5 5   Wrist extension 5 5   Wrist radial deviation     Wrist ulnar deviation                     Insurance ReEval POC expires Auth Status Total   Visits  Start date  Expiration date PT/OT + Visit Limit? Co-Insurance [facility]   [employer] 7/6/25 NV NV NV NV NV 30 $60 Co-pay                                                        Visit/Unit Tracking  AUTH Status: NV Date                Visits  Authed: NV Used                 Remaining                        Precautions: none    Manuals 6/6                                                      Neuro Re-Ed           PPT           PPT + march Paloff press           PPT+clamshell           PPT+SLR           SL hip abd           Edu           Ther Ex           Scap retraction X10 B          Bridge x10          Clamshell edu          SLR X10 ea          Heel raise X20 ea          Plantar fascia stretch [id number]                     LTR           Open book           Prone press up           Standing hip ext           Glute bridge                                  Edu Re: examination findings, POC, HEP          Ther Activity           Total gym squat           Leg press           Gait Training                                 Modalities

## 2025-06-09 ENCOUNTER — PROCEDURE VISIT (OUTPATIENT)
Dept: OBGYN CLINIC | Facility: CLINIC | Age: 40
End: 2025-06-09

## 2025-06-09 ENCOUNTER — OFFICE VISIT (OUTPATIENT)
Dept: FAMILY MEDICINE CLINIC | Facility: CLINIC | Age: 40
End: 2025-06-09
Payer: COMMERCIAL

## 2025-06-09 ENCOUNTER — OFFICE VISIT (OUTPATIENT)
Dept: PODIATRY | Facility: CLINIC | Age: 40
End: 2025-06-09
Payer: COMMERCIAL

## 2025-06-09 VITALS — WEIGHT: 156 LBS | BODY MASS INDEX: 29.45 KG/M2 | OXYGEN SATURATION: 97 % | HEART RATE: 85 BPM | HEIGHT: 61 IN

## 2025-06-09 VITALS
SYSTOLIC BLOOD PRESSURE: 120 MMHG | WEIGHT: 154 LBS | HEIGHT: 61 IN | OXYGEN SATURATION: 97 % | TEMPERATURE: 97.4 F | DIASTOLIC BLOOD PRESSURE: 80 MMHG | BODY MASS INDEX: 29.07 KG/M2 | RESPIRATION RATE: 16 BRPM | HEART RATE: 84 BPM

## 2025-06-09 VITALS
BODY MASS INDEX: 28.96 KG/M2 | HEIGHT: 61 IN | SYSTOLIC BLOOD PRESSURE: 120 MMHG | DIASTOLIC BLOOD PRESSURE: 88 MMHG | WEIGHT: 153.4 LBS

## 2025-06-09 DIAGNOSIS — M79.672 FOOT PAIN, BILATERAL: ICD-10-CM

## 2025-06-09 DIAGNOSIS — G89.29 CHRONIC BILATERAL LOW BACK PAIN WITH BILATERAL SCIATICA: ICD-10-CM

## 2025-06-09 DIAGNOSIS — J45.40 MODERATE PERSISTENT ASTHMA WITHOUT COMPLICATION: ICD-10-CM

## 2025-06-09 DIAGNOSIS — M79.671 FOOT PAIN, BILATERAL: ICD-10-CM

## 2025-06-09 DIAGNOSIS — M79.672 PAIN IN BOTH FEET: ICD-10-CM

## 2025-06-09 DIAGNOSIS — M72.2 PLANTAR FASCIITIS, BILATERAL: Primary | ICD-10-CM

## 2025-06-09 DIAGNOSIS — F32.5 MAJOR DEPRESSIVE DISORDER WITH SINGLE EPISODE, IN FULL REMISSION (HCC): ICD-10-CM

## 2025-06-09 DIAGNOSIS — K21.9 GASTROESOPHAGEAL REFLUX DISEASE WITHOUT ESOPHAGITIS: ICD-10-CM

## 2025-06-09 DIAGNOSIS — F17.219 CIGARETTE NICOTINE DEPENDENCE WITH NICOTINE-INDUCED DISORDER: ICD-10-CM

## 2025-06-09 DIAGNOSIS — Z00.00 ANNUAL PHYSICAL EXAM: ICD-10-CM

## 2025-06-09 DIAGNOSIS — E78.5 DYSLIPIDEMIA: Primary | ICD-10-CM

## 2025-06-09 DIAGNOSIS — M54.42 CHRONIC BILATERAL LOW BACK PAIN WITH BILATERAL SCIATICA: ICD-10-CM

## 2025-06-09 DIAGNOSIS — M79.671 PAIN IN BOTH FEET: ICD-10-CM

## 2025-06-09 DIAGNOSIS — M54.41 CHRONIC BILATERAL LOW BACK PAIN WITH BILATERAL SCIATICA: ICD-10-CM

## 2025-06-09 DIAGNOSIS — N30.10 INTERSTITIAL CYSTITIS: Primary | ICD-10-CM

## 2025-06-09 PROBLEM — M25.522 LEFT ELBOW PAIN: Status: RESOLVED | Noted: 2024-07-24 | Resolved: 2025-06-09

## 2025-06-09 PROBLEM — L24.9 IRRITANT CONTACT DERMATITIS: Status: RESOLVED | Noted: 2024-05-03 | Resolved: 2025-06-09

## 2025-06-09 PROCEDURE — 99213 OFFICE O/P EST LOW 20 MIN: CPT | Performed by: PODIATRIST

## 2025-06-09 PROCEDURE — 99214 OFFICE O/P EST MOD 30 MIN: CPT | Performed by: FAMILY MEDICINE

## 2025-06-09 PROCEDURE — 99395 PREV VISIT EST AGE 18-39: CPT | Performed by: FAMILY MEDICINE

## 2025-06-09 RX ORDER — PREDNISONE 20 MG/1
20 TABLET ORAL DAILY
Qty: 10 TABLET | Refills: 0 | Status: SHIPPED | OUTPATIENT
Start: 2025-06-09

## 2025-06-09 NOTE — PROGRESS NOTES
"Assessment/Plan:     Diagnoses and all orders for this visit:    Interstitial cystitis         39-year-old female  Lower pelvic pain/urgency frequency/IC symptom respond to bladder instillation  Diverticulosis  Smoker  Had robotic TLH BS secondary to adenomyosis  History of trichomoniasis  Plan  To follow-up with GI  To continue bladder diet/Kegel  Bladder instillation done today  Return to office in 2 weeks for bladder instillation         Subjective:      Patient ID: Elis Patterson is a 39 y.o. female.    HPI  Patient seen evaluated present to the office today for bladder instillation secondary to interstitial cystitis  Procedure explained and discussed with patient all patient questions answered and patient was satisfied    The following portions of the patient's history were reviewed and updated as appropriate: allergies, current medications, past family history, past medical history, past social history, past surgical history and problem list.    Review of Systems      Objective:      /88 (BP Location: Left arm, Patient Position: Sitting, Cuff Size: Adult)   Ht 5' 1\" (1.549 m)   Wt 69.6 kg (153 lb 6.4 oz)   LMP 09/26/2023   BMI 28.98 kg/m²          Physical Exam        Universal Protocol:  Consent: Verbal consent obtained  Risks and benefits: risks, benefits and alternatives were discussed  Consent given by: patient  Time out: Immediately prior to procedure a \"time out\" was called to verify the correct patient, procedure, equipment, support staff and site/side marked as required.  Patient understanding: patient states understanding of the procedure being performed  Patient consent: the patient's understanding of the procedure matches consent given  Procedure consent: procedure consent matches procedure scheduled  Relevant documents: relevant documents present and verified  Patient identity confirmed: verbally with patient    Bladder catheterization    Date/Time: 6/9/2025 3:45 PM    Performed by: " Brett Everett MD  Authorized by: Brett Everett MD    Consent:     Consent given by:  Patient  Universal protocol:     Procedure explained and questions answered to patient or proxy's satisfaction: yes      Relevant documents present and verified: yes      Immediately prior to procedure a time out was called: yes      Patient identity confirmed:  Verbally with patient  Pre-procedure details:     Procedure purpose:  Therapeutic    Preparation: Patient was prepped and draped in usual sterile fashion    Anesthesia (see MAR for exact dosages):     Anesthesia method:  Topical application    Topical anesthetic:  Lidocaine gel  Procedure details:     Catheter type:  Non-latex    Catheter size:  8 Fr    Number of attempts:  1    Successful placement: yes      Urine characteristics:  Clear  Post-procedure details:     Patient tolerance of procedure:  Tolerated well, no immediate complications  Comments:      Bladder catheterization performed following that bladder instillation was done using 40,000 international unit of heparin mixed with 10 mL of sterile water mixed with 5 mL of bicarb followed by 3 cc of lidocaine gel patient tolerated procedure well

## 2025-06-09 NOTE — ASSESSMENT & PLAN NOTE
Orders:    CBC and differential; Future    Comprehensive metabolic panel; Future    Lipid panel; Future    TSH, 3rd generation; Future    Hemoglobin A1C; Future

## 2025-06-10 ENCOUNTER — APPOINTMENT (OUTPATIENT)
Dept: LAB | Facility: CLINIC | Age: 40
End: 2025-06-10
Attending: FAMILY MEDICINE
Payer: COMMERCIAL

## 2025-06-10 ENCOUNTER — RESULTS FOLLOW-UP (OUTPATIENT)
Dept: FAMILY MEDICINE CLINIC | Facility: CLINIC | Age: 40
End: 2025-06-10

## 2025-06-10 DIAGNOSIS — Z00.00 ANNUAL PHYSICAL EXAM: ICD-10-CM

## 2025-06-10 LAB
ALBUMIN SERPL BCG-MCNC: 4.3 G/DL (ref 3.5–5)
ALP SERPL-CCNC: 63 U/L (ref 34–104)
ALT SERPL W P-5'-P-CCNC: 12 U/L (ref 7–52)
ANION GAP SERPL CALCULATED.3IONS-SCNC: 5 MMOL/L (ref 4–13)
AST SERPL W P-5'-P-CCNC: 10 U/L (ref 13–39)
BASOPHILS # BLD AUTO: 0.08 THOUSANDS/ÂΜL (ref 0–0.1)
BASOPHILS NFR BLD AUTO: 1 % (ref 0–1)
BILIRUB SERPL-MCNC: 0.55 MG/DL (ref 0.2–1)
BUN SERPL-MCNC: 14 MG/DL (ref 5–25)
CALCIUM SERPL-MCNC: 8.6 MG/DL (ref 8.4–10.2)
CHLORIDE SERPL-SCNC: 103 MMOL/L (ref 96–108)
CHOLEST SERPL-MCNC: 187 MG/DL (ref ?–200)
CO2 SERPL-SCNC: 28 MMOL/L (ref 21–32)
CREAT SERPL-MCNC: 0.68 MG/DL (ref 0.6–1.3)
EOSINOPHIL # BLD AUTO: 0.04 THOUSAND/ÂΜL (ref 0–0.61)
EOSINOPHIL NFR BLD AUTO: 0 % (ref 0–6)
ERYTHROCYTE [DISTWIDTH] IN BLOOD BY AUTOMATED COUNT: 12.7 % (ref 11.6–15.1)
EST. AVERAGE GLUCOSE BLD GHB EST-MCNC: 114 MG/DL
GFR SERPL CREATININE-BSD FRML MDRD: 112 ML/MIN/1.73SQ M
GLUCOSE P FAST SERPL-MCNC: 108 MG/DL (ref 65–99)
HBA1C MFR BLD: 5.6 %
HCT VFR BLD AUTO: 45.1 % (ref 34.8–46.1)
HDLC SERPL-MCNC: 53 MG/DL
HGB BLD-MCNC: 15.4 G/DL (ref 11.5–15.4)
IMM GRANULOCYTES # BLD AUTO: 0.08 THOUSAND/UL (ref 0–0.2)
IMM GRANULOCYTES NFR BLD AUTO: 1 % (ref 0–2)
LDLC SERPL CALC-MCNC: 117 MG/DL (ref 0–100)
LYMPHOCYTES # BLD AUTO: 2.26 THOUSANDS/ÂΜL (ref 0.6–4.47)
LYMPHOCYTES NFR BLD AUTO: 18 % (ref 14–44)
MCH RBC QN AUTO: 32.7 PG (ref 26.8–34.3)
MCHC RBC AUTO-ENTMCNC: 34.1 G/DL (ref 31.4–37.4)
MCV RBC AUTO: 96 FL (ref 82–98)
MONOCYTES # BLD AUTO: 0.68 THOUSAND/ÂΜL (ref 0.17–1.22)
MONOCYTES NFR BLD AUTO: 5 % (ref 4–12)
NEUTROPHILS # BLD AUTO: 9.66 THOUSANDS/ÂΜL (ref 1.85–7.62)
NEUTS SEG NFR BLD AUTO: 75 % (ref 43–75)
NONHDLC SERPL-MCNC: 134 MG/DL
NRBC BLD AUTO-RTO: 0 /100 WBCS
PLATELET # BLD AUTO: 218 THOUSANDS/UL (ref 149–390)
PMV BLD AUTO: 10.6 FL (ref 8.9–12.7)
POTASSIUM SERPL-SCNC: 3.8 MMOL/L (ref 3.5–5.3)
PROT SERPL-MCNC: 6.7 G/DL (ref 6.4–8.4)
RBC # BLD AUTO: 4.71 MILLION/UL (ref 3.81–5.12)
SODIUM SERPL-SCNC: 136 MMOL/L (ref 135–147)
TRIGL SERPL-MCNC: 84 MG/DL (ref ?–150)
TSH SERPL DL<=0.05 MIU/L-ACNC: 1.9 UIU/ML (ref 0.45–4.5)
WBC # BLD AUTO: 12.8 THOUSAND/UL (ref 4.31–10.16)

## 2025-06-10 PROCEDURE — 80053 COMPREHEN METABOLIC PANEL: CPT

## 2025-06-10 PROCEDURE — 80061 LIPID PANEL: CPT

## 2025-06-10 PROCEDURE — 84443 ASSAY THYROID STIM HORMONE: CPT

## 2025-06-10 PROCEDURE — 36415 COLL VENOUS BLD VENIPUNCTURE: CPT

## 2025-06-10 PROCEDURE — 85025 COMPLETE CBC W/AUTO DIFF WBC: CPT

## 2025-06-10 PROCEDURE — 83036 HEMOGLOBIN GLYCOSYLATED A1C: CPT

## 2025-06-12 ENCOUNTER — TELEPHONE (OUTPATIENT)
Age: 40
End: 2025-06-12

## 2025-06-12 ENCOUNTER — HOSPITAL ENCOUNTER (OUTPATIENT)
Dept: RADIOLOGY | Facility: CLINIC | Age: 40
Discharge: HOME | End: 2025-06-12
Attending: STUDENT IN AN ORGANIZED HEALTH CARE EDUCATION/TRAINING PROGRAM
Payer: COMMERCIAL

## 2025-06-12 VITALS
SYSTOLIC BLOOD PRESSURE: 112 MMHG | DIASTOLIC BLOOD PRESSURE: 78 MMHG | OXYGEN SATURATION: 95 % | TEMPERATURE: 97.9 F | RESPIRATION RATE: 20 BRPM | HEART RATE: 72 BPM

## 2025-06-12 DIAGNOSIS — M54.2 CHRONIC NECK PAIN: ICD-10-CM

## 2025-06-12 DIAGNOSIS — G89.29 CHRONIC NECK PAIN: ICD-10-CM

## 2025-06-12 PROCEDURE — 64490 INJ PARAVERT F JNT C/T 1 LEV: CPT | Performed by: PHYSICAL MEDICINE & REHABILITATION

## 2025-06-12 PROCEDURE — 64491 INJ PARAVERT F JNT C/T 2 LEV: CPT | Performed by: PHYSICAL MEDICINE & REHABILITATION

## 2025-06-12 RX ORDER — BUPIVACAINE HCL/PF 2.5 MG/ML
3 VIAL (ML) INJECTION ONCE
Status: COMPLETED | OUTPATIENT
Start: 2025-06-12 | End: 2025-06-12

## 2025-06-12 RX ADMIN — BUPIVACAINE HYDROCHLORIDE 3 ML: 2.5 INJECTION, SOLUTION EPIDURAL; INFILTRATION; INTRACAUDAL at 10:17

## 2025-06-12 NOTE — H&P
History of Present Illness: The patient is a 39 y.o. female who presents with complaints of right-sided neck pain    Past Medical History[1]    Past Surgical History[2]    Current Medications[3]    Allergies[4]    Physical Exam:   Vitals:    06/12/25 1003   BP: 112/77   Pulse: 75   Resp: 20   Temp: 97.9 °F (36.6 °C)   SpO2: 96%     General: Awake, Alert, Oriented x 3, Mood and affect appropriate  Respiratory: Respirations even and unlabored  Cardiovascular: Peripheral pulses intact; no edema  Musculoskeletal Exam: Tenderness palpation right side cervical paraspinals    ASA Score: 2         Assessment:   1. Chronic neck pain        Plan: Right C3-C5 MBB # 1        [1]   Past Medical History:  Diagnosis Date    Adenomyosis     Anxiety     Arthritis     Asthma     Depression     Difficulty walking     Dysmenorrhea     Fibroid     GERD (gastroesophageal reflux disease)     Migraine    [2]   Past Surgical History:  Procedure Laterality Date    CYSTOSCOPY N/A 06/28/2024    Procedure: CYSTOSCOPY;  Surgeon: Brett Everett MD;  Location:  MAIN OR;  Service: Gynecology    HYSTERECTOMY  06/28/24    KY LAPS TOTAL HYSTERECT 250 GM/< W/RMVL TUBE/OVARY N/A 06/28/2024    Procedure: EXAM UNDER ANESTHESIA; HYSTERECTOMY LAPAROSCOPIC TOTAL (LTH) W/ ROBOTICS AND BILATERAL SALPINGECTOMY.;  Surgeon: Brett Everett MD;  Location:  MAIN OR;  Service: Gynecology   [3]   Current Outpatient Medications:     acetaminophen (TYLENOL) 500 mg tablet, Take 500 mg by mouth every 6 (six) hours as needed for mild pain, Disp: , Rfl:     budesonide-formoterol (Symbicort) 160-4.5 mcg/act inhaler, Inhale 2 puffs 2 (two) times a day Rinse mouth after use., Disp: 10.2 g, Rfl: 1    buPROPion (WELLBUTRIN XL) 150 mg 24 hr tablet, TAKE 1 TABLET BY MOUTH EVERY DAY IN THE MORNING, Disp: 90 tablet, Rfl: 1    escitalopram (LEXAPRO) 10 mg tablet, TAKE 1 TABLET BY MOUTH EVERY DAY, Disp: 90 tablet, Rfl: 1    esomeprazole (NexIUM) 40 MG capsule, TAKE 1 CAPSULE (40  MG TOTAL) BY MOUTH EVERY MORNING., Disp: 90 capsule, Rfl: 1    Heparin Sodium, Porcine, (heparin, porcine,) 96632 UNIT/ML, To bring to the office for administration by provider, Disp: 2 mL, Rfl: 3    lidocaine (XYLOCAINE) 2 % topical gel, Apply topically as needed for mild pain, Disp: 5 mL, Rfl: 3    meloxicam (MOBIC) 15 mg tablet, Take 1 tablet (15 mg total) by mouth daily as needed for moderate pain, Disp: 30 tablet, Rfl: 3    methocarbamol (ROBAXIN) 500 mg tablet, Take 1 tablet (500 mg total) by mouth 3 (three) times a day, Disp: 30 tablet, Rfl: 1    ondansetron (ZOFRAN-ODT) 4 mg disintegrating tablet, Take 1 tablet (4 mg total) by mouth every 8 (eight) hours as needed for nausea or vomiting, Disp: 20 tablet, Rfl: 1    predniSONE 20 mg tablet, Take 1 tablet (20 mg total) by mouth daily, Disp: 10 tablet, Rfl: 0    Current Facility-Administered Medications:     bupivacaine (PF) (MARCAINE) 0.25 % injection 3 mL, 3 mL, Perineural, Once, Aravind Martell DO  [4] No Known Allergies

## 2025-06-12 NOTE — DISCHARGE INSTR - LAB

## 2025-06-12 NOTE — TELEPHONE ENCOUNTER
S/w Pt, advised to focus on pain in Rt neck, where cervical MBB procedure was performed when filling out PD. Pt verbalized understanding.

## 2025-06-12 NOTE — TELEPHONE ENCOUNTER
Caller: pt    Doctor: Dr. simmons    Reason for call: pt needs to know how to do the pain diary.    Call back#: [phone]

## 2025-06-13 ENCOUNTER — TELEPHONE (OUTPATIENT)
Dept: PAIN MEDICINE | Facility: CLINIC | Age: 40
End: 2025-06-13

## 2025-06-13 NOTE — TELEPHONE ENCOUNTER
Delayed response but appears she had >80% relief for >50% of the day  If she would like would proceed with MBB #2  Thank you

## 2025-06-16 NOTE — TELEPHONE ENCOUNTER
Left message for patient to contact the scheduling office at [phone] to schedule their procedure.

## 2025-06-20 ENCOUNTER — OFFICE VISIT (OUTPATIENT)
Dept: PHYSICAL THERAPY | Facility: CLINIC | Age: 40
End: 2025-06-20
Payer: COMMERCIAL

## 2025-06-20 DIAGNOSIS — G89.29 CHRONIC BILATERAL LOW BACK PAIN, UNSPECIFIED WHETHER SCIATICA PRESENT: ICD-10-CM

## 2025-06-20 DIAGNOSIS — M79.671 FOOT PAIN, BILATERAL: ICD-10-CM

## 2025-06-20 DIAGNOSIS — G89.29 CHRONIC RIGHT SHOULDER PAIN: ICD-10-CM

## 2025-06-20 DIAGNOSIS — M54.2 NECK PAIN: Primary | ICD-10-CM

## 2025-06-20 DIAGNOSIS — M79.672 HEEL PAIN, BILATERAL: ICD-10-CM

## 2025-06-20 DIAGNOSIS — M54.50 CHRONIC BILATERAL LOW BACK PAIN, UNSPECIFIED WHETHER SCIATICA PRESENT: ICD-10-CM

## 2025-06-20 DIAGNOSIS — M79.671 HEEL PAIN, BILATERAL: ICD-10-CM

## 2025-06-20 DIAGNOSIS — M79.672 FOOT PAIN, BILATERAL: ICD-10-CM

## 2025-06-20 DIAGNOSIS — G89.29 CHRONIC LEFT SHOULDER PAIN: ICD-10-CM

## 2025-06-20 DIAGNOSIS — M25.511 CHRONIC RIGHT SHOULDER PAIN: ICD-10-CM

## 2025-06-20 DIAGNOSIS — M25.512 CHRONIC LEFT SHOULDER PAIN: ICD-10-CM

## 2025-06-20 DIAGNOSIS — M54.6 THORACIC SPINE PAIN: ICD-10-CM

## 2025-06-20 PROCEDURE — 97110 THERAPEUTIC EXERCISES: CPT

## 2025-06-20 PROCEDURE — 97112 NEUROMUSCULAR REEDUCATION: CPT

## 2025-06-20 NOTE — PROGRESS NOTES
Annual Exam & bladder instillation     Last Annual Exam: 2024    Last PAP/HPV Test and Result: 2023, PAP/HPV neg    Last Mammo Screening: unknown    Last STD Culture Screenin2024    Current BC Method: hysterectomy

## 2025-06-20 NOTE — PROGRESS NOTES
Daily Note     Today's date: 2025  Patient name: Elis Patterson  : 1985  MRN: [id number]  Referring provider: Korin Caballero MD  Dx:   Encounter Diagnosis     ICD-10-CM    1. Neck pain  M54.2       2. Chronic left shoulder pain  M25.512     G89.29       3. Chronic right shoulder pain  M25.511     G89.29       4. Thoracic spine pain  M54.6       5. Chronic bilateral low back pain, unspecified whether sciatica present  M54.50     G89.29       6. Heel pain, bilateral  M79.671     M79.672       7. Foot pain, bilateral  M79.671     M79.672           Start Time: 08  Stop Time: 830  Total time in clinic (min): 29 minutes    Subjective: Elis reports that her feet are still in a good bit of pain L>R; gait is analgic today. Reports that her neck has been a little bit better, but her back is still hurting.      Objective: See treatment diary below      Assessment: Moderate improvement in foot pain following plantar fascia and calf stretches. Continued with general mobility and strengthening exercises, added core stabilization exercises with good tolerance. All treatments tolerated well, no adverse response. Provided print out of calf stretch in standing for HEP.      Plan: Continue per plan of care.        Insurance ReEval POC expires Auth Status Total   Visits  Start date  Expiration date PT/OT + Visit Limit? Co-Insurance [facility]   Blue cross 7/6/25 8/15/25 N/A N/A N/A N/A 30 $60 Co-pay                                                        Visit/Unit Tracking  AUTH Status: Not req Date 25              Visits  Authed: Not req Used 1 2               Remaining                        Precautions: none    Manuals                                                      Neuro Re-Ed           PPT           PPT + march           Paloff press           PPT+clamshell           PPT+SLR  2x10 ea         Dead bug  LEs only  2x10 ea         SL hip abd           Edu           Ther Ex           Scap  retraction X10 B          Bridge x10          Clamshell edu          SLR X10 ea          Heel raise X20 ea          Plantar fascia stretch [id number] 8f61ede ea         Calf stretch  Slant board 5w59tll B    Staggered stance HEP edu         LTR  32j6the ea         Open book           Prone press up           Standing hip ext           Glute bridge           Leg press  45# x15  60# x15  75# x15  90# x15  100# x15                    Edu Re: examination findings, POC, HEP HEP         Ther Activity           Total gym squat                      Gait Training                                 Modalities

## 2025-06-20 NOTE — HOME EXERCISE EDUCATION
Program_ID:[id number]   Access Code: [email or web address]  URL: [email or web address]  Date: 06-  Prepared By: Fermin Villarreal    Program Notes      Exercises      - Supine Bridge - 1-2 x daily - 3-5 x weekly - 2-3 sets - 10-15 reps      - Straight Leg Raise - 1-2 x daily - 3-5 x weekly - 2-3 sets - 10-15 reps      - Clamshell with Resistance - 1 x daily - 3-5 x weekly - 2-3 sets - 10-20 reps      - Seated Calf Towel Stretch - 1-2 x daily - 7 x weekly -  sets - 3-5 reps - 30sec hold      - Heel Raises with Counter Support - 1-2 x daily - 7 x weekly - 2- sets - 20-30 reps      - Seated Scapular Retraction - 3-5 x daily - 7 x weekly - 2-3 sets - 10-15 reps      - Standing Gastroc Stretch - 2 x daily - 7 x weekly -  sets - 3-5 reps - 30sec hold

## 2025-06-23 ENCOUNTER — ANNUAL EXAM (OUTPATIENT)
Dept: OBGYN CLINIC | Facility: CLINIC | Age: 40
End: 2025-06-23
Payer: COMMERCIAL

## 2025-06-23 VITALS — HEIGHT: 61 IN | BODY MASS INDEX: 28.55 KG/M2 | WEIGHT: 151.2 LBS

## 2025-06-23 DIAGNOSIS — Z80.3 FAMILY HISTORY OF BREAST CANCER: ICD-10-CM

## 2025-06-23 DIAGNOSIS — Z01.419 WOMEN'S ANNUAL ROUTINE GYNECOLOGICAL EXAMINATION: Primary | ICD-10-CM

## 2025-06-23 DIAGNOSIS — N30.10 INTERSTITIAL CYSTITIS: ICD-10-CM

## 2025-06-23 PROCEDURE — 51700 IRRIGATION OF BLADDER: CPT

## 2025-06-23 PROCEDURE — 99395 PREV VISIT EST AGE 18-39: CPT | Performed by: OBSTETRICS & GYNECOLOGY

## 2025-06-23 RX ORDER — HEPARIN SODIUM 1000 [USP'U]/ML
40 INJECTION, SOLUTION INTRAVENOUS; SUBCUTANEOUS ONCE
Status: COMPLETED | OUTPATIENT
Start: 2025-06-23 | End: 2025-06-23

## 2025-06-23 RX ADMIN — HEPARIN SODIUM 40 UNITS: 1000 INJECTION, SOLUTION INTRAVENOUS; SUBCUTANEOUS at 16:02

## 2025-06-23 NOTE — PROGRESS NOTES
"Subjective      Elis Patterson is a 39 y.o. female who presents for annual well woman exam.       History of abnormal Pap smear: no  Family history of uterine or ovarian cancer: no  Family history of breast cancer: yes - paternal 3 female     Menstrual History:  OB History          0    Para        Term                AB   0    Living   0         SAB   0    IAB   0    Ectopic   0    Multiple        Live Births   0               Patient's last menstrual period was 2023.       The following portions of the patient's history were reviewed and updated as appropriate: allergies, current medications, past family history, past medical history, past social history, past surgical history, and problem list.    Review of Systems  Review of Systems   Constitutional:  Negative for activity change, appetite change, chills, fatigue and fever.   Respiratory:  Negative for apnea, cough, chest tightness and shortness of breath.    Cardiovascular:  Negative for chest pain, palpitations and leg swelling.   Gastrointestinal:  Negative for abdominal pain, constipation, diarrhea, nausea and vomiting.   Genitourinary:  Negative for difficulty urinating, dysuria, flank pain, frequency, hematuria and urgency.   Neurological:  Negative for dizziness, seizures, syncope, light-headedness, numbness and headaches.   Psychiatric/Behavioral:  Negative for agitation and confusion.           Objective      Ht 5' 1\" (1.549 m)   Wt 68.6 kg (151 lb 3.2 oz)   LMP 2023   BMI 28.57 kg/m²     Physical Exam  OBGyn Exam     General:   alert and oriented, in no acute distress, alert, appears stated age, and cooperative   Heart: regular rate and rhythm, S1, S2 normal, no murmur, click, rub or gallop   Lungs: clear to auscultation bilaterally   Abdomen: soft, non-tender, without masses or organomegaly   Vulva: normal, Bartholin's, Urethra, [place]'s normal   Vagina: normal mucosa, normal discharge   Cervix: surgically absent " "  Uterus: surgically absent   Adnexa:  Breast Exam:  normal adnexa  breasts appear normal, no suspicious masses, no skin or nipple changes or axillary nodes.           Universal Protocol:  Consent: Verbal consent obtained  Risks and benefits: risks, benefits and alternatives were discussed  Consent given by: patient  Time out: Immediately prior to procedure a \"time out\" was called to verify the correct patient, procedure, equipment, support staff and site/side marked as required.  Patient understanding: patient states understanding of the procedure being performed  Patient consent: the patient's understanding of the procedure matches consent given  Procedure consent: procedure consent matches procedure scheduled  Relevant documents: relevant documents present and verified  Patient identity confirmed: verbally with patient    Bladder catheterization    Date/Time: 6/23/2025 1:45 PM    Performed by: Brett Everett MD  Authorized by: Brett Everett MD    Consent:     Consent given by:  Patient  Universal protocol:     Procedure explained and questions answered to patient or proxy's satisfaction: yes      Relevant documents present and verified: yes      Immediately prior to procedure a time out was called: yes      Patient identity confirmed:  Verbally with patient  Pre-procedure details:     Procedure purpose:  Therapeutic    Preparation: Patient was prepped and draped in usual sterile fashion    Anesthesia (see MAR for exact dosages):     Anesthesia method:  Topical application    Topical anesthetic:  Lidocaine gel  Procedure details:     Catheter type:  Non-latex    Catheter size:  8 Fr    Number of attempts:  1    Successful placement: yes      Urine characteristics:  Clear  Post-procedure details:     Patient tolerance of procedure:  Tolerated well, no immediate complications  Comments:      Bladder catheterization performed following the bladder instillation was done using 40,000 international unit of heparin " mixed with 10 mL of sterile water mixed with 5 mL of bicarb followed by 3 cc of lidocaine gel patient tolerated procedure well           Assessment      @well woman@ .    39-year-old female  Annual exam  Lower pelvic pain/urgency frequency/IC symptom respond to bladder instillation  Diverticulosis  Smoker cessation recommended  Had robotic TLH BS secondary to adenomyosis  History of trichomoniasis currently not sexually active  Plan  No Pap needed  Diet/exercise  Calcium/vitamin D  Mammogram secondary to family history of breast cancer 3 family member from paternal side  Refer to genetic testing  S lesion done today  Return to office in 3 weeks for bladder instillation     All questions answered.     There are no Patient Instructions on file for this visit.

## 2025-07-01 ENCOUNTER — OFFICE VISIT (OUTPATIENT)
Dept: PHYSICAL THERAPY | Facility: CLINIC | Age: 40
End: 2025-07-01
Payer: COMMERCIAL

## 2025-07-01 DIAGNOSIS — M79.672 FOOT PAIN, BILATERAL: ICD-10-CM

## 2025-07-01 DIAGNOSIS — M54.50 CHRONIC BILATERAL LOW BACK PAIN, UNSPECIFIED WHETHER SCIATICA PRESENT: ICD-10-CM

## 2025-07-01 DIAGNOSIS — G89.29 CHRONIC BILATERAL LOW BACK PAIN, UNSPECIFIED WHETHER SCIATICA PRESENT: ICD-10-CM

## 2025-07-01 DIAGNOSIS — M79.671 HEEL PAIN, BILATERAL: ICD-10-CM

## 2025-07-01 DIAGNOSIS — G89.29 CHRONIC LEFT SHOULDER PAIN: ICD-10-CM

## 2025-07-01 DIAGNOSIS — M54.2 NECK PAIN: Primary | ICD-10-CM

## 2025-07-01 DIAGNOSIS — M79.671 FOOT PAIN, BILATERAL: ICD-10-CM

## 2025-07-01 DIAGNOSIS — M54.6 THORACIC SPINE PAIN: ICD-10-CM

## 2025-07-01 DIAGNOSIS — M25.511 CHRONIC RIGHT SHOULDER PAIN: ICD-10-CM

## 2025-07-01 DIAGNOSIS — M79.672 HEEL PAIN, BILATERAL: ICD-10-CM

## 2025-07-01 DIAGNOSIS — M25.512 CHRONIC LEFT SHOULDER PAIN: ICD-10-CM

## 2025-07-01 DIAGNOSIS — G89.29 CHRONIC RIGHT SHOULDER PAIN: ICD-10-CM

## 2025-07-01 PROCEDURE — 97110 THERAPEUTIC EXERCISES: CPT

## 2025-07-01 NOTE — PROGRESS NOTES
Daily Note     Today's date: 2025  Patient name: Elis Patterson  : 1985  MRN: [id number]  Referring provider: Korin Caballero MD  Dx:   Encounter Diagnosis     ICD-10-CM    1. Neck pain  M54.2       2. Chronic left shoulder pain  M25.512     G89.29       3. Chronic right shoulder pain  M25.511     G89.29       4. Thoracic spine pain  M54.6       5. Chronic bilateral low back pain, unspecified whether sciatica present  M54.50     G89.29       6. Heel pain, bilateral  M79.671     M79.672       7. Foot pain, bilateral  M79.671     M79.672             Start Time: 1545  Stop Time: 1608  Total time in clinic (min): 23 minutes    Subjective: Elis reports that her feet are still in a good bit of pain L>R; gait is analgic today. Reports that her neck has been a little bit better, but her back is still hurting.      Objective: See treatment diary below      Assessment: Continued with general mobility and strengthening exercises, added HS bridge with physioball for LE and core strengthening. All treatments tolerated well, no adverse response. Provided print out of calf stretch in standing for HEP.      Plan: Continue per plan of care.        Insurance ReEval POC expires Auth Status Total   Visits  Start date  Expiration date PT/OT + Visit Limit? Co-Insurance [facility] [place]   [employer] cross 7/6/25 8/15/25 N/A N/A N/A N/A 30 $60 Co-pay                                                        Visit/Unit Tracking  AUTH Status: Not req Date 25             Visits  Authed: Not req Used 1 2 3              Remaining                        Precautions: none    Manuals                                                     Neuro Re-Ed           PPT           PPT + march           Palo press           PPT+clamshell           PPT+SLR  2x10 ea         Dead bug  LEs only  2x10 ea         SL hip abd           Edu           Ther Ex           Scap retraction X10 B          Bridge x10  HS bridge (Pball under feet)  2x15        Clamshell edu          SLR X10 ea          Heel raise X20 ea  x20 B (declined second set)        Plantar fascia stretch [id number] 3v77fok ea edu        Calf stretch  Slant board 9p01klw B    Staggered stance HEP edu         LTR  05g2osp ea         Knee to chest   18i3brk ea        Open book           Prone press up           Standing hip ext           Glute bridge           Leg press  45# x15  60# x15  75# x15  90# x15  100# x15 75# 2x15  90# 2x15  100# x15        David Rios 17# 2x15        Edu Re: examination findings, POC, HEP HEP HEP        Ther Activity           Total gym squat                      Gait Training                                 Modalities

## 2025-07-07 ENCOUNTER — APPOINTMENT (OUTPATIENT)
Dept: PHYSICAL THERAPY | Facility: CLINIC | Age: 40
End: 2025-07-07
Payer: COMMERCIAL

## 2025-07-09 ENCOUNTER — APPOINTMENT (OUTPATIENT)
Dept: LAB | Facility: CLINIC | Age: 40
End: 2025-07-09
Attending: FAMILY MEDICINE
Payer: COMMERCIAL

## 2025-07-09 ENCOUNTER — OFFICE VISIT (OUTPATIENT)
Dept: PHYSICAL THERAPY | Facility: CLINIC | Age: 40
End: 2025-07-09
Payer: COMMERCIAL

## 2025-07-09 DIAGNOSIS — M54.2 NECK PAIN: Primary | ICD-10-CM

## 2025-07-09 DIAGNOSIS — G89.29 CHRONIC LEFT SHOULDER PAIN: ICD-10-CM

## 2025-07-09 DIAGNOSIS — G89.29 CHRONIC BILATERAL LOW BACK PAIN, UNSPECIFIED WHETHER SCIATICA PRESENT: ICD-10-CM

## 2025-07-09 DIAGNOSIS — R79.89 ABNORMAL CBC: ICD-10-CM

## 2025-07-09 DIAGNOSIS — M54.6 THORACIC SPINE PAIN: ICD-10-CM

## 2025-07-09 DIAGNOSIS — M54.50 CHRONIC BILATERAL LOW BACK PAIN, UNSPECIFIED WHETHER SCIATICA PRESENT: ICD-10-CM

## 2025-07-09 DIAGNOSIS — G89.29 CHRONIC RIGHT SHOULDER PAIN: ICD-10-CM

## 2025-07-09 DIAGNOSIS — M79.671 HEEL PAIN, BILATERAL: ICD-10-CM

## 2025-07-09 DIAGNOSIS — M79.672 FOOT PAIN, BILATERAL: ICD-10-CM

## 2025-07-09 DIAGNOSIS — M79.671 FOOT PAIN, BILATERAL: ICD-10-CM

## 2025-07-09 DIAGNOSIS — M25.511 CHRONIC RIGHT SHOULDER PAIN: ICD-10-CM

## 2025-07-09 DIAGNOSIS — M79.672 HEEL PAIN, BILATERAL: ICD-10-CM

## 2025-07-09 DIAGNOSIS — M25.512 CHRONIC LEFT SHOULDER PAIN: ICD-10-CM

## 2025-07-09 LAB
BASOPHILS # BLD AUTO: 0.09 THOUSANDS/ÂΜL (ref 0–0.1)
BASOPHILS NFR BLD AUTO: 1 % (ref 0–1)
EOSINOPHIL # BLD AUTO: 0.04 THOUSAND/ÂΜL (ref 0–0.61)
EOSINOPHIL NFR BLD AUTO: 0 % (ref 0–6)
ERYTHROCYTE [DISTWIDTH] IN BLOOD BY AUTOMATED COUNT: 12.4 % (ref 11.6–15.1)
HCT VFR BLD AUTO: 45.4 % (ref 34.8–46.1)
HGB BLD-MCNC: 15.5 G/DL (ref 11.5–15.4)
IMM GRANULOCYTES # BLD AUTO: 0.08 THOUSAND/UL (ref 0–0.2)
IMM GRANULOCYTES NFR BLD AUTO: 1 % (ref 0–2)
LYMPHOCYTES # BLD AUTO: 2.3 THOUSANDS/ÂΜL (ref 0.6–4.47)
LYMPHOCYTES NFR BLD AUTO: 20 % (ref 14–44)
MCH RBC QN AUTO: 32.2 PG (ref 26.8–34.3)
MCHC RBC AUTO-ENTMCNC: 34.1 G/DL (ref 31.4–37.4)
MCV RBC AUTO: 94 FL (ref 82–98)
MONOCYTES # BLD AUTO: 0.62 THOUSAND/ÂΜL (ref 0.17–1.22)
MONOCYTES NFR BLD AUTO: 5 % (ref 4–12)
NEUTROPHILS # BLD AUTO: 8.32 THOUSANDS/ÂΜL (ref 1.85–7.62)
NEUTS SEG NFR BLD AUTO: 73 % (ref 43–75)
NRBC BLD AUTO-RTO: 0 /100 WBCS
PLATELET # BLD AUTO: 210 THOUSANDS/UL (ref 149–390)
PMV BLD AUTO: 11.4 FL (ref 8.9–12.7)
RBC # BLD AUTO: 4.82 MILLION/UL (ref 3.81–5.12)
WBC # BLD AUTO: 11.45 THOUSAND/UL (ref 4.31–10.16)

## 2025-07-09 PROCEDURE — 85025 COMPLETE CBC W/AUTO DIFF WBC: CPT

## 2025-07-09 PROCEDURE — 97110 THERAPEUTIC EXERCISES: CPT | Performed by: PHYSICAL THERAPIST

## 2025-07-09 PROCEDURE — 36415 COLL VENOUS BLD VENIPUNCTURE: CPT

## 2025-07-09 NOTE — PROGRESS NOTES
Daily Note     Today's date: 2025  Patient name: Elis Patterson  : 1985  MRN: [id number]  Referring provider: Korin Caballero MD  Dx:   Encounter Diagnosis     ICD-10-CM    1. Neck pain  M54.2       2. Chronic left shoulder pain  M25.512     G89.29       3. Chronic bilateral low back pain, unspecified whether sciatica present  M54.50     G89.29       4. Heel pain, bilateral  M79.671     M79.672       5. Chronic right shoulder pain  M25.511     G89.29       6. Thoracic spine pain  M54.6       7. Foot pain, bilateral  M79.671     M79.672                        Subjective:  Pt presents today stating that she is doing fairly well.  Neck and LE are feeling comfortable, back is largest challenge today.  Just in back no LE or glute symptoms.        Objective: See treatment diary below      Assessment: Proceeded with outlined activities and enhanced CKC based intervention.  Continue to progress as able.        Plan: Continue per plan of care.        Insurance ReEval POC expires Auth Status Total   Visits  Start date  Expiration date PT/OT + Visit Limit? Co-Insurance Misc   Blue cross 7/6/25 8/15/25 N/A N/A N/A N/A 30 $60 Co-pay                                                        Visit/Unit Tracking  AUTH Status: Not req Date 25            Visits  Authed: Not req Used 1 2 3 4             Remaining     26                   Precautions: none    Manuals                                                    Neuro Re-Ed           PPT           PPT +            PPT+clamshell           PPT+SLR  2x10 ea         Dead bug  LEs only  2x10 ea         SL hip abd           Edu           Ther Ex           Scap retraction X10 B          Bridge x10  HS bridge (Pball under feet) 2x15 HS Bridge 2 x 15       Clamshell edu          SLR X10 ea          Heel raise X20 ea  x20 B (declined second set) 2 x 10       Plantar fascia stretch [id number] ea edu         Calf stretch  Slant board 3j10eob B    Staggered stance HEP edu         LTR  [id number] ea         Knee to chest   50e8dpm ea 10 x 5 (5)        Open book           Prone press up           Standing hip ext    2 x 10       Glute bridge           Leg press  45# x15  60# x15  75# x15  90# x15  100# x15 75# 2x15  90# 2x15  100# x15 90# 2 x 15, 100# 2 x 15       Row   Gabriel 17# 2x15 Gabriel 20# 2 x 15       Edu Re: examination findings, POC, HEP HEP HEP        Ther Activity           Total gym squat    Mini Squat 2 x 10                  Gait Training                                 Modalities

## 2025-07-10 ENCOUNTER — OFFICE VISIT (OUTPATIENT)
Dept: GENETICS | Facility: CLINIC | Age: 40
End: 2025-07-10
Attending: OBSTETRICS & GYNECOLOGY

## 2025-07-10 DIAGNOSIS — Z80.41 FAMILY HISTORY OF OVARIAN CANCER: ICD-10-CM

## 2025-07-10 DIAGNOSIS — Z80.3 FAMILY HISTORY OF BREAST CANCER: ICD-10-CM

## 2025-07-10 PROCEDURE — PBNCHG PB NO CHARGE PLACEHOLDER

## 2025-07-12 ENCOUNTER — APPOINTMENT (OUTPATIENT)
Dept: LAB | Facility: CLINIC | Age: 40
End: 2025-07-12
Payer: COMMERCIAL

## 2025-07-12 DIAGNOSIS — Z80.41 FAMILY HISTORY OF OVARIAN CANCER: ICD-10-CM

## 2025-07-12 DIAGNOSIS — Z80.3 FAMILY HISTORY OF BREAST CANCER: ICD-10-CM

## 2025-07-12 PROCEDURE — 36415 COLL VENOUS BLD VENIPUNCTURE: CPT

## 2025-07-16 ENCOUNTER — TELEPHONE (OUTPATIENT)
Dept: GASTROENTEROLOGY | Facility: CLINIC | Age: 40
End: 2025-07-16

## 2025-07-16 NOTE — TELEPHONE ENCOUNTER
Lmm for pt to call back and r/s appt on 9/5/25 to a different day due to a provider schedule change. Sb

## 2025-07-22 LAB
GENE DIS ANL INTERP-IMP: NORMAL
INTERPRETATION: NORMAL

## 2025-07-29 ENCOUNTER — TELEPHONE (OUTPATIENT)
Dept: PAIN MEDICINE | Facility: CLINIC | Age: 40
End: 2025-07-29

## 2025-07-30 ENCOUNTER — HOSPITAL ENCOUNTER (OUTPATIENT)
Dept: RADIOLOGY | Facility: HOSPITAL | Age: 40
Discharge: HOME | End: 2025-07-30
Attending: PHYSICAL MEDICINE & REHABILITATION
Payer: COMMERCIAL

## 2025-07-30 VITALS
HEART RATE: 77 BPM | TEMPERATURE: 97.2 F | DIASTOLIC BLOOD PRESSURE: 70 MMHG | SYSTOLIC BLOOD PRESSURE: 119 MMHG | OXYGEN SATURATION: 98 % | RESPIRATION RATE: 18 BRPM

## 2025-07-30 DIAGNOSIS — M47.812 CERVICAL SPONDYLOSIS: ICD-10-CM

## 2025-07-30 PROCEDURE — 64490 INJ PARAVERT F JNT C/T 1 LEV: CPT | Performed by: PHYSICAL MEDICINE & REHABILITATION

## 2025-07-30 PROCEDURE — 64491 INJ PARAVERT F JNT C/T 2 LEV: CPT | Performed by: PHYSICAL MEDICINE & REHABILITATION

## 2025-07-30 RX ORDER — BUPIVACAINE HYDROCHLORIDE 7.5 MG/ML
3 INJECTION, SOLUTION EPIDURAL; RETROBULBAR ONCE
Status: COMPLETED | OUTPATIENT
Start: 2025-07-30 | End: 2025-07-30

## 2025-07-30 RX ADMIN — BUPIVACAINE HYDROCHLORIDE 1.5 ML: 7.5 INJECTION, SOLUTION EPIDURAL; RETROBULBAR at 10:04

## 2025-08-01 ENCOUNTER — TELEPHONE (OUTPATIENT)
Dept: PAIN MEDICINE | Facility: CLINIC | Age: 40
End: 2025-08-01

## 2025-08-15 ENCOUNTER — PROCEDURE VISIT (OUTPATIENT)
Dept: OBGYN CLINIC | Facility: CLINIC | Age: 40
End: 2025-08-15

## (undated) DEVICE — DRAPE SHEET THREE QUARTER

## (undated) DEVICE — SPONGE 4 X 4 XRAY 16 PLY STRL LF RFD

## (undated) DEVICE — CYSTO TUBING SINGLE IRRIGATION

## (undated) DEVICE — PREMIUM DRY TRAY LF: Brand: MEDLINE INDUSTRIES, INC.

## (undated) DEVICE — OCCLUDER COLPO-PNEUMO

## (undated) DEVICE — LARGE NEEDLE DRIVER: Brand: ENDOWRIST

## (undated) DEVICE — MARYLAND BIPOLAR FORCEPS: Brand: ENDOWRIST

## (undated) DEVICE — IRRIG ENDO FLO TUBING

## (undated) DEVICE — SYRINGE 5ML LL

## (undated) DEVICE — GLOVE SRG BIOGEL 6

## (undated) DEVICE — TROCAR: Brand: KII FIOS FIRST ENTRY

## (undated) DEVICE — ADHESIVE SKIN HIGH VISCOSITY EXOFIN 1ML

## (undated) DEVICE — SUT STRATAFIX SPIRAL 2-0 CT-1 30 CM SXPP1B410

## (undated) DEVICE — SUT VICRYL 0 UR-6 27 IN J603H

## (undated) DEVICE — EXOFIN PRECISION PEN HIGH VISCOSITY TOPICAL SKIN ADHESIVE: Brand: EXOFIN PRECISION PEN, 1G

## (undated) DEVICE — Device

## (undated) DEVICE — ARM DRAPE

## (undated) DEVICE — LUBRICANT JELLY SURGILUBE TUBE 4OZ FLIP TOP

## (undated) DEVICE — DECANTER: Brand: UNBRANDED

## (undated) DEVICE — SPECIMEN CONTAINER STERILE PEEL PACK

## (undated) DEVICE — TUBING SUCTION 5MM X 12 FT

## (undated) DEVICE — SUT STRATAFIX SMTR PDS PLUS 2-0 SH 18IN SXPP1A409

## (undated) DEVICE — CHLORAPREP HI-LITE 26ML ORANGE

## (undated) DEVICE — UTERINE MANIPULATOR RUMI 5.1 X 6 CM

## (undated) DEVICE — VISUALIZATION SYSTEM: Brand: CLEARIFY

## (undated) DEVICE — CATH URETERAL 5FR X 70 CM FLEX TIP POLYUR BARD

## (undated) DEVICE — 40595 XL TRENDELENBURG POSITIONING KIT: Brand: 40595 XL TRENDELENBURG POSITIONING KIT

## (undated) DEVICE — TIP COVER ACCESSORY

## (undated) DEVICE — SUT VICRYL 0 CT-1 27 IN J260H

## (undated) DEVICE — DRAPE EQUIPMENT RF WAND

## (undated) DEVICE — ASTOUND STANDARD SURGICAL GOWN, XL: Brand: CONVERTORS

## (undated) DEVICE — MONOPOLAR CURVED SCISSORS: Brand: ENDOWRIST

## (undated) DEVICE — GLOVE INDICATOR PI UNDERGLOVE SZ 6.5 BLUE

## (undated) DEVICE — GLOVE SRG BIOGEL 6.5

## (undated) DEVICE — PAD GROUNDING DUAL ADULT

## (undated) DEVICE — NEEDLE 25G X 1 1/2

## (undated) DEVICE — COLUMN DRAPE

## (undated) DEVICE — PROGRASP FORCEPS: Brand: ENDOWRIST

## (undated) DEVICE — SYRINGE 10ML LL

## (undated) DEVICE — PACK TUR

## (undated) DEVICE — CANNULA SEAL

## (undated) DEVICE — AIRSEAL TUBE SMOKE EVAC LUMENX3 FILTERED

## (undated) DEVICE — ELECTRO LUBE IS A SINGLE PATIENT USE DEVICE THAT IS INTENDED TO BE USED ON ELECTROSURGICAL ELECTRODES TO REDUCE STICKING.: Brand: KEY SURGICAL ELECTRO LUBE

## (undated) DEVICE — TRAY FOLEY 16FR URIMETER SILICONE SURESTEP

## (undated) DEVICE — MAYO STAND COVER: Brand: CONVERTORS

## (undated) DEVICE — BLADELESS OBTURATOR: Brand: WECK VISTA

## (undated) DEVICE — SYRINGE 50ML LL

## (undated) DEVICE — HEAVY DUTY TABLE COVER: Brand: CONVERTORS

## (undated) DEVICE — INTENDED FOR TISSUE SEPARATION, AND OTHER PROCEDURES THAT REQUIRE A SHARP SURGICAL BLADE TO PUNCTURE OR CUT.: Brand: BARD-PARKER SAFETY BLADES SIZE 11, STERILE

## (undated) DEVICE — 1820 FOAM BLOCK NEEDLE COUNTER: Brand: DEVON

## (undated) DEVICE — SUT MONOCRYL 4-0 PS-2 27 IN Y426H